# Patient Record
Sex: FEMALE | Race: WHITE | NOT HISPANIC OR LATINO | Employment: OTHER | URBAN - METROPOLITAN AREA
[De-identification: names, ages, dates, MRNs, and addresses within clinical notes are randomized per-mention and may not be internally consistent; named-entity substitution may affect disease eponyms.]

---

## 2017-08-04 ENCOUNTER — HOSPITAL ENCOUNTER (EMERGENCY)
Facility: MEDICAL CENTER | Age: 57
End: 2017-08-04
Attending: EMERGENCY MEDICINE
Payer: MEDICAID

## 2017-08-04 VITALS
OXYGEN SATURATION: 98 % | RESPIRATION RATE: 14 BRPM | BODY MASS INDEX: 34.23 KG/M2 | WEIGHT: 186 LBS | HEIGHT: 62 IN | SYSTOLIC BLOOD PRESSURE: 162 MMHG | TEMPERATURE: 97.9 F | HEART RATE: 93 BPM | DIASTOLIC BLOOD PRESSURE: 144 MMHG

## 2017-08-04 DIAGNOSIS — R60.0 EDEMA OF LEFT LOWER EXTREMITY: ICD-10-CM

## 2017-08-04 DIAGNOSIS — R73.9 HYPERGLYCEMIA: ICD-10-CM

## 2017-08-04 LAB
ALBUMIN SERPL BCP-MCNC: 3.4 G/DL (ref 3.2–4.9)
ALBUMIN/GLOB SERPL: 1.2 G/DL
ALP SERPL-CCNC: 117 U/L (ref 30–99)
ALT SERPL-CCNC: 16 U/L (ref 2–50)
ANION GAP SERPL CALC-SCNC: 9 MMOL/L (ref 0–11.9)
APTT PPP: 42 SEC (ref 24.7–36)
AST SERPL-CCNC: 14 U/L (ref 12–45)
BASOPHILS # BLD AUTO: 0.7 % (ref 0–1.8)
BASOPHILS # BLD: 0.07 K/UL (ref 0–0.12)
BILIRUB SERPL-MCNC: 0.6 MG/DL (ref 0.1–1.5)
BUN SERPL-MCNC: 16 MG/DL (ref 8–22)
CALCIUM SERPL-MCNC: 8.3 MG/DL (ref 8.5–10.5)
CHLORIDE SERPL-SCNC: 102 MMOL/L (ref 96–112)
CK SERPL-CCNC: 46 U/L (ref 0–154)
CO2 SERPL-SCNC: 22 MMOL/L (ref 20–33)
CREAT SERPL-MCNC: 1.19 MG/DL (ref 0.5–1.4)
EOSINOPHIL # BLD AUTO: 0.24 K/UL (ref 0–0.51)
EOSINOPHIL NFR BLD: 2.3 % (ref 0–6.9)
ERYTHROCYTE [DISTWIDTH] IN BLOOD BY AUTOMATED COUNT: 36.4 FL (ref 35.9–50)
GFR SERPL CREATININE-BSD FRML MDRD: 47 ML/MIN/1.73 M 2
GLOBULIN SER CALC-MCNC: 2.8 G/DL (ref 1.9–3.5)
GLUCOSE SERPL-MCNC: 351 MG/DL (ref 65–99)
HCT VFR BLD AUTO: 44.8 % (ref 37–47)
HGB BLD-MCNC: 14 G/DL (ref 12–16)
IMM GRANULOCYTES # BLD AUTO: 0.05 K/UL (ref 0–0.11)
IMM GRANULOCYTES NFR BLD AUTO: 0.5 % (ref 0–0.9)
INR PPP: 2.22 (ref 0.87–1.13)
LYMPHOCYTES # BLD AUTO: 3.5 K/UL (ref 1–4.8)
LYMPHOCYTES NFR BLD: 34 % (ref 22–41)
MCH RBC QN AUTO: 25.2 PG (ref 27–33)
MCHC RBC AUTO-ENTMCNC: 31.3 G/DL (ref 33.6–35)
MCV RBC AUTO: 80.7 FL (ref 81.4–97.8)
MONOCYTES # BLD AUTO: 0.7 K/UL (ref 0–0.85)
MONOCYTES NFR BLD AUTO: 6.8 % (ref 0–13.4)
NEUTROPHILS # BLD AUTO: 5.74 K/UL (ref 2–7.15)
NEUTROPHILS NFR BLD: 55.7 % (ref 44–72)
NRBC # BLD AUTO: 0 K/UL
NRBC BLD AUTO-RTO: 0 /100 WBC
PLATELET # BLD AUTO: 197 K/UL (ref 164–446)
PMV BLD AUTO: 10.4 FL (ref 9–12.9)
POTASSIUM SERPL-SCNC: 3.7 MMOL/L (ref 3.6–5.5)
PROT SERPL-MCNC: 6.2 G/DL (ref 6–8.2)
PROTHROMBIN TIME: 25.3 SEC (ref 12–14.6)
RBC # BLD AUTO: 5.55 M/UL (ref 4.2–5.4)
SODIUM SERPL-SCNC: 133 MMOL/L (ref 135–145)
WBC # BLD AUTO: 10.3 K/UL (ref 4.8–10.8)

## 2017-08-04 PROCEDURE — 93971 EXTREMITY STUDY: CPT

## 2017-08-04 PROCEDURE — 85610 PROTHROMBIN TIME: CPT

## 2017-08-04 PROCEDURE — 85730 THROMBOPLASTIN TIME PARTIAL: CPT

## 2017-08-04 PROCEDURE — 93971 EXTREMITY STUDY: CPT | Mod: 26 | Performed by: SURGERY

## 2017-08-04 PROCEDURE — 85025 COMPLETE CBC W/AUTO DIFF WBC: CPT

## 2017-08-04 PROCEDURE — 82550 ASSAY OF CK (CPK): CPT

## 2017-08-04 PROCEDURE — 80053 COMPREHEN METABOLIC PANEL: CPT

## 2017-08-04 PROCEDURE — 99284 EMERGENCY DEPT VISIT MOD MDM: CPT

## 2017-08-04 RX ORDER — TRAMADOL HYDROCHLORIDE 50 MG/1
50-100 TABLET ORAL EVERY 6 HOURS PRN
Qty: 20 TAB | Refills: 0 | Status: SHIPPED | OUTPATIENT
Start: 2017-08-04 | End: 2017-12-03

## 2017-08-04 NOTE — ED PROVIDER NOTES
ED Provider Note    CHIEF COMPLAINT  Chief Complaint   Patient presents with   • Leg Pain     Pt states about 1 month ago dx with DVT in her left leg. Pain in her left calf is worse and radiating up her thigh. + sensation in left foot. Foot is pink warm and dry.        HPI  Vin Maciel is a 57 y.o. female who presents for evaluation of some crampiness in the left medial as well as lateral thigh mild pain on the calf. The patient reports that she was diagnosed with a DVT at [facility] about 6 weeks ago. She was placed on Eloqius and has been compliant with the medication. She specifically denies pleuritic chest pain shortness of breath hemoptysis. No recent trauma. Her main complaint is pain on the lateral aspect of the thigh rather than the medial thigh she does have some mild symptoms. She is worried about extension of the DVT. She denies any other symptoms such as night sweats weight loss numbness tingling or weakness. She was also worried that her blood sugars running slightly high. She denies any other symptoms    REVIEW OF SYSTEMS  See HPI for further details. No numbness tingling weakness fevers or chills All other systems are negative.     PAST MEDICAL HISTORY  Past Medical History   Diagnosis Date   • DM (diabetes mellitus)    • HTN (hypertension)     DVT    FAMILY HISTORY  No history of bleeding disorder    SOCIAL HISTORY  Social History     Social History   • Marital Status:      Spouse Name: N/A   • Number of Children: N/A   • Years of Education: N/A     Social History Main Topics   • Smoking status: Current Every Day Smoker -- 1.00 packs/day   • Smokeless tobacco: Not on file   • Alcohol Use: No   • Drug Use: No   • Sexual Activity: Not on file     Other Topics Concern   • Not on file     Social History Narrative    denies IV drugs    SURGICAL HISTORY  Past Surgical History   Procedure Laterality Date   • Other       back, neck, shoulder surgeries   • Hysterectomy, total  "abdominal     • Cholecystectomy     • Appendectomy     • [facility]  delivery ser     • Other orthopedic surgery         CURRENT MEDICATIONS  No current facility-administered medications for this encounter.    Current outpatient prescriptions:   •  hydrocodone-acetaminophen (NORCO) 5-325 MG Tab per tablet, Take 1-2 Tabs by mouth every four hours as needed., Disp: 21 Tab, Rfl: 0  •  hydrocodone-acetaminophen (VICODIN ES) 7.5-750 MG per tablet, Take 1 Tab by mouth every four hours as needed (pain)., Disp: 15 Each, Rfl: 0  •  indomethacin (INDOCIN) 50 MG CAPS, Take 1 Cap by mouth 3 times a day, with meals., Disp: 30 Cap, Rfl: 0  •  omeprazole (PRILOSEC) 20 MG CPDR, Take 20 mg by mouth every day., Disp: , Rfl:   •  metoprolol (LOPRESSOR) 50 MG TABS, Take 50 mg by mouth 2 times a day., Disp: , Rfl:   •  cyclobenzaprine (FLEXERIL) 10 MG TABS, Take 10 mg by mouth 3 times a day as needed., Disp: , Rfl:   •  clonazepam (KLONOPIN) 1 MG TABS, Take 1 mg by mouth 3 times a day., Disp: , Rfl:   •  metformin SR (GLUCOPHAGE XR) 500 MG TB24, Take 2,000 mg by mouth every day., Disp: , Rfl:   •  hydrOXYzine (ATARAX) 50 MG TABS, Take 150 mg by mouth every bedtime., Disp: , Rfl:   •  spironolactone (ALDACTONE) 100 MG TABS, Take 50 mg by mouth every day., Disp: , Rfl:   •  glipiZIDE SR (GLUCOTROL) 2.5 MG TB24, Take 2.5 mg by mouth every day., Disp: , Rfl:   •  simvastatin (ZOCOR) 20 MG TABS, Take 20 mg by mouth every day., Disp: , Rfl:   Eloquis    ALLERGIES  Allergies   Allergen Reactions   • Sulfa Drugs Nausea     N/V nervousness       PHYSICAL EXAM  VITAL SIGNS: /144 mmHg  Pulse 103  Temp(Src) 36.6 °C (97.9 °F)  Resp 19  Ht 1.575 m (5' 2\")  Wt 84.369 kg (186 lb)  BMI 34.01 kg/m2  SpO2 97% Room air O2: 97    Constitutional: Well developed, Well nourished, No acute distress, Non-toxic appearance.   HENT: Normocephalic, Atraumatic, Bilateral external ears normal, Oropharynx moist, No oral exudates, Nose normal.   Eyes: " PERRLA, EOMI, Conjunctiva normal, No discharge.   Neck: Normal range of motion, No tenderness, Supple, No stridor.   Cardiovascular: Normal heart rate, Normal rhythm, No murmurs, No rubs, No gallops.   Thorax & Lungs: Normal breath sounds, No respiratory distress, No wheezing, No chest tenderness.   Abdomen: Bowel sounds normal, Soft, No tenderness, No masses, No pulsatile masses.   Skin: Warm, Dry, No erythema, No rash.   Back: No tenderness, No CVA tenderness.   Extremities: Mild tenderness in the left lateral thigh without ecchymosis no significant swelling of the left lower extremity neurovascular exam including compartments sensation and dorsalis pedal pulse in the left leg are normal   Musculoskeletal: Good range of motion in all major joints. No tenderness to palpation or major deformities noted.   Neurologic: Alert & oriented x 3, Normal motor function, Normal sensory function, No focal deficits noted.   Psychiatric: Anxious      RADIOLOGY/PROCEDURES  Results for orders placed or performed during the hospital encounter of 08/04/17   CREATINE KINASE   Result Value Ref Range    CPK Total 46 0 - 154 U/L   CBC WITH DIFFERENTIAL   Result Value Ref Range    WBC 10.3 4.8 - 10.8 K/uL    RBC 5.55 (H) 4.20 - 5.40 M/uL    Hemoglobin 14.0 12.0 - 16.0 g/dL    Hematocrit 44.8 37.0 - 47.0 %    MCV 80.7 (L) 81.4 - 97.8 fL    MCH 25.2 (L) 27.0 - 33.0 pg    MCHC 31.3 (L) 33.6 - 35.0 g/dL    RDW 36.4 35.9 - 50.0 fL    Platelet Count 197 164 - 446 K/uL    MPV 10.4 9.0 - 12.9 fL    Neutrophils-Polys 55.70 44.00 - 72.00 %    Lymphocytes 34.00 22.00 - 41.00 %    Monocytes 6.80 0.00 - 13.40 %    Eosinophils 2.30 0.00 - 6.90 %    Basophils 0.70 0.00 - 1.80 %    Immature Granulocytes 0.50 0.00 - 0.90 %    Nucleated RBC 0.00 /100 WBC    Neutrophils (Absolute) 5.74 2.00 - 7.15 K/uL    Lymphs (Absolute) 3.50 1.00 - 4.80 K/uL    Monos (Absolute) 0.70 0.00 - 0.85 K/uL    Eos (Absolute) 0.24 0.00 - 0.51 K/uL    Baso (Absolute) 0.07 0.00 -  0.12 K/uL    Immature Granulocytes (abs) 0.05 0.00 - 0.11 K/uL    NRBC (Absolute) 0.00 K/uL   COMP METABOLIC PANEL   Result Value Ref Range    Sodium 133 (L) 135 - 145 mmol/L    Potassium 3.7 3.6 - 5.5 mmol/L    Chloride 102 96 - 112 mmol/L    Co2 22 20 - 33 mmol/L    Anion Gap 9.0 0.0 - 11.9    Glucose 351 (H) 65 - 99 mg/dL    Bun 16 8 - 22 mg/dL    Creatinine 1.19 0.50 - 1.40 mg/dL    Calcium 8.3 (L) 8.5 - 10.5 mg/dL    AST(SGOT) 14 12 - 45 U/L    ALT(SGPT) 16 2 - 50 U/L    Alkaline Phosphatase 117 (H) 30 - 99 U/L    Total Bilirubin 0.6 0.1 - 1.5 mg/dL    Albumin 3.4 3.2 - 4.9 g/dL    Total Protein 6.2 6.0 - 8.2 g/dL    Globulin 2.8 1.9 - 3.5 g/dL    A-G Ratio 1.2 g/dL   PROTHROMBIN TIME   Result Value Ref Range    PT 25.3 (H) 12.0 - 14.6 sec    INR 2.22 (H) 0.87 - 1.13   APTT   Result Value Ref Range    APTT 42.0 (H) 24.7 - 36.0 sec   ESTIMATED GFR   Result Value Ref Range    GFR If  56 (A) >60 mL/min/1.73 m 2    GFR If Non  47 (A) >60 mL/min/1.73 m 2        Left lower extremity ultrasound is negative for DVT    COURSE & MEDICAL DECISION MAKING  Pertinent Labs & Imaging studies reviewed. (See chart for details)  Patient here did not have evidence of DVT. Her INR is elevated suggesting that she is indeed anticoagulated. She did not have any significant tachycardia or hypoxia or cardiopulmonary symptoms to suggest pulmonary embolism. Repeat ultrasound here does not demonstrate any worsening or additional DVT. I suspect she might have some chronic leg pain and swelling from post embolic syndrome which is quite common and lower extremity DVTs with that being said I did not feel that any change in her medication regimen is indicated other than considering increasing her glipizide to 5 mg due to hyperglycemia    FINAL IMPRESSION  1. Left lower extremity pain unclear etiology possible postphlebitic syndrome  2. Hyperglycemia without acidosis      Electronically signed by: Adrian Tavares,  8/4/2017 3:23 PM

## 2017-08-04 NOTE — ED NOTES
"Chief Complaint   Patient presents with   • Leg Pain     Pt states about 1 month ago dx with DVT in her left leg. Pain in her left calf is worse and radiating up her thigh. + sensation in left foot. Foot is pink warm and dry.      Pt denies any SOB of CP.   /144 mmHg  Pulse 103  Temp(Src) 36.6 °C (97.9 °F)  Resp 19  Ht 1.575 m (5' 2\")  Wt 84.369 kg (186 lb)  BMI 34.01 kg/m2  SpO2 97%  In gown, on monitor, chart up for ERP.   "

## 2017-08-04 NOTE — ED AVS SNAPSHOT
Home Care Instructions                                                                                                                Vin Maciel   MRN: [id number]    Department:  [facility], Emergency Dept   Date of Visit:  8/4/2017            [facility], Emergency Dept    [address]    [place] [address]    Phone:  [phone]      You were seen by     Adrian Tavares M.D.      Your Diagnosis Was     Edema of left lower extremity     R60.0       Follow-up Information     1. Follow up with Pcp Not In Computer.    Specialty:  Family Medicine    Why:  follow-up with her regular doctor on Monday or Tuesday. YOu can increase your Lantus to 90 units instead of 80 units      Medication Information     Review all of your home medications and newly ordered medications with your primary doctor and/or pharmacist as soon as possible. Follow medication instructions as directed by your doctor and/or pharmacist.     Please keep your complete medication list with you and share with your physician. Update the information when medications are discontinued, doses are changed, or new medications (including over-the-counter products) are added; and carry medication information at all times in the event of emergency situations.               Medication List      START taking these medications        Instructions    Morning Afternoon Evening Bedtime    tramadol 50 MG Tabs   Commonly known as:  ULTRAM        Take 1-2 Tabs by mouth every 6 hours as needed for Moderate Pain.   Dose:   mg                          ASK your doctor about these medications        Instructions    Morning Afternoon Evening Bedtime    cyclobenzaprine 10 MG Tabs   Commonly known as:  FLEXERIL        Take 10 mg by mouth 3 times a day as needed.   Dose:  10 mg                        glipiZIDE SR 2.5 MG Tb24   Commonly known as:  GLUCOTROL        Take 2.5 mg by mouth every day.   Dose:  2.5 mg                        * hydrocodone-acetaminophen 7.5-750 MG per tablet   Commonly known as:  VICODIN ES        Take 1 Tab by mouth every four hours as needed (pain).   Dose:  1 Tab                        * hydrocodone-acetaminophen 5-325 MG Tabs per tablet   Commonly known as:  NORCO        Take 1-2 Tabs by mouth every four hours as needed.   Dose:  1-2 Tab                        hydrOXYzine 50 MG Tabs   Commonly known as:  ATARAX        Take 150 mg by mouth every bedtime.   Dose:  150 mg                        indomethacin 50 MG Caps   Commonly known as:  INDOCIN        Take 1 Cap by mouth 3 times a day, with meals.   Dose:  50 mg                        KLONOPIN 1 MG Tabs   Generic drug:  clonazepam        Take 1 mg by mouth 3 times a day.   Dose:  1 mg                        metformin  MG Tb24   Commonly known as:  GLUCOPHAGE XR        Take 2,000 mg by mouth every day.   Dose:  2000 mg                        metoprolol 50 MG Tabs   Commonly known as:  LOPRESSOR        Take 50 mg by mouth 2 times a day.   Dose:  50 mg                        omeprazole 20 MG delayed-release capsule   Commonly known as:  PRILOSEC        Take 20 mg by mouth every day.   Dose:  20 mg                        simvastatin 20 MG Tabs   Commonly known as:  ZOCOR        Take 20 mg by mouth every day.   Dose:  20 mg                        spironolactone 100 MG Tabs   Commonly known as:  ALDACTONE        Take 50 mg by mouth every day.   Dose:  50 mg                        * Notice:  This list has 2 medication(s) that are the same as other medications prescribed for you. Read the directions carefully, and ask your doctor or other care provider to review them with you.         Where to Get Your Medications      You can get these medications from any pharmacy     Bring a paper prescription for each of these medications    - tramadol 50 MG Tabs            Procedures and tests performed during your visit     APTT    CBC WITH DIFFERENTIAL    COMP METABOLIC PANEL     CREATINE KINASE    ESTIMATED GFR    LE VENOUS DUPLEX (Specify in Comments Left, Right Or Bilateral)    PROTHROMBIN TIME        Discharge Instructions       Diabetes, Type 2, Am I At Risk?  Diabetes is a lasting (chronic) disease. In type 2 diabetes, the pancreas does not make enough insulin, and the body does not respond normally to the insulin that is made. This type of diabetes was also previously called adult onset diabetes. About 90% of all those who have diabetes have type 2. It usually occurs after the age of 40, but can occur at any age.   People develop type 2 diabetes because they do not use insulin properly. Eventually, the pancreas cannot make enough insulin for the body's needs. Over time, the amount of glucose (sugar) in the blood increases.  RISK FACTORS  · Overweight  the more weight you have, the more resistant your cells become to insulin.  · Family history  you are more likely to get diabetes if a parent or sibling has diabetes.  · Race certain races get diabetes more.  ·  Americans.  · American Indians.  ·  Americans.  · Hispanics.  · .  · Inactive exercise helps control weight and helps your cells be more sensitive to insulin.  · Gestational diabetes  some women develop diabetes while they are pregnant. This goes away when they deliver. However, they are 50-60% more likely to develop type 2 diabetes at a later time.  · Having a baby over 9 pounds  a sign that you may have had gestational diabetes.  · Age the risk of diabetes goes up as you get older, especially after age 45.  · High blood pressure (hypertension).  SYMPTOMS  Many people have no signs or symptoms. Symptoms can be so mild that you might not even notice them. Some of these signs are:  · Increased thirst.  · Increased hunger.  · Tiredness (fatigue).  · Increased urination, especially at night.  · Weight loss.  · Blurred vision.  · Sores that do not heal.  WHO SHOULD BE TESTED?  · Anyone 45 years or older,  especially if overweight, should consider getting tested.  · If you are younger than 45, overweight, and have one or more of the risk factors, you should consider getting tested.  DIAGNOSIS  · Fasting blood glucose (FBS). Usually, 2 are done.  · -125 mg/dl is considered pre-diabetes.  ·  mg/dl or greater is considered diabetes.  · 2 hour Oral Glucose Tolerance Test (OGTT). This test is preformed by first having you not eat or drink for several hours. You are then given something sweet to drink and your blood glucose is measured fasting, at one hour and 2 hours. This test tells how well you are able to handle sugars or carbohydrates.  · Fastin-100 mg/dl.  · 1 hour: less than 200 mg/dl.  · 2 hours: less than 140 mg/dl.  · A1c A1c is a blood glucose test that gives and average of your blood glucose over 3 months. It is the accepted method to use to diagnose diabetes.  · A1c 5.7-6.4% is considered pre-diabetes.  · A1c 6.5% or greater is considered diabetes.  WHAT DOES IT MEAN TO HAVE PRE-DIABETES?  Pre-diabetes means you are at risk for getting type 2 diabetes. Your blood glucose is higher than normal, but not yet high enough to diagnose diabetes. The good news is, if you have pre-diabetes you can reduce the risk of getting diabetes and even return to normal blood glucose levels. With modest weight loss and moderate physical activity, you can delay or prevent type 2 diabetes.   PREVENTION  You cannot do anything about race, age or family history, but you can lower your chances of getting diabetes. You can:   · Exercise regularly and be active.  · Reduce fat and calorie intake.  · Make wise food choices as much as you can.  · Reduce your intake of salt and alcohol.  · Maintain a reasonable weight.  · Keep blood pressure in an acceptable range. Take medication if needed.  · Not smoke.  · Maintain an acceptable cholesterol level (HDL, LDL, Triglycerides). Take medication if needed.  DOING MY PART:  GETTING STARTED  Making big changes in your life is hard, especially if you are faced with more than one change. You can make it easier by taking these steps:  · Make a plan to change behavior.  · Decide exactly what you will do and when you will do it.  · Plan what you need to get ready.  · Think about what might prevent you from reaching your goals.  · Find family and friends who will support and encourage you.  · Decide how you will reward yourself when you do what you have planned.  · Your doctor, dietitian, or counselor can help you make a plan.  HERE ARE SOME OF THE AREAS YOU MAY WISH TO CHANGE TO REDUCE YOUR RISK OF DIABETES.  If you are overweight or obese, choose sensible ways to get in shape. Even small amounts of weight loss, like 5-10 pounds, can help reduce the effects of insulin resistance and help blood glucose control.  Diet  · Avoid crash diets. Instead, eat less of the foods you usually have. Limit the amount of fat you eat.  · Increase your physical activity. Aim for at least 30 minutes of exercise most days of the week.  · Set a reasonable weight-loss goal, such as losing 1 pound a week. Aim for a long-term goal of losing 5-7% of your total body weight.  · Make wise food choices most of the time.  · What you eat has a big impact on your health. By making mathew food choices, you can help control your body weight, blood pressure, and cholesterol.  · Take a hard look at the serving sizes of the foods you eat. Reduce serving sizes of meat, desserts, and foods high in fat. Increase your intake of fruits and vegetables.  · Limit your fat intake to about 25% of your total calories. For example, if your food choices add up to about 2,000 calories a day, try to eat no more than 56 grams of fat. Your caregiver or a dietitian can help you figure out how much fat to have. You can check food labels for fat content too.  · You may also want to reduce the number of calories you have each day.  · Keep a food log.  Write down what you eat, how much you eat, and anything else that helps keep you on track.  · When you meet your goal, reward yourself with a nonfood item or activity.  Exercise  · Be physically active every day.  · Keep and exercise log. Write down what exercise you did, for how long, and anything else that keeps you on track.  · Regular exercise (like brisk walking) tackles several risk factors at once. It helps you lose weight, it keeps your cholesterol and blood pressure under control, and it helps your body use insulin. People who are physically active for 30 minutes a day, 5 days a week, reduced their risk of type 2 diabetes. If you are not very active, you should start slowly at first. Talk with your caregiver first about what kinds of exercise would be safe for you. Make a plan to increase your activity level with the goal of being active for at least 30 minutes a day, most days of the week.  · Choose activities you enjoy. Here are some ways to work extra activity into your daily routine:  · Take the stairs rather than an elevator or escalator.  · Park at the far end of the lot and walk.  · Get off the bus a few stops early and walk the rest of the way.  · Walk or bicycle instead of drive whenever you can.  Medications  Some people need medication to help control their blood pressure or cholesterol levels. If you do, take your medicines as directed. Ask your caregiver whether there are any medicines you can take to prevent type 2 diabetes.  Document Released: 12/20/2004 Document Revised: 03/11/2013 Document Reviewed: 09/15/2010  ExitCare® Patient Information ©2014 [employer].    Edema  Edema is an abnormal buildup of fluids in your body tissues. Edema is somewhat dependent on gravity to pull the fluid to the lowest place in your body. That makes the condition more common in the legs and thighs (lower extremities). Painless swelling of the feet and ankles is common and becomes more likely as you get older.  It is also common in looser tissues, like around your eyes.   When the affected area is squeezed, the fluid may move out of that spot and leave a dent for a few moments. This dent is called pitting.   CAUSES   There are many possible causes of edema. Eating too much salt and being on your feet or sitting for a long time can cause edema in your legs and ankles. Hot weather may make edema worse. Common medical causes of edema include:  · Heart failure.  · Liver disease.  · Kidney disease.  · Weak blood vessels in your legs.  · Cancer.  · An injury.  · Pregnancy.  · Some medications.  · Obesity.   SYMPTOMS   Edema is usually painless. Your skin may look swollen or shiny.   DIAGNOSIS   Your health care provider may be able to diagnose edema by asking about your medical history and doing a physical exam. You may need to have tests such as X-rays, an electrocardiogram, or blood tests to check for medical conditions that may cause edema.   TREATMENT   Edema treatment depends on the cause. If you have heart, liver, or kidney disease, you need the treatment appropriate for these conditions. General treatment may include:  · Elevation of the affected body part above the level of your heart.  · Compression of the affected body part. Pressure from elastic bandages or support stockings squeezes the tissues and forces fluid back into the blood vessels. This keeps fluid from entering the tissues.  · Restriction of fluid and salt intake.  · Use of a water pill (diuretic). These medications are appropriate only for some types of edema. They pull fluid out of your body and make you urinate more often. This gets rid of fluid and reduces swelling, but diuretics can have side effects. Only use diuretics as directed by your health care provider.  HOME CARE INSTRUCTIONS   · Keep the affected body part above the level of your heart when you are lying down.    · Do not sit still or stand for prolonged periods.    · Do not put anything  directly under your knees when lying down.  · Do not wear constricting clothing or garters on your upper legs.    · Exercise your legs to work the fluid back into your blood vessels. This may help the swelling go down.    · Wear elastic bandages or support stockings to reduce ankle swelling as directed by your health care provider.    · Eat a low-salt diet to reduce fluid if your health care provider recommends it.    · Only take medicines as directed by your health care provider.   SEEK MEDICAL CARE IF:   · Your edema is not responding to treatment.  · You have heart, liver, or kidney disease and notice symptoms of edema.  · You have edema in your legs that does not improve after elevating them.    · You have sudden and unexplained weight gain.  SEEK IMMEDIATE MEDICAL CARE IF:   · You develop shortness of breath or chest pain.    · You cannot breathe when you lie down.  · You develop pain, redness, or warmth in the swollen areas.    · You have heart, liver, or kidney disease and suddenly get edema.  · You have a fever and your symptoms suddenly get worse.  MAKE SURE YOU:   · Understand these instructions.  · Will watch your condition.  · Will get help right away if you are not doing well or get worse.     This information is not intended to replace advice given to you by your health care provider. Make sure you discuss any questions you have with your health care provider.     Document Released: 12/18/2006 Document Revised: 01/08/2016 Document Reviewed: 10/10/2014  Elsevier Interactive Patient Education ©2016 [employer]            Patient Information     Patient Information    Following emergency treatment: all patient requiring follow-up care must return either to a private physician or a clinic if your condition worsens before you are able to obtain further medical attention, please return to the emergency room.     Billing Information    At [facility], we work to make the billing process streamlined for  our patients.  Our Representatives are here to answer any questions you may have regarding your hospital bill.  If you have insurance coverage and have supplied your insurance information to us, we will submit a claim to your insurer on your behalf.  Should you have any questions regarding your bill, we can be reached online or by phone as follows:  Online: You are able pay your bills online or live chat with our representatives about any billing questions you may have. We are here to help Monday - Friday from 8:00am to 7:30pm and 9:00am - 12:00pm on Saturdays.  Please visit [email or web address]  for more information.   Phone:  [phone] or [phone]    Please note that your emergency physician, surgeon, pathologist, radiologist, anesthesiologist, and other specialists are not employed by [facility] and will therefore bill separately for their services.  Please contact them directly for any questions concerning their bills at the numbers below:     Emergency Physician Services:  [phone]  [facility]:  [phone]  [facility]:  [phone]  [facility]:  [phone]    1. Your final bill may vary from the amount quoted upon discharge if all procedures are not complete at that time, or if your doctor has additional procedures of which we are not aware. You will receive an additional bill if you return to the Emergency Department at [facility] for suture removal regardless of the facility of which the sutures were placed.     2. Please arrange for settlement of this account at the emergency registration.    3. All self-pay accounts are due in full at the time of treatment.  If you are unable to meet this obligation then payment is expected within 4-5 days.     4. If you have had radiology studies (CT, X-ray, Ultrasound, MRI), you have received a preliminary result during your emergency department visit. Please contact the  radiology department [phone] to receive a copy of your final result. Please discuss the Final result with your primary physician or with the follow up physician provided.     Crisis Hotline:  [place] Crisis Hotline:  2-752-OFIZDOC or 1-390.647.5829  [place] Crisis Hotline:    1-790.428.4771 or 358-852-9177         ED Discharge Follow Up Questions    1. In order to provide you with very good care, we would like to follow up with a phone call in the next few days.  May we have your permission to contact you?     YES /  NO    2. What is the best phone number to call you? (       )_____-__________    3. What is the best time to call you?      Morning  /  Afternoon  /  Evening                   Patient Signature:  ____________________________________________________________    Date:  ____________________________________________________________      Your appointments     Aug 10, 2017  2:45 PM   New Patient with Agustín Randolph M.D.   [facility] - Internal Medicine (--)    [address] [place] [id number]   [phone]           Please bring Photo ID, Insurance Cards, All Medication Bottles and copies of any legal documents (such as Living Will, Power of ) If speaking a language besides English please bring an adult . Please arrive 30 minutes prior for check in and registration. You will be receiving a confirmation call a few days before your appointment from our automated call confirmation system.

## 2017-08-04 NOTE — ED AVS SNAPSHOT
8/4/2017    Vin Maciel  [address] [facility] [address]    Dear Vin:    [facility] wants to ensure your discharge home is safe and you or your loved ones have had all of your questions answered regarding your care after you leave the hospital.    Below is a list of resources and contact information should you have any questions regarding your hospital stay, follow-up instructions, or active medical symptoms.    Questions or Concerns Regarding… Contact   Medical Questions Related to Your Discharge  (7 days a week, 8am-5pm) Contact a Nurse Care Coordinator   [phone]   Medical Questions Not Related to Your Discharge  (24 hours a day / 7 days a week)  Contact the Nurse Health Line   [phone]    Medications or Discharge Instructions Refer to your discharge packet   or contact your [facility] Primary Care Provider   [phone]   Follow-up Appointment(s) Schedule your appointment via [employer]   or contact Scheduling [phone]   Billing Review your statement via [employer]  or contact Billing [phone]   Medical Records Review your records via [employer]   or contact Medical Records [phone]     You may receive a telephone call within two days of discharge. This call is to make certain you understand your discharge instructions and have the opportunity to have any questions answered. You can also easily access your medical information, test results and upcoming appointments via the [employer] free online health management tool. You can learn more and sign up at [employer]. For assistance setting up your [employer] account, please call [phone].    Once again, we want to ensure your discharge home is safe and that you have a clear understanding of any next steps in your care. If you have any questions or concerns, please do not hesitate to contact us, we are here for you. Thank you for choosing [facility] for your healthcare needs.    Sincerely,    Your [facility] Healthcare Team

## 2017-08-04 NOTE — DISCHARGE INSTRUCTIONS
Diabetes, Type 2, Am I At Risk?  Diabetes is a lasting (chronic) disease. In type 2 diabetes, the pancreas does not make enough insulin, and the body does not respond normally to the insulin that is made. This type of diabetes was also previously called adult onset diabetes. About 90% of all those who have diabetes have type 2. It usually occurs after the age of 40, but can occur at any age.   People develop type 2 diabetes because they do not use insulin properly. Eventually, the pancreas cannot make enough insulin for the body's needs. Over time, the amount of glucose (sugar) in the blood increases.  RISK FACTORS  · Overweight  the more weight you have, the more resistant your cells become to insulin.  · Family history  you are more likely to get diabetes if a parent or sibling has diabetes.  · Race certain races get diabetes more.  ·  Americans.  · American Indians.  ·  Americans.  · Hispanics.  · .  · Inactive exercise helps control weight and helps your cells be more sensitive to insulin.  · Gestational diabetes  some women develop diabetes while they are pregnant. This goes away when they deliver. However, they are 50-60% more likely to develop type 2 diabetes at a later time.  · Having a baby over 9 pounds  a sign that you may have had gestational diabetes.  · Age the risk of diabetes goes up as you get older, especially after age 45.  · High blood pressure (hypertension).  SYMPTOMS  Many people have no signs or symptoms. Symptoms can be so mild that you might not even notice them. Some of these signs are:  · Increased thirst.  · Increased hunger.  · Tiredness (fatigue).  · Increased urination, especially at night.  · Weight loss.  · Blurred vision.  · Sores that do not heal.  WHO SHOULD BE TESTED?  · Anyone 45 years or older, especially if overweight, should consider getting tested.  · If you are younger than 45, overweight, and have one or more of the risk factors, you should  consider getting tested.  DIAGNOSIS  · Fasting blood glucose (FBS). Usually, 2 are done.  · -125 mg/dl is considered pre-diabetes.  ·  mg/dl or greater is considered diabetes.  · 2 hour Oral Glucose Tolerance Test (OGTT). This test is preformed by first having you not eat or drink for several hours. You are then given something sweet to drink and your blood glucose is measured fasting, at one hour and 2 hours. This test tells how well you are able to handle sugars or carbohydrates.  · Fastin-100 mg/dl.  · 1 hour: less than 200 mg/dl.  · 2 hours: less than 140 mg/dl.  · A1c A1c is a blood glucose test that gives and average of your blood glucose over 3 months. It is the accepted method to use to diagnose diabetes.  · A1c 5.7-6.4% is considered pre-diabetes.  · A1c 6.5% or greater is considered diabetes.  WHAT DOES IT MEAN TO HAVE PRE-DIABETES?  Pre-diabetes means you are at risk for getting type 2 diabetes. Your blood glucose is higher than normal, but not yet high enough to diagnose diabetes. The good news is, if you have pre-diabetes you can reduce the risk of getting diabetes and even return to normal blood glucose levels. With modest weight loss and moderate physical activity, you can delay or prevent type 2 diabetes.   PREVENTION  You cannot do anything about race, age or family history, but you can lower your chances of getting diabetes. You can:   · Exercise regularly and be active.  · Reduce fat and calorie intake.  · Make wise food choices as much as you can.  · Reduce your intake of salt and alcohol.  · Maintain a reasonable weight.  · Keep blood pressure in an acceptable range. Take medication if needed.  · Not smoke.  · Maintain an acceptable cholesterol level (HDL, LDL, Triglycerides). Take medication if needed.  DOING MY PART: GETTING STARTED  Making big changes in your life is hard, especially if you are faced with more than one change. You can make it easier by taking these  steps:  · Make a plan to change behavior.  · Decide exactly what you will do and when you will do it.  · Plan what you need to get ready.  · Think about what might prevent you from reaching your goals.  · Find family and friends who will support and encourage you.  · Decide how you will reward yourself when you do what you have planned.  · Your doctor, dietitian, or counselor can help you make a plan.  HERE ARE SOME OF THE AREAS YOU MAY WISH TO CHANGE TO REDUCE YOUR RISK OF DIABETES.  If you are overweight or obese, choose sensible ways to get in shape. Even small amounts of weight loss, like 5-10 pounds, can help reduce the effects of insulin resistance and help blood glucose control.  Diet  · Avoid crash diets. Instead, eat less of the foods you usually have. Limit the amount of fat you eat.  · Increase your physical activity. Aim for at least 30 minutes of exercise most days of the week.  · Set a reasonable weight-loss goal, such as losing 1 pound a week. Aim for a long-term goal of losing 5-7% of your total body weight.  · Make wise food choices most of the time.  · What you eat has a big impact on your health. By making mathew food choices, you can help control your body weight, blood pressure, and cholesterol.  · Take a hard look at the serving sizes of the foods you eat. Reduce serving sizes of meat, desserts, and foods high in fat. Increase your intake of fruits and vegetables.  · Limit your fat intake to about 25% of your total calories. For example, if your food choices add up to about 2,000 calories a day, try to eat no more than 56 grams of fat. Your caregiver or a dietitian can help you figure out how much fat to have. You can check food labels for fat content too.  · You may also want to reduce the number of calories you have each day.  · Keep a food log. Write down what you eat, how much you eat, and anything else that helps keep you on track.  · When you meet your goal, reward yourself with a nonfood  item or activity.  Exercise  · Be physically active every day.  · Keep and exercise log. Write down what exercise you did, for how long, and anything else that keeps you on track.  · Regular exercise (like brisk walking) tackles several risk factors at once. It helps you lose weight, it keeps your cholesterol and blood pressure under control, and it helps your body use insulin. People who are physically active for 30 minutes a day, 5 days a week, reduced their risk of type 2 diabetes. If you are not very active, you should start slowly at first. Talk with your caregiver first about what kinds of exercise would be safe for you. Make a plan to increase your activity level with the goal of being active for at least 30 minutes a day, most days of the week.  · Choose activities you enjoy. Here are some ways to work extra activity into your daily routine:  · Take the stairs rather than an elevator or escalator.  · Park at the far end of the lot and walk.  · Get off the bus a few stops early and walk the rest of the way.  · Walk or bicycle instead of drive whenever you can.  Medications  Some people need medication to help control their blood pressure or cholesterol levels. If you do, take your medicines as directed. Ask your caregiver whether there are any medicines you can take to prevent type 2 diabetes.  Document Released: 12/20/2004 Document Revised: 03/11/2013 Document Reviewed: 09/15/2010  ExitCare® Patient Information ©2014 [employer], [address].    Edema  Edema is an abnormal buildup of fluids in your body tissues. Edema is somewhat dependent on gravity to pull the fluid to the lowest place in your body. That makes the condition more common in the legs and thighs (lower extremities). Painless swelling of the feet and ankles is common and becomes more likely as you get older. It is also common in looser tissues, like around your eyes.   When the affected area is squeezed, the fluid may move out of that spot and leave a  dent for a few moments. This dent is called pitting.   CAUSES   There are many possible causes of edema. Eating too much salt and being on your feet or sitting for a long time can cause edema in your legs and ankles. Hot weather may make edema worse. Common medical causes of edema include:  · Heart failure.  · Liver disease.  · Kidney disease.  · Weak blood vessels in your legs.  · Cancer.  · An injury.  · Pregnancy.  · Some medications.  · Obesity.   SYMPTOMS   Edema is usually painless. Your skin may look swollen or shiny.   DIAGNOSIS   Your health care provider may be able to diagnose edema by asking about your medical history and doing a physical exam. You may need to have tests such as X-rays, an electrocardiogram, or blood tests to check for medical conditions that may cause edema.   TREATMENT   Edema treatment depends on the cause. If you have heart, liver, or kidney disease, you need the treatment appropriate for these conditions. General treatment may include:  · Elevation of the affected body part above the level of your heart.  · Compression of the affected body part. Pressure from elastic bandages or support stockings squeezes the tissues and forces fluid back into the blood vessels. This keeps fluid from entering the tissues.  · Restriction of fluid and salt intake.  · Use of a water pill (diuretic). These medications are appropriate only for some types of edema. They pull fluid out of your body and make you urinate more often. This gets rid of fluid and reduces swelling, but diuretics can have side effects. Only use diuretics as directed by your health care provider.  HOME CARE INSTRUCTIONS   · Keep the affected body part above the level of your heart when you are lying down.    · Do not sit still or stand for prolonged periods.    · Do not put anything directly under your knees when lying down.  · Do not wear constricting clothing or garters on your upper legs.    · Exercise your legs to work the  fluid back into your blood vessels. This may help the swelling go down.    · Wear elastic bandages or support stockings to reduce ankle swelling as directed by your health care provider.    · Eat a low-salt diet to reduce fluid if your health care provider recommends it.    · Only take medicines as directed by your health care provider.   SEEK MEDICAL CARE IF:   · Your edema is not responding to treatment.  · You have heart, liver, or kidney disease and notice symptoms of edema.  · You have edema in your legs that does not improve after elevating them.    · You have sudden and unexplained weight gain.  SEEK IMMEDIATE MEDICAL CARE IF:   · You develop shortness of breath or chest pain.    · You cannot breathe when you lie down.  · You develop pain, redness, or warmth in the swollen areas.    · You have heart, liver, or kidney disease and suddenly get edema.  · You have a fever and your symptoms suddenly get worse.  MAKE SURE YOU:   · Understand these instructions.  · Will watch your condition.  · Will get help right away if you are not doing well or get worse.     This information is not intended to replace advice given to you by your health care provider. Make sure you discuss any questions you have with your health care provider.     Document Released: 12/18/2006 Document Revised: 01/08/2016 Document Reviewed: 10/10/2014  [employer] Interactive Patient Education ©2016 [employer]

## 2017-08-04 NOTE — ED AVS SNAPSHOT
[employer] ([employer]) Access Code: [email or web address]  Expires: 8/30/2017  4:56 PM    Ext[employer] Reach (formerly [employer])  A secure, online tool to manage your health information     [employer]s Ext[employer] Reach (formerly Brand[employer])® is a secure, online tool that connects you to your personalized health information from the privacy of your home -- day or night - making it very easy for you to manage your healthcare. Once the activation process is completed, you can even access your medical information using the Extreme Reach (formerly [employer]) babita, which is available for free in the Apple Babita store or Google Play store.     Ext[employer]ach (formerly [employer]) provides the following levels of access (as shown below):   My Chart Features   [facility] Primary Care Doctor [facility]  Specialists [facility]  Urgent  Care Non-[facility]  Primary Care  Doctor   Email your healthcare team securely and privately 24/7 X X X X   Manage appointments: schedule your next appointment; view details of past/upcoming appointments X      Request prescription refills. X      View recent personal medical records, including lab and immunizations X X X X   View health record, including health history, allergies, medications X X X X   Read reports about your outpatient visits, procedures, consult and ER notes X X X X   See your discharge summary, which is a recap of your hospital and/or ER visit that includes your diagnosis, lab results, and care plan. X X       How to register for Ext[employer]ach (formerly [employer]):  1. Go to  [email or web address].  2. Click on the Sign Up Now box, which takes you to the New Member Sign Up page. You will need to provide the following information:  a. Enter your Ext[employer] Reach (formerly [employer]) Access Code exactly as it appears at the top of this page. (You will not need to use this code after you’ve completed the sign-up process. If you do not sign up before the expiration date, you must request a new code.)   b. Enter your date of birth.   c. Enter your home email address.   d. Click Submit, and follow the next screen’s instructions.  3. Create a Ext[employer] Reach (formerly [employer]) ID. This will be your Ext[employer]ach (formerly [employer])  login ID and cannot be changed, so think of one that is secure and easy to remember.  4. Create a [employer] password. You can change your password at any time.  5. Enter your Password Reset Question and Answer. This can be used at a later time if you forget your password.   6. Enter your e-mail address. This allows you to receive e-mail notifications when new information is available in [employer].  7. Click Sign Up. You can now view your health information.    For assistance activating your [employer] account, call [phone]

## 2017-08-05 NOTE — ED NOTES
Verbalizes understanding of discharge and followup instructions. PIV removed, VSS. Given Rx. Ambulates with steady gait to discharge where she plans to take taxi home.

## 2017-10-08 ENCOUNTER — APPOINTMENT (OUTPATIENT)
Dept: RADIOLOGY | Facility: MEDICAL CENTER | Age: 57
End: 2017-10-08
Attending: EMERGENCY MEDICINE
Payer: MEDICARE

## 2017-10-08 ENCOUNTER — HOSPITAL ENCOUNTER (EMERGENCY)
Facility: MEDICAL CENTER | Age: 57
End: 2017-10-08
Attending: EMERGENCY MEDICINE
Payer: MEDICARE

## 2017-10-08 VITALS
OXYGEN SATURATION: 98 % | HEIGHT: 62 IN | SYSTOLIC BLOOD PRESSURE: 117 MMHG | TEMPERATURE: 98 F | DIASTOLIC BLOOD PRESSURE: 80 MMHG | RESPIRATION RATE: 18 BRPM | BODY MASS INDEX: 33.02 KG/M2 | WEIGHT: 179.45 LBS | HEART RATE: 111 BPM

## 2017-10-08 DIAGNOSIS — L03.119 CELLULITIS OF FOOT: ICD-10-CM

## 2017-10-08 DIAGNOSIS — R73.9 HYPERGLYCEMIA: ICD-10-CM

## 2017-10-08 LAB — GLUCOSE BLD-MCNC: 351 MG/DL (ref 65–99)

## 2017-10-08 PROCEDURE — 73630 X-RAY EXAM OF FOOT: CPT | Mod: RT

## 2017-10-08 PROCEDURE — 700102 HCHG RX REV CODE 250 W/ 637 OVERRIDE(OP): Performed by: EMERGENCY MEDICINE

## 2017-10-08 PROCEDURE — 82962 GLUCOSE BLOOD TEST: CPT

## 2017-10-08 PROCEDURE — 99284 EMERGENCY DEPT VISIT MOD MDM: CPT

## 2017-10-08 PROCEDURE — A9270 NON-COVERED ITEM OR SERVICE: HCPCS | Performed by: EMERGENCY MEDICINE

## 2017-10-08 RX ORDER — CEPHALEXIN 500 MG/1
1000 CAPSULE ORAL 3 TIMES DAILY
Qty: 42 CAP | Refills: 0 | Status: SHIPPED | OUTPATIENT
Start: 2017-10-08 | End: 2017-10-11

## 2017-10-08 RX ORDER — ACETAMINOPHEN 325 MG/1
1000 TABLET ORAL ONCE
Status: COMPLETED | OUTPATIENT
Start: 2017-10-08 | End: 2017-10-08

## 2017-10-08 RX ADMIN — ACETAMINOPHEN 975 MG: 325 TABLET, FILM COATED ORAL at 20:06

## 2017-10-08 ASSESSMENT — LIFESTYLE VARIABLES: DO YOU DRINK ALCOHOL: NO

## 2017-10-08 ASSESSMENT — PAIN SCALES - GENERAL: PAINLEVEL_OUTOF10: 8

## 2017-10-08 NOTE — ED NOTES
Pt to triage c/o left heel wound x 4 days. Area red and tender. Pt advised to return to triage nurse for any changes or concerns.

## 2017-10-09 NOTE — ED PROVIDER NOTES
ED Provider Note    Scribed for Tory Gibson M.D. by Nasrin Edmondson. 10/8/2017  7:49 PM    Means of arrival: Walk-in  History obtained from: Patient  History limited by: None    CHIEF COMPLAINT  Heel pain    HPI  Vin Maciel is a 57 y.o. female with a history of diabetes, hypertension and DVT who presents to the Emergency Department for right heel pain.  States that is warm to touch onset five days that she reports comes on every five minutes. She states it feels like a knife is being shoved into her heel, and she is unable to sleep at night due to the pain. She denies any recent trauma or falls prior to the pain onset. She has been taking Ibuprofen with no relief. This is accompanied with fatigue and malaise which has been worsening since her heel pain started. She also states she has fever and chills but did not take her temperature because she never runs fevers. She has been nauseated today but denies vomiting or diarrhea. She is able to ambulate but with a limp. She is an insulin-dependent diabetic, and she has normally dry feet and dry hands and gets cracks in her skin. She states her sugars have been running high recently. The patient is currently on Eliquis due to history of DVT.     REVIEW OF SYSTEMS  Pertinent positive include heel pain, fatigue, malaise, subjective fever, chills, nausea. Pertinent negative include vomiting, diarrhea. All other systems reviewed and are negative.    PAST MEDICAL HISTORY   has a past medical history of DM (diabetes mellitus) and HTN (hypertension).    SOCIAL HISTORY  Social History     Social History Main Topics   • Smoking status: Current Every Day Smoker     Packs/day: 1.00   • Alcohol use No   • Drug use: No     SURGICAL HISTORY   has a past surgical history that includes other; hysterectomy, total abdominal; cholecystectomy; appendectomy;  DELIVERY SER; and other orthopedic surgery.    CURRENT MEDICATIONS  Current Outpatient Prescriptions on File Prior  "to Encounter   Medication Sig Dispense Refill   • tramadol (ULTRAM) 50 MG Tab Take 1-2 Tabs by mouth every 6 hours as needed for Moderate Pain. 20 Tab 0   • hydrocodone-acetaminophen (NORCO) 5-325 MG Tab per tablet Take 1-2 Tabs by mouth every four hours as needed. 21 Tab 0   • hydrocodone-acetaminophen (VICODIN ES) 7.5-750 MG per tablet Take 1 Tab by mouth every four hours as needed (pain). 15 Each 0   • indomethacin (INDOCIN) 50 MG CAPS Take 1 Cap by mouth 3 times a day, with meals. 30 Cap 0   • omeprazole (PRILOSEC) 20 MG CPDR Take 20 mg by mouth every day.     • metoprolol (LOPRESSOR) 50 MG TABS Take 50 mg by mouth 2 times a day.     • cyclobenzaprine (FLEXERIL) 10 MG TABS Take 10 mg by mouth 3 times a day as needed.     • clonazepam (KLONOPIN) 1 MG TABS Take 1 mg by mouth 3 times a day.     • metformin SR (GLUCOPHAGE XR) 500 MG TB24 Take 2,000 mg by mouth every day.     • hydrOXYzine (ATARAX) 50 MG TABS Take 150 mg by mouth every bedtime.     • spironolactone (ALDACTONE) 100 MG TABS Take 50 mg by mouth every day.     • glipiZIDE SR (GLUCOTROL) 2.5 MG TB24 Take 2.5 mg by mouth every day.     • simvastatin (ZOCOR) 20 MG TABS Take 20 mg by mouth every day.       ALLERGIES  Allergies   Allergen Reactions   • Sulfa Drugs Nausea     N/V nervousness     PHYSICAL EXAM   VITAL SIGNS: /72   Pulse (!) 114   Temp 36.7 °C (98 °F) (Temporal)   Resp 18   Ht 1.575 m (5' 2\")   Wt 81.4 kg (179 lb 7.3 oz)   SpO2 94%   BMI 32.82 kg/m²    Constitutional:  female sitting on the edge of bed, Alert in no apparent distress.  HENT: Normocephalic, Atraumatic. Bilateral external ears normal. Nose normal.  Moist mucous membranes.  Oropharynx clear.  Eyes: Pupils are equal and reactive. Conjunctiva normal.   Neck: Supple, full range of motion  Heart: Regular rate and rhythm.  No murmurs.    Lungs: No respiratory distress, normal work of breathing. Lungs clear to auscultation bilaterally.  Abdomen Soft, no distention.  " "No tenderness to palpation.  Musculoskeletal: Right heel is mildly erythematous with a few dry skin cracks, diffusely tender to palpation. Mildly warm to touch. 2+ DP/PT pulses. No lower extremity edema.  Skin: Warm, Dry.  No erythema, No rash.   Neurologic: Alert and oriented x3. Moving all extremities spontaneously without focal deficits.  Psychiatric: Odd affect, Mood normal, Appears appropriate and not intoxicated.    ED COURSE & MEDICAL DECISION MAKING  Patient Vitals for the past 24 hrs:   BP Temp Temp src Pulse Resp SpO2 Height Weight   10/08/17 1643 - - - - - - - 81.4 kg (179 lb 7.3 oz)   10/08/17 1637 110/72 36.7 °C (98 °F) Temporal (!) 114 18 94 % 1.575 m (5' 2\") -     Medications administered:  Medications   acetaminophen (TYLENOL) tablet 975 mg (975 mg Oral Given 10/8/17 2006)     Labs and imaging personally reviewed:    LABS  Labs Reviewed   ACCU-CHEK GLUCOSE - Abnormal; Notable for the following:        Result Value    Glucose - Accu-Ck 351 (*)     All other components within normal limits     RADIOLOGY  DX-FOOT-COMPLETE 3+ RIGHT   Final Result      No fracture and no radiographic evidence for osteomyelitis        MDM:    7:49 PM Patient seen and examined at bedside. The patient is a diabetic that presents with atraumatic right heel pain overlying dry skin heel cracks. Ordered for x-ray of right foot and blood glucose lab to evaluate. Patient will be treated with Tylenol  mg for her symptoms.     Patient with history of insulin-dependent diabetes and DVT on Elaquis who presents with 5 day history of atraumatic right heel pain.  Afebrile on arrival with mild tachycardia, otherwise normal vitals. Patient is well appearing and not systemically ill. Exam with tenderness overlying skin cracks in her heel with mild warmth. No evidence of open wounds or fluctuance concerning for abscess.  Doubt necrotizing fasciitis. X-ray negative for fracture or concern for osteomyelitis.  Patient hyperglycemic, " however doubt DKA or electrolyte abnormalities based on presentation.  Will plan to treat for possible developing cellulitis, however I suspect that the pain is more associated with her cracking dry skin.    9:52 PM - Upon reassessment, patient is resting comfortably with normal vital signs.  No new complaints at this time.  Discussed results with patient and/or family as well as importance of primary care follow up. She will be discharged home with a prescription for Keflex. She is instructed to use Tylenol and Ibuprofen every six hours as needed for pain. Additionally she is instructed to monitor her blood sugars and drink plenty of fluids. If she has any fever, vomiting, worsening pain or swelling, drainage from wound or other concerns, she is instructed to return to [facility] ED. Patient understands plan of care and strict return precautions for new or changing symptoms.     The patient will return for new or worsening symptoms and is stable at the time of discharge.    The patient is referred to a primary physician for blood pressure management, diabetic screening, and for all other preventative health concerns.    DISPOSITION:  Patient will be discharged home in stable condition.    FOLLOW UP:  [facility]  [address] [place] [id number] [phone]  Schedule an appointment as soon as possible for a visit      [facility], Emergency Dept  [address] [facility] [address] [phone]    If symptoms worsen    IMPRESSION  (L03.119) Cellulitis of foot  (R73.9) Hyperglycemia    Results, diagnoses, and treatment options were discussed with the patient and/or family. Patient verbalized understanding of plan of care.    Discharge Medication List as of 10/8/2017  9:47 PM      START taking these medications    Details   cephALEXin (KEFLEX) 500 MG Cap Take 2 Caps by mouth 3 times a day for 7 days., Disp-42 Cap, R-0, Print Rx Paper            INasrin (Scribe), am  scribing for, and in the presence of, Tory Gibson M.D..    Electronically signed by: Nasrin Edmondson (Scribe), 10/8/2017    ITory M.D. personally performed the services described in this documentation, as scribed by Nasrin Edmondson in my presence, and it is both accurate and complete.    The note accurately reflects work and decisions made by me.  Tory Gibson  10/9/2017  3:00 AM

## 2017-10-09 NOTE — DISCHARGE INSTRUCTIONS
You were seen in the Emergency Department for foot pain.    Xrays were completed without significant acute abnormalities.  Blood sugar is high.    Please use 1,000mg of tylenol or 600mg of ibuprofen every 6 hours as needed for pain.  Take antibiotics as directed.  Watch your blood sugars and drink plenty of fluids.    Please follow up with your primary care physician as soon as possible.    Return to the Emergency Department with fevers, vomiting, worsening pain or swelling, drainage from wound, or other concerns.      Cellulitis  Cellulitis is an infection of the skin and the tissue under the skin. The infected area is usually red and tender. This happens most often in the arms and lower legs.  HOME CARE   · Take your antibiotic medicine as told. Finish the medicine even if you start to feel better.  · Keep the infected arm or leg raised (elevated).  · Put a warm cloth on the area up to 4 times per day.  · Only take medicines as told by your doctor.  · Keep all doctor visits as told.  GET HELP IF:  · You see red streaks on the skin coming from the infected area.  · Your red area gets bigger or turns a dark color.  · Your bone or joint under the infected area is painful after the skin heals.  · Your infection comes back in the same area or different area.  · You have a puffy (swollen) bump in the infected area.  · You have new symptoms.  · You have a fever.  GET HELP RIGHT AWAY IF:   · You feel very sleepy.  · You throw up (vomit) or have watery poop (diarrhea).  · You feel sick and have muscle aches and pains.  MAKE SURE YOU:   · Understand these instructions.  · Will watch your condition.  · Will get help right away if you are not doing well or get worse.     This information is not intended to replace advice given to you by your health care provider. Make sure you discuss any questions you have with your health care provider.     Document Released: 06/05/2009 Document Revised: 01/08/2016 Document Reviewed:  "03/04/2013  [employer] Interactive Patient Education ©2016 [employer]    Hyperglycemia  Hyperglycemia occurs when the glucose (sugar) in your blood is too high. Hyperglycemia can happen for many reasons, but it most often happens to people who do not know they have diabetes or are not managing their diabetes properly.   CAUSES   Whether you have diabetes or not, there are other causes of hyperglycemia. Hyperglycemia can occur when you have diabetes, but it can also occur in other situations that you might not be as aware of, such as:  Diabetes  · If you have diabetes and are having problems controlling your blood glucose, hyperglycemia could occur because of some of the following reasons:  ¨ Not following your meal plan.  ¨ Not taking your diabetes medications or not taking it properly.  ¨ Exercising less or doing less activity than you normally do.  ¨ Being sick.  Pre-diabetes  · This cannot be ignored. Before people develop Type 2 diabetes, they almost always have \"pre-diabetes.\" This is when your blood glucose levels are higher than normal, but not yet high enough to be diagnosed as diabetes. Research has shown that some long-term damage to the body, especially the heart and circulatory system, may already be occurring during pre-diabetes. If you take action to manage your blood glucose when you have pre-diabetes, you may delay or prevent Type 2 diabetes from developing.  Stress  · If you have diabetes, you may be \"diet\" controlled or on oral medications or insulin to control your diabetes. However, you may find that your blood glucose is higher than usual in the hospital whether you have diabetes or not. This is often referred to as \"stress hyperglycemia.\" Stress can elevate your blood glucose. This happens because of hormones put out by the body during times of stress. If stress has been the cause of your high blood glucose, it can be followed regularly by your caregiver. That way he/she can make sure your " "hyperglycemia does not continue to get worse or progress to diabetes.  Steroids  · Steroids are medications that act on the infection fighting system (immune system) to block inflammation or infection. One side effect can be a rise in blood glucose. Most people can produce enough extra insulin to allow for this rise, but for those who cannot, steroids make blood glucose levels go even higher. It is not unusual for steroid treatments to \"uncover\" diabetes that is developing. It is not always possible to determine if the hyperglycemia will go away after the steroids are stopped. A special blood test called an A1c is sometimes done to determine if your blood glucose was elevated before the steroids were started.  SYMPTOMS  · Thirsty.  · Frequent urination.  · Dry mouth.  · Blurred vision.  · Tired or fatigue.  · Weakness.  · Sleepy.  · Tingling in feet or leg.  DIAGNOSIS   Diagnosis is made by monitoring blood glucose in one or all of the following ways:  · A1c test. This is a chemical found in your blood.  · Fingerstick blood glucose monitoring.  · Laboratory results.  TREATMENT   First, knowing the cause of the hyperglycemia is important before the hyperglycemia can be treated. Treatment may include, but is not be limited to:  · Education.  · Change or adjustment in medications.  · Change or adjustment in meal plan.  · Treatment for an illness, infection, etc.  · More frequent blood glucose monitoring.  · Change in exercise plan.  · Decreasing or stopping steroids.  · Lifestyle changes.  HOME CARE INSTRUCTIONS   · Test your blood glucose as directed.  · Exercise regularly. Your caregiver will give you instructions about exercise. Pre-diabetes or diabetes which comes on with stress is helped by exercising.  · Eat wholesome, balanced meals. Eat often and at regular, fixed times. Your caregiver or nutritionist will give you a meal plan to guide your sugar intake.  · Being at an ideal weight is important. If needed, " losing as little as 10 to 15 pounds may help improve blood glucose levels.  SEEK MEDICAL CARE IF:   · You have questions about medicine, activity, or diet.  · You continue to have symptoms (problems such as increased thirst, urination, or weight gain).  SEEK IMMEDIATE MEDICAL CARE IF:   · You are vomiting or have diarrhea.  · Your breath smells fruity.  · You are breathing faster or slower.  · You are very sleepy or incoherent.  · You have numbness, tingling, or pain in your feet or hands.  · You have chest pain.  · Your symptoms get worse even though you have been following your caregiver's orders.  · If you have any other questions or concerns.     This information is not intended to replace advice given to you by your health care provider. Make sure you discuss any questions you have with your health care provider.     Document Released: 06/13/2002 Document Revised: 03/11/2013 Document Reviewed: 04/15/2013  [employer] Interactive Patient Education ©2016 [employer]    Blood Glucose Monitoring, Adult  Monitoring your blood glucose (also know as blood sugar) helps you to manage your diabetes. It also helps you and your health care provider monitor your diabetes and determine how well your treatment plan is working.  WHY SHOULD YOU MONITOR YOUR BLOOD GLUCOSE?  · It can help you understand how food, exercise, and medicine affect your blood glucose.  · It allows you to know what your blood glucose is at any given moment. You can quickly tell if you are having low blood glucose (hypoglycemia) or high blood glucose (hyperglycemia).  · It can help you and your health care provider know how to adjust your medicines.  · It can help you understand how to manage an illness or adjust medicine for exercise.  WHEN SHOULD YOU TEST?  Your health care provider will help you decide how often you should check your blood glucose. This may depend on the type of diabetes you have, your diabetes control, or the types of medicines you  are taking. Be sure to write down all of your blood glucose readings so that this information can be reviewed with your health care provider. See below for examples of testing times that your health care provider may suggest.  Type 1 Diabetes  · Test at least 2 times per day if your diabetes is well controlled, if you are using an insulin pump, or if you perform multiple daily injections.  · If your diabetes is not well controlled or if you are sick, you may need to test more often.  · It is a good idea to also test:  ¨ Before every insulin injection.  ¨ Before and after exercise.  ¨ Between meals and 2 hours after a meal.  ¨ Occasionally between 2:00 a.m. and 3:00 a.m.  Type 2 Diabetes  · If you are taking insulin, test at least 2 times per day. However, it is best to test before every insulin injection.  · If you take medicines by mouth (orally), test 2 times a day.  · If you are on a controlled diet, test once a day.  · If your diabetes is not well controlled or if you are sick, you may need to monitor more often.  HOW TO MONITOR YOUR BLOOD GLUCOSE  Supplies Needed  · Blood glucose meter.  · Test strips for your meter. Each meter has its own strips. You must use the strips that go with your own meter.  · A pricking needle (lancet).  · A device that holds the lancet (lancing device).  · A journal or log book to write down your results.  Procedure  · Wash your hands with soap and water. Alcohol is not preferred.  · Prick the side of your finger (not the tip) with the lancet.  · Gently milk the finger until a small drop of blood appears.  · Follow the instructions that come with your meter for inserting the test strip, applying blood to the strip, and using your blood glucose meter.  Other Areas to Get Blood for Testing  Some meters allow you to use other areas of your body (other than your finger) to test your blood. These areas are called alternative sites. The most common alternative sites are:  · The  "forearm.  · The thigh.  · The back area of the lower leg.  · The palm of the hand.  The blood flow in these areas is slower. Therefore, the blood glucose values you get may be delayed, and the numbers are different from what you would get from your fingers. Do not use alternative sites if you think you are having hypoglycemia. Your reading will not be accurate. Always use a finger if you are having hypoglycemia. Also, if you cannot feel your lows (hypoglycemia unawareness), always use your fingers for your blood glucose checks.  ADDITIONAL TIPS FOR GLUCOSE MONITORING  · Do not reuse lancets.  · Always carry your supplies with you.  · All blood glucose meters have a 24-hour \"hotline\" number to call if you have questions or need help.  · Adjust (calibrate) your blood glucose meter with a control solution after finishing a few boxes of strips.  BLOOD GLUCOSE RECORD KEEPING  It is a good idea to keep a daily record or log of your blood glucose readings. Most glucose meters, if not all, keep your glucose records stored in the meter. Some meters come with the ability to download your records to your home computer. Keeping a record of your blood glucose readings is especially helpful if you are wanting to look for patterns. Make notes to go along with the blood glucose readings because you might forget what happened at that exact time. Keeping good records helps you and your health care provider to work together to achieve good diabetes management.      This information is not intended to replace advice given to you by your health care provider. Make sure you discuss any questions you have with your health care provider.     Document Released: 12/20/2004 Document Revised: 01/08/2016 Document Reviewed: 05/12/2014  [employer] Interactive Patient Education ©2016 [employer]    "

## 2017-10-09 NOTE — ED NOTES
Pt c/o of wound to right posterior foot, pt states she first noticed the wound 4-5 days ago, pt's foot now warm to touch and erythmic

## 2017-10-09 NOTE — ED NOTES
Discharge instructions given and understood by pt, prescriptions given to pt, all questions answered, all belongings sent with pt, pt ambulates out of dept in stable condition

## 2017-10-11 ENCOUNTER — OFFICE VISIT (OUTPATIENT)
Dept: INTERNAL MEDICINE | Facility: MEDICAL CENTER | Age: 57
End: 2017-10-11
Payer: MEDICARE

## 2017-10-11 VITALS
HEART RATE: 82 BPM | HEIGHT: 63 IN | SYSTOLIC BLOOD PRESSURE: 118 MMHG | TEMPERATURE: 98.3 F | OXYGEN SATURATION: 97 % | WEIGHT: 180.4 LBS | BODY MASS INDEX: 31.96 KG/M2 | DIASTOLIC BLOOD PRESSURE: 66 MMHG

## 2017-10-11 DIAGNOSIS — Z00.00 HEALTHCARE MAINTENANCE: ICD-10-CM

## 2017-10-11 DIAGNOSIS — F17.200 SMOKING ADDICTION: ICD-10-CM

## 2017-10-11 DIAGNOSIS — E11.8 TYPE 2 DIABETES MELLITUS WITH COMPLICATION, WITH LONG-TERM CURRENT USE OF INSULIN (HCC): ICD-10-CM

## 2017-10-11 DIAGNOSIS — Z79.4 TYPE 2 DIABETES MELLITUS WITH COMPLICATION, WITH LONG-TERM CURRENT USE OF INSULIN (HCC): ICD-10-CM

## 2017-10-11 DIAGNOSIS — S90.819A ABRASION OF FOOT, UNSPECIFIED LATERALITY, INITIAL ENCOUNTER: ICD-10-CM

## 2017-10-11 PROCEDURE — 99203 OFFICE O/P NEW LOW 30 MIN: CPT | Mod: GE | Performed by: INTERNAL MEDICINE

## 2017-10-11 RX ORDER — RIVAROXABAN 20 MG/1
TABLET, FILM COATED ORAL
COMMUNITY
Start: 2017-08-06 | End: 2017-12-03

## 2017-10-11 RX ORDER — LISINOPRIL 10 MG/1
10 TABLET ORAL DAILY
Status: ON HOLD | COMMUNITY
End: 2018-02-03

## 2017-10-11 RX ORDER — CEPHALEXIN 500 MG/1
500 CAPSULE ORAL 4 TIMES DAILY
Qty: 20 CAP | Refills: 0 | Status: SHIPPED | OUTPATIENT
Start: 2017-10-11 | End: 2017-10-16

## 2017-10-11 RX ORDER — INSULIN GLARGINE 100 [IU]/ML
80 INJECTION, SOLUTION SUBCUTANEOUS EVERY EVENING
COMMUNITY
Start: 2017-10-05 | End: 2017-12-03

## 2017-10-11 RX ORDER — NICOTINE 21 MG/24HR
1 PATCH, TRANSDERMAL 24 HOURS TRANSDERMAL EVERY 24 HOURS
Qty: 30 PATCH | Refills: 0 | Status: SHIPPED | OUTPATIENT
Start: 2017-10-11 | End: 2017-12-03

## 2017-10-11 RX ORDER — DULOXETIN HYDROCHLORIDE 30 MG/1
CAPSULE, DELAYED RELEASE ORAL
COMMUNITY
Start: 2017-08-08 | End: 2017-12-03

## 2017-10-11 ASSESSMENT — PATIENT HEALTH QUESTIONNAIRE - PHQ9
CLINICAL INTERPRETATION OF PHQ2 SCORE: 1
SUM OF ALL RESPONSES TO PHQ QUESTIONS 1-9: 8
5. POOR APPETITE OR OVEREATING: 3 - NEARLY EVERY DAY

## 2017-10-11 NOTE — PROGRESS NOTES
New Patient to Establish    Reason to establish: Acute Illness    CC: heel pain    HPI: 57 year old female history of T2DM, HTN, DVT on Xarelto presents with 1 week of unresolved heel skin cracking and pain. Denies trauma, states feet are dry and cracking causing sharp pain periodically throughout the day. Went to ED 3 days ago, XR right foot negative for osteomyelitis, given 7 day course of Keflex for suspicion of developing cellulitis without active visible infection. Patient states she took Keflex for 3 days and finished course, didn't realize was 7 day course, states may have lost capsules. Uses Neosporin and moisturizing lotion once daily. Denies fever, chills, nausea, vomiting, diarrhea, foot anesthesia. States her diet is poor and home BG in 300s usually but taking metformin and basal insulin daily as prescribed. States 1.5 PPD tobacco since 14 years old, is interested in quitting. Denies EtOH or illicit drugs. Denies dyspnea or calf pain, endorses taking Xarelto every day as prescribed. States she has a fast heart rhythm that she takes a beta blocker for but doesn't know the name but is not a. fib., denies palpitations or chest pain.    There are no active problems to display for this patient.      Past Medical History:   Diagnosis Date   • DM (diabetes mellitus) (CMS-HCC)    • HTN (hypertension)        Current Outpatient Prescriptions   Medication Sig Dispense Refill   • duloxetine (CYMBALTA) 30 MG Cap DR Particles      • XARELTO 20 MG Tab tablet      • BASAGLAR KWIKPEN 100 UNIT/ML Solution Pen-injector injection Inject 80 Units as instructed every evening.     • lisinopril (PRINIVIL) 10 MG Tab Take 10 mg by mouth every day.     • cephALEXin (KEFLEX) 500 MG Cap Take 1 Cap by mouth 4 times a day for 5 days. 20 Cap 0   • nicotine (NICODERM) 21 MG/24HR PATCH 24 HR Apply 1 Patch to skin as directed every 24 hours. 30 Patch 0   • insulin lispro (HUMALOG) 100 UNIT/ML Solution Measure blood glucose fasting,  administer insulin 10 minutes after starting meal    0 Units, 151-200 + 2 Units, 201-250 + 3 Units, 251-300 + 5 Units, 301-350 + 6 Units, 351-400 + 8 Units, Greater than 400 + 9 Units 5 mL 0   • tramadol (ULTRAM) 50 MG Tab Take 1-2 Tabs by mouth every 6 hours as needed for Moderate Pain. 20 Tab 0   • omeprazole (PRILOSEC) 20 MG CPDR Take 20 mg by mouth every day.     • metoprolol (LOPRESSOR) 50 MG TABS Take 50 mg by mouth 2 times a day.     • cyclobenzaprine (FLEXERIL) 10 MG TABS Take 10 mg by mouth 3 times a day as needed.     • clonazepam (KLONOPIN) 1 MG TABS Take 1 mg by mouth 3 times a day.     • metformin SR (GLUCOPHAGE XR) 500 MG TB24 Take 500 mg by mouth 2 times a day.     • hydrOXYzine (ATARAX) 50 MG TABS Take 150 mg by mouth every bedtime.     • spironolactone (ALDACTONE) 100 MG TABS Take 100 mg by mouth every day.     • simvastatin (ZOCOR) 20 MG TABS Take 20 mg by mouth every day.     • glipiZIDE SR (GLUCOTROL) 2.5 MG TB24 Take 2.5 mg by mouth every day.       No current facility-administered medications for this visit.        Allergies as of 10/11/2017 - Reviewed 10/11/2017   Allergen Reaction Noted   • Sulfa drugs Nausea 2017       Social History     Social History   • Marital status:      Spouse name: N/A   • Number of children: N/A   • Years of education: N/A     Occupational History   • Not on file.     Social History Main Topics   • Smoking status: Current Every Day Smoker     Packs/day: 1.00   • Smokeless tobacco: Never Used   • Alcohol use No   • Drug use: No   • Sexual activity: Not on file     Other Topics Concern   • Not on file     Social History Narrative   • No narrative on file       History reviewed. No pertinent family history.    Past Surgical History:   Procedure Laterality Date   • APPENDECTOMY     • CHOLECYSTECTOMY     • HCHG  DELIVERY SER     • HYSTERECTOMY, TOTAL ABDOMINAL     • LAMINOTOMY     • OTHER      back, neck, shoulder surgeries   • OTHER  "ORTHOPEDIC SURGERY         ROS: As per HPI. Additional pertinent symptoms as noted below.      /66   Pulse 82   Temp 36.8 °C (98.3 °F)   Ht 1.6 m (5' 3\")   Wt 81.8 kg (180 lb 6.4 oz)   SpO2 97%   BMI 31.96 kg/m²     Physical Exam  General:  Alert and oriented, No apparent distress.    Eyes: Pupils equal and reactive. No scleral icterus.    Throat: Clear no erythema or exudates noted.    Neck: Supple. No lymphadenopathy noted. Thyroid not enlarged.    Lungs: Clear to auscultation and percussion bilaterally.    Cardiovascular: Regular rate and rhythm. No murmurs, rubs or gallops.    Abdomen:  Benign. No rebound or guarding noted.    Extremities: No clubbing, cyanosis, edema. Three linear 2 cm skin breaks/cracks on each heel without active bleeding, small 3 cm x 0.75 cm patch of erythema on medial aspect of right heel without fluctuance or purulence but TTP, very dry ashy skin of feet and hands    Skin: See Extremities section    Note: I have reviewed all pertinent labs and diagnostic tests associated with this visit with specific comments listed under the assessment and plan below    Assessment and Plan    1. Abrasion of foot, unspecified laterality, initial encounter  Multiple cracks in each heel from dry skin, poor healing due to location. Concern for very small patch of cellulitis on right heel.  - Keflex 500 mg q6h 5 day course (already completed ~2-3 day course from ED Rx but ?lost remaining)  - Petroleum jelly on feet twice daily  - Podiatry referral for foot care, Dr. Mahmood [phone] as he takes patient's insurance     2. Type 2 diabetes mellitus with complication, with long-term current use of insulin (CMS-[facility])  Poorly controlled IDDM due to poor diet per patient. Was on correctional insulin the past but BG improved so stopped, very familiar with how to administer. Last A1c 11.7 four years ago. Last few BG on chemistries in 300s-400s. Home BG 300s last 7 days per patient. Declines " nutrition/dietician consult, does not want to change diet. Counseled on importance of diet and controlling of BG.  - Lispro correctional insulin since patient not willing to change diet and diet may fluctuate in the future:  0 Units, 151-200 + 2 Units, 201-250 + 3 Units, 251-300 + 5 Units, 301-350 + 6 Units, 351-400 + 8 Units, Greater than 400 + 9 Units  - Educated patient correctional insulin scale, knows how to administer as has done so in past and does basal currently  - HgA1c, albumin:Cr   - Had normal eye exam 1 month ago, repeat in 11 months    3. Healthcare maintenance  Acute issues focused on this visit. Address HCM in 5 weeks.  - Lipids, CBC, CMP    4. Smoking addiction  Previously tried quitting, wants to try again. Says patch worked well before.  - Nicotine patch      Followup: Return in about 5 weeks (around 11/15/2017).    Risk Assessment (discuss potential complications a function of chronic problems): potential for sequelae of uncontrolled diabetes with patient unwilling to change diet, attempting short term improvement in BG using correctional insulin    Complexity (discuss number of co-morbidities): 5    Signed by: Tiago Higgins M.D.

## 2017-12-01 ENCOUNTER — HOSPITAL ENCOUNTER (EMERGENCY)
Facility: MEDICAL CENTER | Age: 57
End: 2017-12-01
Attending: EMERGENCY MEDICINE
Payer: MEDICARE

## 2017-12-01 VITALS
HEART RATE: 109 BPM | WEIGHT: 174.38 LBS | OXYGEN SATURATION: 94 % | HEIGHT: 62 IN | DIASTOLIC BLOOD PRESSURE: 64 MMHG | BODY MASS INDEX: 32.09 KG/M2 | RESPIRATION RATE: 17 BRPM | TEMPERATURE: 97.5 F | SYSTOLIC BLOOD PRESSURE: 96 MMHG

## 2017-12-01 DIAGNOSIS — H92.09 OTALGIA, UNSPECIFIED LATERALITY: ICD-10-CM

## 2017-12-01 DIAGNOSIS — J01.10 ACUTE NON-RECURRENT FRONTAL SINUSITIS: ICD-10-CM

## 2017-12-01 PROCEDURE — 99283 EMERGENCY DEPT VISIT LOW MDM: CPT

## 2017-12-01 RX ORDER — AMOXICILLIN AND CLAVULANATE POTASSIUM 562.5; 437.5; 62.5 MG/1; MG/1; MG/1
1 TABLET, MULTILAYER, EXTENDED RELEASE ORAL 2 TIMES DAILY
Qty: 20 TAB | Refills: 0 | Status: SHIPPED | OUTPATIENT
Start: 2017-12-01 | End: 2017-12-01

## 2017-12-01 RX ORDER — AMOXICILLIN AND CLAVULANATE POTASSIUM 875; 125 MG/1; MG/1
1 TABLET, FILM COATED ORAL 2 TIMES DAILY
Qty: 14 TAB | Refills: 0 | Status: SHIPPED | OUTPATIENT
Start: 2017-12-01 | End: 2017-12-08

## 2017-12-01 ASSESSMENT — ENCOUNTER SYMPTOMS
FEVER: 0
SINUS PAIN: 1
COUGH: 0
ABDOMINAL PAIN: 0
SINUS PRESSURE: 1

## 2017-12-01 ASSESSMENT — PAIN SCALES - GENERAL: PAINLEVEL_OUTOF10: 4

## 2017-12-01 NOTE — DISCHARGE INSTRUCTIONS
Earache  An earache, also called otalgia, can be caused by many things. Pain from an earache can be sharp, dull, or burning. The pain may be temporary or constant.  Earaches can be caused by problems with the ear, such as infection in either the middle ear or the ear canal, injury, impacted ear wax, middle ear pressure, or a foreign body in the ear. Ear pain can also result from problems in other areas. This is called referred pain. For example, pain can come from a sore throat, a tooth infection, or problems with the jaw or the joint between the jaw and the skull (temporomandibular joint, or TMJ).  The cause of an earache is not always easy to identify. Watchful waiting may be appropriate for some earaches until a clear cause of the pain can be found.  HOME CARE INSTRUCTIONS  Watch your condition for any changes. The following actions may help to lessen any discomfort that you are feeling:  · Take medicines only as directed by your health care provider. This includes ear drops.  · Apply ice to your outer ear to help reduce pain.  ¨ Put ice in a plastic bag.  ¨ Place a towel between your skin and the bag.  ¨ Leave the ice on for 20 minutes, 2-3 times per day.  · Do not put anything in your ear other than medicine that is prescribed by your health care provider.  · Try resting in an upright position instead of lying down. This may help to reduce pressure in the middle ear and relieve pain.  · Chew gum if it helps to relieve your ear pain.  · Control any allergies that you have.  · Keep all follow-up visits as directed by your health care provider. This is important.  SEEK MEDICAL CARE IF:  · Your pain does not improve within 2 days.  · You have a fever.  · You have new or worsening symptoms.  SEEK IMMEDIATE MEDICAL CARE IF:  · You have a severe headache.  · You have a stiff neck.  · You have difficulty swallowing.  · You have redness or swelling behind your ear.  · You have drainage from your ear.  · You have hearing  loss.  · You feel dizzy.     This information is not intended to replace advice given to you by your health care provider. Make sure you discuss any questions you have with your health care provider.     Document Released: 08/04/2005 Document Revised: 01/08/2016 Document Reviewed: 07/19/2015  [employer] Interactive Patient Education ©2016 [employer]      Sinusitis, Adult  Sinusitis is redness, soreness, and puffiness (inflammation) of the air pockets in the bones of your face (sinuses). The redness, soreness, and puffiness can cause air and mucus to get trapped in your sinuses. This can allow germs to grow and cause an infection.   HOME CARE   · Drink enough fluids to keep your pee (urine) clear or pale yellow.  · Use a humidifier in your home.  · Run a hot shower to create steam in the bathroom. Sit in the bathroom with the door closed. Breathe in the steam 3-4 times a day.  · Put a warm, moist washcloth on your face 3-4 times a day, or as told by your doctor.  · Use salt water sprays (saline sprays) to wet the thick fluid in your nose. This can help the sinuses drain.  · Only take medicine as told by your doctor.  GET HELP RIGHT AWAY IF:   · Your pain gets worse.  · You have very bad headaches.  · You are sick to your stomach (nauseous).  · You throw up (vomit).  · You are very sleepy (drowsy) all the time.  · Your face is puffy (swollen).  · Your vision changes.  · You have a stiff neck.  · You have trouble breathing.  MAKE SURE YOU:   · Understand these instructions.  · Will watch your condition.  · Will get help right away if you are not doing well or get worse.     This information is not intended to replace advice given to you by your health care provider. Make sure you discuss any questions you have with your health care provider.     Document Released: 06/05/2009 Document Revised: 01/08/2016 Document Reviewed: 07/23/2013  [employer] Interactive Patient Education ©2016 [employer]

## 2017-12-01 NOTE — ED PROVIDER NOTES
"ED Provider Note    ED Provider Note          CHIEF COMPLAINT  Chief Complaint   Patient presents with   • Ear Pain     \"For the last couple days my ears feel clogged, and my equilabrium feels off. I fell twice today.\" Pt denies any pain r/t a fall.    • Nausea       HPI  Vin Maciel is a 57 y.o. female who presents to the Emergency DepartmentWho comes in for 3 days of sinus pain and pressure now with some worsening right ear pain. She says that her ears feel clogged like she cannot pop them. She denies any fevers or chills. She denies any cough. Just some chronic sinus pain as well as now the pain in her right ear.  REVIEW OF SYSTEMS  Review of Systems   Constitutional: Negative for fever.   HENT: Positive for congestion, ear pain, sinus pain and sinus pressure.    Respiratory: Negative for cough.    Cardiovascular: Negative for chest pain.   Gastrointestinal: Negative for abdominal pain.   Skin: Negative for rash.   Hematological:        History of DVT       PAST MEDICAL HISTORY   has a past medical history of DM (diabetes mellitus) (CMS-HCC) and HTN (hypertension).    SURGICAL HISTORY   has a past surgical history that includes other; hysterectomy, total abdominal; cholecystectomy; appendectomy;  DELIVERY SER; other orthopedic surgery; and laminotomy.    SOCIAL HISTORY  Social History   Substance Use Topics   • Smoking status: Current Every Day Smoker     Packs/day: 1.00   • Smokeless tobacco: Never Used   • Alcohol use No      History   Drug Use No       FAMILY HISTORY  History reviewed. No pertinent family history.    CURRENT MEDICATIONS  Reviewed.  See Encounter Summary.     ALLERGIES  Allergies   Allergen Reactions   • Sulfa Drugs Nausea     N/V nervousness       PHYSICAL EXAM  VITAL SIGNS: BP (!) 96/64   Pulse (!) 109   Temp 36.4 °C (97.5 °F)   Resp 17   Ht 1.575 m (5' 2\")   Wt 79.1 kg (174 lb 6.1 oz)   SpO2 94%   BMI 31.90 kg/m²   Physical Exam   Constitutional: She is oriented to person, " "place, and time. No distress.   HENT:   Head: Normocephalic and atraumatic.   Right Ear: Tympanic membrane and external ear normal.   Left Ear: Tympanic membrane and external ear normal.   Nose: Right sinus exhibits frontal sinus tenderness. Left sinus exhibits frontal sinus tenderness.   Mouth/Throat: Oropharynx is clear and moist.   Eyes: Conjunctivae are normal. Pupils are equal, round, and reactive to light.   Neck: Normal range of motion.   Cardiovascular: Normal rate.    Pulmonary/Chest: Effort normal.   Abdominal: Soft.   Musculoskeletal: Normal range of motion.   Neurological: She is alert and oriented to person, place, and time.   Skin: Skin is warm. She is not diaphoretic.   Psychiatric: She has a normal mood and affect.           COURSE & MEDICAL DECISION MAKING  Pertinent Labs & Imaging studies reviewed. (See chart for details)    5:19 AM - Patient seen and examined at bedside.     Differential Diagnosis: Viral syndrome, sinusitis, otitis media, meningitis    Decision Making:  This is a 57 y.o. year old female who presents with concern of 3 days of sinus pain and ear pressure. She presented here initially afebrile without any tachycardia. Her blood pressure was slightly low but patient said that she always \"runs low\" and had no dizziness or lightheadedness. When I saw her she became little anxious and she did get some tachycardia however she was nontoxic appearing and otherwise felt well. She little bit of sinus congestion as well as some sinus pressure but her TMs appeared clear without any signs of otitis media or otitis externa. She had no neck stiffness or pain or headache. She most likely has sinusitis however she has no headache and does not appear sick. She had some intermittent tachycardia in the ER but it did resolve and no fevers. Therefore She will discharged home with amitotics     DISPOSITION:  Patient will be discharged home in stable condition.    FOLLOW UP:  Tiago Higgins M.D.  [address]  [facility] [address]  [place] [address]  [phone]      If symptoms worsen      OUTPATIENT MEDICATIONS:  Discharge Medication List as of 12/1/2017  5:46 AM      START taking these medications    Details   amoxicillin-clavulanate (AUGMENTIN) 875-125 MG Tab Take 1 Tab by mouth 2 times a day for 7 days., Disp-14 Tab, R-0, Print Rx Paper                 FINAL IMPRESSION  1. Acute non-recurrent frontal sinusitis    2. Otalgia, unspecified laterality

## 2017-12-01 NOTE — ED NOTES
"Chief Complaint   Patient presents with   • Ear Pain     \"For the last couple days my ears feel clogged, and my equilabrium feels off. I fell twice today.\" Pt denies any pain r/t a fall.    • Nausea     Pt ambulatory to triage with steady gait and above cc. Pt axo x4. VSS with noted tachycardia.    BP (!) 96/64   Pulse (!) 120   Temp 37.1 °C (98.7 °F)   Resp 16   Ht 1.575 m (5' 2\")   Wt 79.1 kg (174 lb 6.1 oz)   SpO2 93%   BMI 31.90 kg/m²     Pt back to lobby, informed to notify staff of any changes.     "

## 2017-12-03 ENCOUNTER — HOSPITAL ENCOUNTER (INPATIENT)
Facility: MEDICAL CENTER | Age: 57
LOS: 1 days | DRG: 637 | End: 2017-12-04
Attending: EMERGENCY MEDICINE | Admitting: INTERNAL MEDICINE
Payer: MEDICARE

## 2017-12-03 ENCOUNTER — RESOLUTE PROFESSIONAL BILLING HOSPITAL PROF FEE (OUTPATIENT)
Dept: MEDSURG UNIT | Facility: MEDICAL CENTER | Age: 57
End: 2017-12-03
Payer: MEDICARE

## 2017-12-03 DIAGNOSIS — R53.1 WEAKNESS GENERALIZED: ICD-10-CM

## 2017-12-03 DIAGNOSIS — R79.89 PRERENAL AZOTEMIA: ICD-10-CM

## 2017-12-03 DIAGNOSIS — R73.9 HYPERGLYCEMIA: ICD-10-CM

## 2017-12-03 LAB
ALBUMIN SERPL BCP-MCNC: 4 G/DL (ref 3.2–4.9)
ALBUMIN/GLOB SERPL: 1.2 G/DL
ALP SERPL-CCNC: 140 U/L (ref 30–99)
ALT SERPL-CCNC: 16 U/L (ref 2–50)
ANION GAP SERPL CALC-SCNC: 14 MMOL/L (ref 0–11.9)
AST SERPL-CCNC: 21 U/L (ref 12–45)
BASOPHILS # BLD AUTO: 0.7 % (ref 0–1.8)
BASOPHILS # BLD: 0.06 K/UL (ref 0–0.12)
BILIRUB SERPL-MCNC: 2 MG/DL (ref 0.1–1.5)
BUN SERPL-MCNC: 36 MG/DL (ref 8–22)
CALCIUM SERPL-MCNC: 9.3 MG/DL (ref 8.5–10.5)
CHLORIDE SERPL-SCNC: 79 MMOL/L (ref 96–112)
CO2 SERPL-SCNC: 23 MMOL/L (ref 20–33)
CREAT SERPL-MCNC: 2.03 MG/DL (ref 0.5–1.4)
EOSINOPHIL # BLD AUTO: 0.05 K/UL (ref 0–0.51)
EOSINOPHIL NFR BLD: 0.6 % (ref 0–6.9)
ERYTHROCYTE [DISTWIDTH] IN BLOOD BY AUTOMATED COUNT: 39.8 FL (ref 35.9–50)
GFR SERPL CREATININE-BSD FRML MDRD: 25 ML/MIN/1.73 M 2
GLOBULIN SER CALC-MCNC: 3.3 G/DL (ref 1.9–3.5)
GLUCOSE BLD-MCNC: >600 MG/DL (ref 65–99)
GLUCOSE BLD-MCNC: >600 MG/DL (ref 65–99)
GLUCOSE SERPL-MCNC: 1085 MG/DL (ref 65–99)
HCT VFR BLD AUTO: 45.9 % (ref 37–47)
HGB BLD-MCNC: 14.7 G/DL (ref 12–16)
IMM GRANULOCYTES # BLD AUTO: 0.01 K/UL (ref 0–0.11)
IMM GRANULOCYTES NFR BLD AUTO: 0.1 % (ref 0–0.9)
LYMPHOCYTES # BLD AUTO: 2.53 K/UL (ref 1–4.8)
LYMPHOCYTES NFR BLD: 31.5 % (ref 22–41)
MCH RBC QN AUTO: 24.7 PG (ref 27–33)
MCHC RBC AUTO-ENTMCNC: 32 G/DL (ref 33.6–35)
MCV RBC AUTO: 77.3 FL (ref 81.4–97.8)
MONOCYTES # BLD AUTO: 0.75 K/UL (ref 0–0.85)
MONOCYTES NFR BLD AUTO: 9.3 % (ref 0–13.4)
NEUTROPHILS # BLD AUTO: 4.63 K/UL (ref 2–7.15)
NEUTROPHILS NFR BLD: 57.8 % (ref 44–72)
NRBC # BLD AUTO: 0 K/UL
NRBC BLD AUTO-RTO: 0 /100 WBC
OSMOLALITY SERPL: 324 MOSM/KG H2O (ref 278–298)
PLATELET # BLD AUTO: 198 K/UL (ref 164–446)
PMV BLD AUTO: 11.2 FL (ref 9–12.9)
POTASSIUM SERPL-SCNC: 5.6 MMOL/L (ref 3.6–5.5)
PROT SERPL-MCNC: 7.3 G/DL (ref 6–8.2)
RBC # BLD AUTO: 5.94 M/UL (ref 4.2–5.4)
SODIUM SERPL-SCNC: 116 MMOL/L (ref 135–145)
TROPONIN I SERPL-MCNC: <0.01 NG/ML (ref 0–0.04)
WBC # BLD AUTO: 8 K/UL (ref 4.8–10.8)

## 2017-12-03 PROCEDURE — 700102 HCHG RX REV CODE 250 W/ 637 OVERRIDE(OP): Performed by: STUDENT IN AN ORGANIZED HEALTH CARE EDUCATION/TRAINING PROGRAM

## 2017-12-03 PROCEDURE — 84484 ASSAY OF TROPONIN QUANT: CPT

## 2017-12-03 PROCEDURE — 96361 HYDRATE IV INFUSION ADD-ON: CPT

## 2017-12-03 PROCEDURE — 96360 HYDRATION IV INFUSION INIT: CPT

## 2017-12-03 PROCEDURE — 82962 GLUCOSE BLOOD TEST: CPT

## 2017-12-03 PROCEDURE — 770006 HCHG ROOM/CARE - MED/SURG/GYN SEMI*

## 2017-12-03 PROCEDURE — A9270 NON-COVERED ITEM OR SERVICE: HCPCS | Performed by: STUDENT IN AN ORGANIZED HEALTH CARE EDUCATION/TRAINING PROGRAM

## 2017-12-03 PROCEDURE — 99285 EMERGENCY DEPT VISIT HI MDM: CPT

## 2017-12-03 PROCEDURE — 85025 COMPLETE CBC W/AUTO DIFF WBC: CPT

## 2017-12-03 PROCEDURE — 96372 THER/PROPH/DIAG INJ SC/IM: CPT

## 2017-12-03 PROCEDURE — 700105 HCHG RX REV CODE 258: Performed by: STUDENT IN AN ORGANIZED HEALTH CARE EDUCATION/TRAINING PROGRAM

## 2017-12-03 PROCEDURE — 80053 COMPREHEN METABOLIC PANEL: CPT

## 2017-12-03 PROCEDURE — 83930 ASSAY OF BLOOD OSMOLALITY: CPT

## 2017-12-03 PROCEDURE — 700105 HCHG RX REV CODE 258: Performed by: EMERGENCY MEDICINE

## 2017-12-03 RX ORDER — DEXTROSE MONOHYDRATE 25 G/50ML
25 INJECTION, SOLUTION INTRAVENOUS
Status: DISCONTINUED | OUTPATIENT
Start: 2017-12-03 | End: 2017-12-05 | Stop reason: HOSPADM

## 2017-12-03 RX ORDER — DULOXETIN HYDROCHLORIDE 30 MG/1
30 CAPSULE, DELAYED RELEASE ORAL 2 TIMES DAILY
COMMUNITY
End: 2018-01-01 | Stop reason: SDUPTHER

## 2017-12-03 RX ORDER — BISACODYL 10 MG
10 SUPPOSITORY, RECTAL RECTAL
Status: DISCONTINUED | OUTPATIENT
Start: 2017-12-03 | End: 2017-12-05 | Stop reason: HOSPADM

## 2017-12-03 RX ORDER — ACETAMINOPHEN 325 MG/1
650 TABLET ORAL EVERY 6 HOURS PRN
Status: DISCONTINUED | OUTPATIENT
Start: 2017-12-03 | End: 2017-12-05 | Stop reason: HOSPADM

## 2017-12-03 RX ORDER — SODIUM CHLORIDE 9 MG/ML
1000 INJECTION, SOLUTION INTRAVENOUS ONCE
Status: COMPLETED | OUTPATIENT
Start: 2017-12-03 | End: 2017-12-03

## 2017-12-03 RX ORDER — LISINOPRIL 10 MG/1
10 TABLET ORAL DAILY
Status: DISCONTINUED | OUTPATIENT
Start: 2017-12-03 | End: 2017-12-03

## 2017-12-03 RX ORDER — CYCLOBENZAPRINE HCL 10 MG
10 TABLET ORAL 3 TIMES DAILY PRN
Status: DISCONTINUED | OUTPATIENT
Start: 2017-12-03 | End: 2017-12-03

## 2017-12-03 RX ORDER — POLYETHYLENE GLYCOL 3350 17 G/17G
1 POWDER, FOR SOLUTION ORAL
Status: DISCONTINUED | OUTPATIENT
Start: 2017-12-03 | End: 2017-12-05 | Stop reason: HOSPADM

## 2017-12-03 RX ORDER — OMEPRAZOLE 20 MG/1
20 CAPSULE, DELAYED RELEASE ORAL DAILY
Status: DISCONTINUED | OUTPATIENT
Start: 2017-12-03 | End: 2017-12-03

## 2017-12-03 RX ORDER — INSULIN GLARGINE 100 [IU]/ML
80 INJECTION, SOLUTION SUBCUTANEOUS EVERY EVENING
Status: DISCONTINUED | OUTPATIENT
Start: 2017-12-03 | End: 2017-12-05 | Stop reason: HOSPADM

## 2017-12-03 RX ORDER — SODIUM CHLORIDE 9 MG/ML
1000 INJECTION, SOLUTION INTRAVENOUS ONCE
Status: DISPENSED | OUTPATIENT
Start: 2017-12-03 | End: 2017-12-04

## 2017-12-03 RX ORDER — TRAMADOL HYDROCHLORIDE 50 MG/1
50-100 TABLET ORAL EVERY 6 HOURS PRN
Status: DISCONTINUED | OUTPATIENT
Start: 2017-12-03 | End: 2017-12-03

## 2017-12-03 RX ORDER — LABETALOL HYDROCHLORIDE 5 MG/ML
10 INJECTION, SOLUTION INTRAVENOUS EVERY 4 HOURS PRN
Status: DISCONTINUED | OUTPATIENT
Start: 2017-12-03 | End: 2017-12-05 | Stop reason: HOSPADM

## 2017-12-03 RX ORDER — AMOXICILLIN 250 MG
2 CAPSULE ORAL 2 TIMES DAILY
Status: DISCONTINUED | OUTPATIENT
Start: 2017-12-03 | End: 2017-12-05 | Stop reason: HOSPADM

## 2017-12-03 RX ORDER — SPIRONOLACTONE 25 MG/1
100 TABLET ORAL DAILY
Status: DISCONTINUED | OUTPATIENT
Start: 2017-12-03 | End: 2017-12-03

## 2017-12-03 RX ORDER — METOPROLOL TARTRATE 50 MG/1
50 TABLET, FILM COATED ORAL 2 TIMES DAILY
Status: DISCONTINUED | OUTPATIENT
Start: 2017-12-03 | End: 2017-12-05 | Stop reason: HOSPADM

## 2017-12-03 RX ORDER — SODIUM CHLORIDE 9 MG/ML
INJECTION, SOLUTION INTRAVENOUS CONTINUOUS
Status: DISCONTINUED | OUTPATIENT
Start: 2017-12-03 | End: 2017-12-05 | Stop reason: HOSPADM

## 2017-12-03 RX ORDER — NICOTINE 21 MG/24HR
14 PATCH, TRANSDERMAL 24 HOURS TRANSDERMAL
Status: DISCONTINUED | OUTPATIENT
Start: 2017-12-04 | End: 2017-12-05 | Stop reason: HOSPADM

## 2017-12-03 RX ORDER — SIMVASTATIN 40 MG
20 TABLET ORAL DAILY
Status: DISCONTINUED | OUTPATIENT
Start: 2017-12-03 | End: 2017-12-05 | Stop reason: HOSPADM

## 2017-12-03 RX ORDER — DULOXETIN HYDROCHLORIDE 30 MG/1
30 CAPSULE, DELAYED RELEASE ORAL DAILY
Status: DISCONTINUED | OUTPATIENT
Start: 2017-12-03 | End: 2017-12-05 | Stop reason: HOSPADM

## 2017-12-03 RX ADMIN — INSULIN LISPRO 10 UNITS: 100 INJECTION, SOLUTION INTRAVENOUS; SUBCUTANEOUS at 19:53

## 2017-12-03 RX ADMIN — SODIUM CHLORIDE 1000 ML: 9 INJECTION, SOLUTION INTRAVENOUS at 17:45

## 2017-12-03 RX ADMIN — METOPROLOL TARTRATE 50 MG: 50 TABLET, FILM COATED ORAL at 22:04

## 2017-12-03 RX ADMIN — SODIUM CHLORIDE 1000 ML: 9 INJECTION, SOLUTION INTRAVENOUS at 19:30

## 2017-12-03 RX ADMIN — INSULIN GLARGINE 80 UNITS: 100 INJECTION, SOLUTION SUBCUTANEOUS at 22:09

## 2017-12-03 RX ADMIN — RIVAROXABAN 20 MG: 20 TABLET, FILM COATED ORAL at 22:04

## 2017-12-03 RX ADMIN — SODIUM CHLORIDE: 9 INJECTION, SOLUTION INTRAVENOUS at 21:58

## 2017-12-03 RX ADMIN — DULOXETINE HYDROCHLORIDE 30 MG: 30 CAPSULE, DELAYED RELEASE ORAL at 22:04

## 2017-12-03 RX ADMIN — SIMVASTATIN 20 MG: 40 TABLET, FILM COATED ORAL at 22:04

## 2017-12-03 ASSESSMENT — PATIENT HEALTH QUESTIONNAIRE - PHQ9
6. FEELING BAD ABOUT YOURSELF - OR THAT YOU ARE A FAILURE OR HAVE LET YOURSELF OR YOUR FAMILY DOWN: MORE THAN HALF THE DAYS
9. THOUGHTS THAT YOU WOULD BE BETTER OFF DEAD, OR OF HURTING YOURSELF: NOT AT ALL
7. TROUBLE CONCENTRATING ON THINGS, SUCH AS READING THE NEWSPAPER OR WATCHING TELEVISION: SEVERAL DAYS
1. LITTLE INTEREST OR PLEASURE IN DOING THINGS: SEVERAL DAYS
SUM OF ALL RESPONSES TO PHQ QUESTIONS 1-9: 12
4. FEELING TIRED OR HAVING LITTLE ENERGY: MORE THAN HALF THE DAYS
3. TROUBLE FALLING OR STAYING ASLEEP OR SLEEPING TOO MUCH: NEARLY EVERY DAY
2. FEELING DOWN, DEPRESSED, IRRITABLE, OR HOPELESS: SEVERAL DAYS
8. MOVING OR SPEAKING SO SLOWLY THAT OTHER PEOPLE COULD HAVE NOTICED. OR THE OPPOSITE, BEING SO FIGETY OR RESTLESS THAT YOU HAVE BEEN MOVING AROUND A LOT MORE THAN USUAL: NOT AT ALL
SUM OF ALL RESPONSES TO PHQ9 QUESTIONS 1 AND 2: 2
5. POOR APPETITE OR OVEREATING: MORE THAN HALF THE DAYS

## 2017-12-03 ASSESSMENT — LIFESTYLE VARIABLES
TOTAL SCORE: 0
HAVE PEOPLE ANNOYED YOU BY CRITICIZING YOUR DRINKING: NO
EVER FELT BAD OR GUILTY ABOUT YOUR DRINKING: NO
EVER HAD A DRINK FIRST THING IN THE MORNING TO STEADY YOUR NERVES TO GET RID OF A HANGOVER: NO
DO YOU DRINK ALCOHOL: NO
AVERAGE NUMBER OF DAYS PER WEEK YOU HAVE A DRINK CONTAINING ALCOHOL: 0
TOTAL SCORE: 0
ON A TYPICAL DAY WHEN YOU DRINK ALCOHOL HOW MANY DRINKS DO YOU HAVE: 0
ALCOHOL_USE: NO
EVER_SMOKED: YES
CONSUMPTION TOTAL: INCOMPLETE
TOTAL SCORE: 0
HAVE YOU EVER FELT YOU SHOULD CUT DOWN ON YOUR DRINKING: NO

## 2017-12-03 ASSESSMENT — ENCOUNTER SYMPTOMS
FEVER: 0
WEAKNESS: 1
SENSORY CHANGE: 0
VOMITING: 0
ABDOMINAL PAIN: 0
POLYDIPSIA: 1
NAUSEA: 0
CONSTIPATION: 0
MYALGIAS: 0
CHILLS: 0
COUGH: 0
SORE THROAT: 0
BLURRED VISION: 0
FOCAL WEAKNESS: 0
DEPRESSION: 1
DIARRHEA: 0
SPEECH CHANGE: 0
SHORTNESS OF BREATH: 0

## 2017-12-03 ASSESSMENT — PAIN SCALES - GENERAL
PAINLEVEL_OUTOF10: 0
PAINLEVEL_OUTOF10: 0

## 2017-12-04 VITALS
OXYGEN SATURATION: 94 % | HEIGHT: 62 IN | TEMPERATURE: 98.2 F | BODY MASS INDEX: 30.73 KG/M2 | HEART RATE: 85 BPM | RESPIRATION RATE: 16 BRPM | DIASTOLIC BLOOD PRESSURE: 83 MMHG | WEIGHT: 167 LBS | SYSTOLIC BLOOD PRESSURE: 118 MMHG

## 2017-12-04 PROBLEM — N18.30 CKD (CHRONIC KIDNEY DISEASE) STAGE 3, GFR 30-59 ML/MIN: Status: ACTIVE | Noted: 2017-12-04

## 2017-12-04 PROBLEM — E78.5 HYPERLIPIDEMIA: Status: ACTIVE | Noted: 2017-12-04

## 2017-12-04 PROBLEM — N17.9 ACUTE KIDNEY INJURY SUPERIMPOSED ON CHRONIC KIDNEY DISEASE (HCC): Status: ACTIVE | Noted: 2017-12-04

## 2017-12-04 PROBLEM — N18.9 ACUTE KIDNEY INJURY SUPERIMPOSED ON CHRONIC KIDNEY DISEASE (HCC): Status: ACTIVE | Noted: 2017-12-04

## 2017-12-04 PROBLEM — E78.1 HYPERTRIGLYCERIDEMIA: Status: ACTIVE | Noted: 2017-12-04

## 2017-12-04 PROBLEM — F32.A DEPRESSION: Status: ACTIVE | Noted: 2017-12-04

## 2017-12-04 PROBLEM — E13.00 HYPEROSMOLARITY DUE TO SECONDARY DIABETES MELLITUS (HCC): Status: ACTIVE | Noted: 2017-12-04

## 2017-12-04 PROBLEM — Z86.718 HISTORY OF DVT (DEEP VEIN THROMBOSIS): Status: ACTIVE | Noted: 2017-12-04

## 2017-12-04 PROBLEM — I10 HYPERTENSION: Status: ACTIVE | Noted: 2017-12-04

## 2017-12-04 PROBLEM — E11.9 DIABETES (HCC): Status: ACTIVE | Noted: 2017-12-04

## 2017-12-04 LAB
ALBUMIN SERPL BCP-MCNC: 3.4 G/DL (ref 3.2–4.9)
ALBUMIN/GLOB SERPL: 1.2 G/DL
ALP SERPL-CCNC: 115 U/L (ref 30–99)
ALT SERPL-CCNC: 16 U/L (ref 2–50)
AMPHET UR QL SCN: NEGATIVE
ANION GAP SERPL CALC-SCNC: 10 MMOL/L (ref 0–11.9)
ANION GAP SERPL CALC-SCNC: 6 MMOL/L (ref 0–11.9)
ANION GAP SERPL CALC-SCNC: 8 MMOL/L (ref 0–11.9)
APPEARANCE UR: CLEAR
AST SERPL-CCNC: 16 U/L (ref 12–45)
BARBITURATES UR QL SCN: NEGATIVE
BASOPHILS # BLD AUTO: 0.7 % (ref 0–1.8)
BASOPHILS # BLD: 0.07 K/UL (ref 0–0.12)
BENZODIAZ UR QL SCN: NEGATIVE
BILIRUB SERPL-MCNC: 1.5 MG/DL (ref 0.1–1.5)
BILIRUB UR QL STRIP.AUTO: NEGATIVE
BUN SERPL-MCNC: 26 MG/DL (ref 8–22)
BUN SERPL-MCNC: 29 MG/DL (ref 8–22)
BUN SERPL-MCNC: 30 MG/DL (ref 8–22)
BZE UR QL SCN: NEGATIVE
CALCIUM SERPL-MCNC: 8.8 MG/DL (ref 8.5–10.5)
CALCIUM SERPL-MCNC: 8.8 MG/DL (ref 8.5–10.5)
CALCIUM SERPL-MCNC: 8.9 MG/DL (ref 8.5–10.5)
CANNABINOIDS UR QL SCN: NEGATIVE
CHLORIDE SERPL-SCNC: 100 MMOL/L (ref 96–112)
CHLORIDE SERPL-SCNC: 97 MMOL/L (ref 96–112)
CHLORIDE SERPL-SCNC: 98 MMOL/L (ref 96–112)
CO2 SERPL-SCNC: 26 MMOL/L (ref 20–33)
CO2 SERPL-SCNC: 29 MMOL/L (ref 20–33)
CO2 SERPL-SCNC: 31 MMOL/L (ref 20–33)
COLOR UR: YELLOW
CREAT SERPL-MCNC: 1.04 MG/DL (ref 0.5–1.4)
CREAT SERPL-MCNC: 1.24 MG/DL (ref 0.5–1.4)
CREAT SERPL-MCNC: 1.43 MG/DL (ref 0.5–1.4)
CULTURE IF INDICATED INDCX: NO UA CULTURE
EOSINOPHIL # BLD AUTO: 0.22 K/UL (ref 0–0.51)
EOSINOPHIL NFR BLD: 2.3 % (ref 0–6.9)
ERYTHROCYTE [DISTWIDTH] IN BLOOD BY AUTOMATED COUNT: 36.7 FL (ref 35.9–50)
EST. AVERAGE GLUCOSE BLD GHB EST-MCNC: 401 MG/DL
GFR SERPL CREATININE-BSD FRML MDRD: 38 ML/MIN/1.73 M 2
GFR SERPL CREATININE-BSD FRML MDRD: 44 ML/MIN/1.73 M 2
GFR SERPL CREATININE-BSD FRML MDRD: 54 ML/MIN/1.73 M 2
GLOBULIN SER CALC-MCNC: 2.8 G/DL (ref 1.9–3.5)
GLUCOSE BLD-MCNC: 176 MG/DL (ref 65–99)
GLUCOSE BLD-MCNC: 214 MG/DL (ref 65–99)
GLUCOSE BLD-MCNC: 244 MG/DL (ref 65–99)
GLUCOSE BLD-MCNC: 418 MG/DL (ref 65–99)
GLUCOSE SERPL-MCNC: 165 MG/DL (ref 65–99)
GLUCOSE SERPL-MCNC: 251 MG/DL (ref 65–99)
GLUCOSE SERPL-MCNC: 286 MG/DL (ref 65–99)
GLUCOSE UR STRIP.AUTO-MCNC: >=1000 MG/DL
HBA1C MFR BLD: 15.6 % (ref 0–5.6)
HCT VFR BLD AUTO: 43.7 % (ref 37–47)
HGB BLD-MCNC: 14.6 G/DL (ref 12–16)
IMM GRANULOCYTES # BLD AUTO: 0.02 K/UL (ref 0–0.11)
IMM GRANULOCYTES NFR BLD AUTO: 0.2 % (ref 0–0.9)
KETONES UR STRIP.AUTO-MCNC: ABNORMAL MG/DL
LEUKOCYTE ESTERASE UR QL STRIP.AUTO: NEGATIVE
LYMPHOCYTES # BLD AUTO: 4.5 K/UL (ref 1–4.8)
LYMPHOCYTES NFR BLD: 47.5 % (ref 22–41)
MAGNESIUM SERPL-MCNC: 2.2 MG/DL (ref 1.5–2.5)
MCH RBC QN AUTO: 24.7 PG (ref 27–33)
MCHC RBC AUTO-ENTMCNC: 33.4 G/DL (ref 33.6–35)
MCV RBC AUTO: 73.9 FL (ref 81.4–97.8)
METHADONE UR QL SCN: NEGATIVE
MICRO URNS: ABNORMAL
MONOCYTES # BLD AUTO: 0.89 K/UL (ref 0–0.85)
MONOCYTES NFR BLD AUTO: 9.4 % (ref 0–13.4)
NEUTROPHILS # BLD AUTO: 3.78 K/UL (ref 2–7.15)
NEUTROPHILS NFR BLD: 39.9 % (ref 44–72)
NITRITE UR QL STRIP.AUTO: NEGATIVE
NRBC # BLD AUTO: 0 K/UL
NRBC BLD AUTO-RTO: 0 /100 WBC
OPIATES UR QL SCN: NEGATIVE
OXYCODONE UR QL SCN: NEGATIVE
PCP UR QL SCN: NEGATIVE
PH UR STRIP.AUTO: 6 [PH]
PLATELET # BLD AUTO: 209 K/UL (ref 164–446)
PMV BLD AUTO: 10.9 FL (ref 9–12.9)
POTASSIUM SERPL-SCNC: 3.3 MMOL/L (ref 3.6–5.5)
POTASSIUM SERPL-SCNC: 4.1 MMOL/L (ref 3.6–5.5)
POTASSIUM SERPL-SCNC: 4.4 MMOL/L (ref 3.6–5.5)
PROPOXYPH UR QL SCN: NEGATIVE
PROT SERPL-MCNC: 6.2 G/DL (ref 6–8.2)
PROT UR QL STRIP: NEGATIVE MG/DL
RBC # BLD AUTO: 5.91 M/UL (ref 4.2–5.4)
RBC UR QL AUTO: NEGATIVE
SODIUM SERPL-SCNC: 134 MMOL/L (ref 135–145)
SODIUM SERPL-SCNC: 134 MMOL/L (ref 135–145)
SODIUM SERPL-SCNC: 137 MMOL/L (ref 135–145)
SP GR UR STRIP.AUTO: 1.03
UROBILINOGEN UR STRIP.AUTO-MCNC: 2 MG/DL
WBC # BLD AUTO: 9.5 K/UL (ref 4.8–10.8)

## 2017-12-04 PROCEDURE — 700105 HCHG RX REV CODE 258: Performed by: STUDENT IN AN ORGANIZED HEALTH CARE EDUCATION/TRAINING PROGRAM

## 2017-12-04 PROCEDURE — 80048 BASIC METABOLIC PNL TOTAL CA: CPT | Mod: 91

## 2017-12-04 PROCEDURE — 700102 HCHG RX REV CODE 250 W/ 637 OVERRIDE(OP): Performed by: STUDENT IN AN ORGANIZED HEALTH CARE EDUCATION/TRAINING PROGRAM

## 2017-12-04 PROCEDURE — 51798 US URINE CAPACITY MEASURE: CPT

## 2017-12-04 PROCEDURE — 80307 DRUG TEST PRSMV CHEM ANLYZR: CPT

## 2017-12-04 PROCEDURE — 85025 COMPLETE CBC W/AUTO DIFF WBC: CPT

## 2017-12-04 PROCEDURE — 83735 ASSAY OF MAGNESIUM: CPT

## 2017-12-04 PROCEDURE — 81003 URINALYSIS AUTO W/O SCOPE: CPT

## 2017-12-04 PROCEDURE — 82962 GLUCOSE BLOOD TEST: CPT | Mod: 91

## 2017-12-04 PROCEDURE — A9270 NON-COVERED ITEM OR SERVICE: HCPCS | Performed by: STUDENT IN AN ORGANIZED HEALTH CARE EDUCATION/TRAINING PROGRAM

## 2017-12-04 PROCEDURE — 83036 HEMOGLOBIN GLYCOSYLATED A1C: CPT

## 2017-12-04 PROCEDURE — 99232 SBSQ HOSP IP/OBS MODERATE 35: CPT | Mod: GC | Performed by: INTERNAL MEDICINE

## 2017-12-04 PROCEDURE — 80053 COMPREHEN METABOLIC PANEL: CPT

## 2017-12-04 PROCEDURE — 36415 COLL VENOUS BLD VENIPUNCTURE: CPT

## 2017-12-04 RX ORDER — POTASSIUM CHLORIDE 20 MEQ/1
20 TABLET, EXTENDED RELEASE ORAL ONCE
Status: COMPLETED | OUTPATIENT
Start: 2017-12-04 | End: 2017-12-04

## 2017-12-04 RX ORDER — POTASSIUM CHLORIDE 20 MEQ/1
40 TABLET, EXTENDED RELEASE ORAL ONCE
Status: COMPLETED | OUTPATIENT
Start: 2017-12-04 | End: 2017-12-04

## 2017-12-04 RX ADMIN — RIVAROXABAN 20 MG: 20 TABLET, FILM COATED ORAL at 17:52

## 2017-12-04 RX ADMIN — SODIUM CHLORIDE: 9 INJECTION, SOLUTION INTRAVENOUS at 08:25

## 2017-12-04 RX ADMIN — METOPROLOL TARTRATE 50 MG: 50 TABLET, FILM COATED ORAL at 08:25

## 2017-12-04 RX ADMIN — POTASSIUM CHLORIDE 20 MEQ: 1500 TABLET, EXTENDED RELEASE ORAL at 11:18

## 2017-12-04 RX ADMIN — NICOTINE 14 MG: 14 PATCH, EXTENDED RELEASE TRANSDERMAL at 06:06

## 2017-12-04 RX ADMIN — ACETAMINOPHEN 650 MG: 325 TABLET, FILM COATED ORAL at 00:05

## 2017-12-04 RX ADMIN — POTASSIUM CHLORIDE 40 MEQ: 1500 TABLET, EXTENDED RELEASE ORAL at 07:28

## 2017-12-04 ASSESSMENT — ENCOUNTER SYMPTOMS
DIARRHEA: 0
HEMOPTYSIS: 0
WHEEZING: 0
SHORTNESS OF BREATH: 0
NAUSEA: 0
BACK PAIN: 0
FALLS: 0
EYE PAIN: 0
DOUBLE VISION: 0
CLAUDICATION: 0
ABDOMINAL PAIN: 0
CHILLS: 0
PND: 0
SENSORY CHANGE: 0
FOCAL WEAKNESS: 0
COUGH: 0
SPEECH CHANGE: 0
CONSTIPATION: 0
SPUTUM PRODUCTION: 0
BLURRED VISION: 0
PALPITATIONS: 0
STRIDOR: 0
DIZZINESS: 0
WEAKNESS: 0
NECK PAIN: 0
VOMITING: 0
LOSS OF CONSCIOUSNESS: 0
SORE THROAT: 0
DEPRESSION: 1
FEVER: 0
HEADACHES: 0
MYALGIAS: 0
DIAPHORESIS: 0

## 2017-12-04 ASSESSMENT — PAIN SCALES - GENERAL
PAINLEVEL_OUTOF10: 3
PAINLEVEL_OUTOF10: 0

## 2017-12-04 ASSESSMENT — LIFESTYLE VARIABLES: SUBSTANCE_ABUSE: 0

## 2017-12-04 NOTE — PROGRESS NOTES
FSBS at 1115 was 214, insulin regimen instructs no insulin required unless blood sugar is greater than 250, clarified with Dr. Dejesus, received no new orders.

## 2017-12-04 NOTE — H&P
DATE OF SERVICE:  2017.    CO-SIGNER:  Martin Ellison MD    CHIEF COMPLAINT:  Hyperglycemia; generalized weakness.    HISTORY OF PRESENT ILLNESS:  In brief, this is a very pleasant 57-year-old   female with past medical history significant for diabetes mellitus type 2,   hypertension, DVT, unknown heart rhythm presents to the ER with complaints of   generalized weakness and not feeling well for the past few days.  Patient   reports that her sister  recently and since then has not been feeling well   and she describes the symptoms as that of generalized fatigue.  She denies   dizziness, blurriness of vision, change in urination, dysuria, shortness of   breath, cough, or recent infections, fevers, or chills.  Reports to be   compliant with the medication and insulin, but reports that her diet has not   been good and mostly consists of cocoa.  During the interview, she did remark   that she needs a refill and glucometer prescription.  Most recent PCP note   advised the patient to be on sliding scale insulin, but the patient reports   that she is not on sliding scale insulin because she did not get a   prescription from the physician and she was advised against using it by her   doctor.    In the ER, patient received a liter bolus of normal saline.    PAST MEDICAL HISTORY:  1.  Hypertension.  2.  Diabetes mellitus type 2.  3.  Deep vein thrombosis.    PAST SURGICAL HISTORY:  1.  Appendectomy.  2.  Cholecystectomy.  3.  .  4.  Hysterectomy.  5.  Laminotomy.    PHYSICAL EXAMINATION:  CONSTITUTIONAL:  Appears comfortable, not in distress.  HEENT:  Normocephalic, atraumatic.  Pupils are equal and reactive.  No scleral   icterus.  LUNGS:  Clear to auscultation bilaterally.  CARDIOVASCULAR:  Regular rate and rhythm.  No murmurs, rubs, or gallops.  ABDOMEN:  Soft, nontender.  Normal bowel sounds.  EXTREMITIES:  No clubbing, cyanosis, or edema.  NEUROLOGIC:  Alert and oriented x3.    LABORATORY DATA:   Notable for sodium of 116, potassium of 5.6, chloride of 79,   bicarb of 23, glucose of 1085, creatinine of 2.03 and alkaline phosphatase of   140.    IMPRESSION:  This is a 57-year-old female with a history of diabetes mellitus   type 2 here for hyperglycemia.  Her bicarbonate is normal and we do not   suspect diabetic ketoacidosis at this point.  She does appear to be   noncompliant with medication given questionable history and is certainly not   compliant with the diet, which could have contributed to the hyperglycemia.    This patient will need a diabetic education prior to discharge once blood   sugars are under control.    ASSESSMENT:  1.  Hyperglycemia secondary to diabetes mellitus type 2.  2.  Pseudohyponatremia, corrected sodium of 132.  3.  Acute on chronic kidney failure  4.  Hypertension.  5.  Deep vein thrombosis.  6.  Tobacco use.  7.  Hypertriglyceridemia    PLAN:  1.  We will continue home dose of insulin Glargine at 80 units once daily.  2.  We will place patient on sliding scale insulin.  3.  A liter bolus was given in the ER, another bolus will be given and we will   place patient on maintenance IV fluids at 150 mL per hour.  4.  Continue home medications for hypertension and DVT except for lisinopril,   which we will hold due to the presence of acute kidney injury.  5.  Counseling for tobacco use has been given to the patient.  6.  Based on fasting lipid, fenofibrate may need to be started.      Core measures have been addressed as appropriate, please refer to entrance H   and P for more details.       ____________________________________     MD LAURA Kohli / MILLIE    DD:  12/03/2017 19:25:37  DT:  12/03/2017 22:32:34    D#:  [id number]  Job#:  [id number]

## 2017-12-04 NOTE — ED PROVIDER NOTES
ED Provider Note    CHIEF COMPLAINT  Chief Complaint   Patient presents with   • Hypotension     86/55 on arrival   • High Blood Sugar     reads HI on glucometer. discharged yesterday - increased urone/ dry mucus membranes.    • Weakness     pt lethargic a&ox4       HPI  Vin Maciel is a 57 y.o. female who presents For evaluation of generalized weakness and suspicion of hyperglycemia, she is a diabetic who reports that she is not very compliant with her medications and she never checks her sugar. She also reports poor dietary choices. She has nausea but has not been vomiting, she denies any fever, she's had minimal generalized abdominal pain. She offers no other specific complaints at this time and is a poor historian which limits the accuracy of the history.    REVIEW OF SYSTEMS  Negative for fever, rash, chest pain, dyspnea, headache, focal weakness, focal numbness, focal tingling, back pain. All other systems are negative.     PAST MEDICAL HISTORY  Past Medical History:   Diagnosis Date   • DM (diabetes mellitus) (CMS-HCC)    • HTN (hypertension)        FAMILY HISTORY  No family history on file.    SOCIAL HISTORY  Social History   Substance Use Topics   • Smoking status: Current Every Day Smoker     Packs/day: 1.00   • Smokeless tobacco: Never Used   • Alcohol use No       SURGICAL HISTORY  Past Surgical History:   Procedure Laterality Date   • APPENDECTOMY     • CHOLECYSTECTOMY     • HCHG  DELIVERY SER     • HYSTERECTOMY, TOTAL ABDOMINAL     • LAMINOTOMY     • OTHER      back, neck, shoulder surgeries   • OTHER ORTHOPEDIC SURGERY         CURRENT MEDICATIONS  I personally reviewed the medication list in the charting documentation.     ALLERGIES  Allergies   Allergen Reactions   • Sulfa Drugs Nausea     N/V nervousness       MEDICAL RECORD  I have reviewed patient's medical record and pertinent results are listed above.      PHYSICAL EXAM  VITAL SIGNS: BP (!) 86/55   Pulse 86   Temp 36.6 °C (97.8 °F)   " Resp (!) 10   Ht 1.702 m (5' 7\")   Wt 80 kg (176 lb 5.9 oz)   SpO2 99%   BMI 27.62 kg/m²    Constitutional: Well appearing patient in no acute distress.  Not toxic, nor ill in appearance.  HENT: Mucus membranes moist.    Eyes: No scleral icterus. Normal conjunctiva   Neck: Supple, comfortable, nonpainful range of motion.   Cardiovascular: Regular heart rate and rhythm.   Thorax & Lungs: Chest is nontender.  Lungs are clear to auscultation with good air movement bilaterally.  No wheeze, rhonchi, nor rales.   Abdomen: Soft, minimal generalized tenderness but no rebound, no guarding  Skin: Warm, dry. No rash appreciated  Extremities/Musculoskeletal: No sign of trauma. No asymmetric calf tenderness, erythema or edema. Normal range of motion   Neurologic: Alert & oriented. No focal deficits observed.   Psychiatric: Normal affect appropriate for the clinical situation.    DIAGNOSTIC STUDIES / PROCEDURES    LABS  Results for orders placed or performed during the hospital encounter of 12/03/17   CBC WITH DIFFERENTIAL   Result Value Ref Range    WBC 8.0 4.8 - 10.8 K/uL    RBC 5.94 (H) 4.20 - 5.40 M/uL    Hemoglobin 14.7 12.0 - 16.0 g/dL    Hematocrit 45.9 37.0 - 47.0 %    MCV 77.3 (L) 81.4 - 97.8 fL    MCH 24.7 (L) 27.0 - 33.0 pg    MCHC 32.0 (L) 33.6 - 35.0 g/dL    RDW 39.8 35.9 - 50.0 fL    Platelet Count 198 164 - 446 K/uL    MPV 11.2 9.0 - 12.9 fL    Neutrophils-Polys 57.80 44.00 - 72.00 %    Lymphocytes 31.50 22.00 - 41.00 %    Monocytes 9.30 0.00 - 13.40 %    Eosinophils 0.60 0.00 - 6.90 %    Basophils 0.70 0.00 - 1.80 %    Immature Granulocytes 0.10 0.00 - 0.90 %    Nucleated RBC 0.00 /100 WBC    Neutrophils (Absolute) 4.63 2.00 - 7.15 K/uL    Lymphs (Absolute) 2.53 1.00 - 4.80 K/uL    Monos (Absolute) 0.75 0.00 - 0.85 K/uL    Eos (Absolute) 0.05 0.00 - 0.51 K/uL    Baso (Absolute) 0.06 0.00 - 0.12 K/uL    Immature Granulocytes (abs) 0.01 0.00 - 0.11 K/uL    NRBC (Absolute) 0.00 K/uL   COMP METABOLIC PANEL "   Result Value Ref Range    Sodium 116 (LL) 135 - 145 mmol/L    Potassium 5.6 (H) 3.6 - 5.5 mmol/L    Chloride 79 (L) 96 - 112 mmol/L    Co2 23 20 - 33 mmol/L    Anion Gap 14.0 (H) 0.0 - 11.9    Glucose 1085 (HH) 65 - 99 mg/dL    Bun 36 (H) 8 - 22 mg/dL    Creatinine 2.03 (H) 0.50 - 1.40 mg/dL    Calcium 9.3 8.5 - 10.5 mg/dL    AST(SGOT) 21 12 - 45 U/L    ALT(SGPT) 16 2 - 50 U/L    Alkaline Phosphatase 140 (H) 30 - 99 U/L    Total Bilirubin 2.0 (H) 0.1 - 1.5 mg/dL    Albumin 4.0 3.2 - 4.9 g/dL    Total Protein 7.3 6.0 - 8.2 g/dL    Globulin 3.3 1.9 - 3.5 g/dL    A-G Ratio 1.2 g/dL   Troponin   Result Value Ref Range    Troponin I <0.01 0.00 - 0.04 ng/mL   ESTIMATED GFR   Result Value Ref Range    GFR If African American 30 (A) >60 mL/min/1.73 m 2    GFR If Non African American 25 (A) >60 mL/min/1.73 m 2     COURSE & MEDICAL DECISION MAKING  I have reviewed any medical record information, laboratory studies and radiographic results as noted above.  Differential diagnoses includes: Hyperglycemia, DKA, dehydration, electrolyte abnormalities, anemia    Encounter Summary: This is a 57 y.o. female with generalized weakness and concerns over possible high glucose, she is a poorly controlled diabetic who reports being noncompliant with her medication as well as dietary recommendations. The patient arrives somewhat hypotensive but generally not acutely ill-appearing, initial bedside glucometer reading was too high for the glucometer to result. After a liter normal saline patient's blood pressure has normalized. Her glucose on blood work is greater than 1000 but no evidence of significant metabolic derangement to suggest diabetic ketoacidosis. She has some renal insufficiency with I suspect is likely secondary to dehydration. She's been treated with IV fluids here in the emergency department or be admitted to the hospital for further evaluation      DISPOSITION: Admitted in guarded condition      FINAL IMPRESSION  1.  Hyperglycemia    2. Weakness generalized           This dictation was created using voice recognition software. The accuracy of the dictation is limited to the abilities of the software. I expect there may be some errors of grammar and possibly content. The nursing notes were reviewed and certain aspects of this information were incorporated into this note.    Electronically signed by: Cedrick Araujo, 12/3/2017 6:35 PM

## 2017-12-04 NOTE — NON-PROVIDER
"       Internal Medicine Medical Student Note  Note Author: Leeroy Omer, Student    Name Vin Maciel       1960   Age/Sex 57 y.o. female   MRN [id number]   Code Status Full code     After 5PM or if no immediate response to page, please call for cross-coverage  Attending/Team: Dr. Ellison/ Kayden See Patient List for primary contact information  Call [phone] to page after hours   1st Call - Day Intern (R1):   Dr. Dejesus 2nd Call - Day Sr. Resident (R2/R3):   Dr. Marcus     Reason for interval visit  (Principal Problem)   <principal problem not specified>    Interval Problem Daily Status Update  (24 hours)   Patient reports no acute overnight events. She states that she if feeling better today. Her blood glucose has dropped from 1085 in the ER to 165 today. She also reports that she is unable to urinate this morning.  Patient reports no chest pain, palpitations, or shortness of breath.     Review of Systems   Constitutional: Negative for chills, diaphoresis and fever.   HENT: Negative for congestion and sore throat.    Respiratory: Negative for cough, hemoptysis, sputum production, shortness of breath and wheezing.    Cardiovascular: Negative for chest pain, palpitations, claudication, leg swelling and PND.   Gastrointestinal: Negative for abdominal pain, constipation, diarrhea, nausea and vomiting.   Musculoskeletal: Negative for back pain, joint pain, myalgias and neck pain.   Neurological: Negative for dizziness, sensory change, speech change, focal weakness, weakness and headaches.   Psychiatric/Behavioral: Positive for depression.     Physical Exam       Vitals:    17 2300 17 0400 17 0800 17 1135   BP:  (!) 93/63 115/57 118/76   Pulse:  74 79 84   Resp:  16 17 18   Temp:  36.4 °C (97.6 °F) 36.7 °C (98 °F) 36.6 °C (97.8 °F)   SpO2:  94% 90% 98%   Weight: 75.8 kg (167 lb)      Height: 1.575 m (5' 2\")        Body mass index is 30.54 kg/m². Weight: 75.8 kg (167 lb)  Oxygen " Therapy:  Pulse Oximetry: 98 %, O2 (LPM): 0, O2 Delivery: None (Room Air)    Physical Exam   Constitutional: She is oriented to person, place, and time and well-developed, well-nourished, and in no distress.   HENT:   Head: Normocephalic and atraumatic.   Eyes: Conjunctivae and EOM are normal.   Neck: Normal range of motion. Neck supple.   Cardiovascular: Normal rate, regular rhythm and normal heart sounds.  Exam reveals no gallop and no friction rub.    No murmur heard.  Pulmonary/Chest: Effort normal and breath sounds normal. No respiratory distress. She has no wheezes. She has no rales. She exhibits no tenderness.   Abdominal: Soft. Bowel sounds are normal. She exhibits no distension. There is no tenderness. There is no rebound and no guarding.   Musculoskeletal: She exhibits no edema.   Neurological: She is alert and oriented to person, place, and time. No cranial nerve deficit. Coordination normal.   Skin: Skin is warm and dry.       Most Recent Labs:    Lab Results   Component Value Date/Time    WBC 9.5 12/04/2017 02:20 AM    RBC 5.91 (H) 12/04/2017 02:20 AM    HEMOGLOBIN 14.6 12/04/2017 02:20 AM    HEMATOCRIT 43.7 12/04/2017 02:20 AM    MCV 73.9 (L) 12/04/2017 02:20 AM    MCH 24.7 (L) 12/04/2017 02:20 AM    MCHC 33.4 (L) 12/04/2017 02:20 AM    MPV 10.9 12/04/2017 02:20 AM    NEUTSPOLYS 39.90 (L) 12/04/2017 02:20 AM    LYMPHOCYTES 47.50 (H) 12/04/2017 02:20 AM    MONOCYTES 9.40 12/04/2017 02:20 AM    EOSINOPHILS 2.30 12/04/2017 02:20 AM    BASOPHILS 0.70 12/04/2017 02:20 AM    HYPOCHROMIA 1+ 09/27/2013 04:45 AM      Lab Results   Component Value Date/Time    SODIUM 134 (L) 12/04/2017 02:20 AM    POTASSIUM 3.3 (L) 12/04/2017 02:20 AM    CHLORIDE 97 12/04/2017 02:20 AM    CO2 29 12/04/2017 02:20 AM    GLUCOSE 165 (H) 12/04/2017 02:20 AM    BUN 29 (H) 12/04/2017 02:20 AM    CREATININE 1.43 (H) 12/04/2017 02:20 AM      Lab Results   Component Value Date/Time    ALTSGPT 16 12/04/2017 02:20 AM    ASTSGOT 16  12/04/2017 02:20 AM    ALKPHOSPHAT 115 (H) 12/04/2017 02:20 AM    TBILIRUBIN 1.5 12/04/2017 02:20 AM    LIPASE 56 04/26/2013 10:05 PM    ALBUMIN 3.4 12/04/2017 02:20 AM    GLOBULIN 2.8 12/04/2017 02:20 AM    INR 2.22 (H) 08/04/2017 03:14 PM     Lab Results   Component Value Date/Time    PROTHROMBTM 25.3 (H) 08/04/2017 03:14 PM    INR 2.22 (H) 08/04/2017 03:14 PM      Assessment/Plan     #Hyperglycemia  -History of DM2  -Blood glusose 1085 in ER; now 165  -Serum osmolality - 324; calculated osmolality - 305  -HA1c - 15.6  -Continue maintenance NS at 100  -Continue home Lantus 80 units plus lispro SSI  -BMP q6 hours    #Acute on chronic kidney injury  -Baseline Cr appears to be ~1.1, was 2 at presentation  -Cr currently 1.43, improving  -Likely secondary to dehydration from hyperosmolar syndrome    #HTN  -Continue home metoprolol   -Currently holding home lisinopril and spironolactone due to BETTY    #Hyperlipidemia  -Continue home simvastatin    #History of DVT  -Occurred in 8/2017  -Unsure if DVT was provoked or unprovoked   -Continue home Xarelto    #Major depressive disorder  -Continue home duloxeine

## 2017-12-04 NOTE — PROGRESS NOTES
2 RN skin check done with second RN Mindi. Pt skin intact with the exception of redness to bilateral feet heels and sacrum but blanching. Picture of sacrum uploaded to EPIC.

## 2017-12-04 NOTE — PROGRESS NOTES
Unable to obtain urine sample, patient had flushed urine despite clear instructions to leave hat in toilet to collect urine sample, education reinforced.

## 2017-12-04 NOTE — ED NOTES
Chief Complaint   Patient presents with   • Hypotension     86/55 on arrival   • High Blood Sugar     reads HI on glucometer. discharged yesterday

## 2017-12-04 NOTE — ED NOTES
Chief Complaint   Patient presents with   • Hypotension     86/55 on arrival   • High Blood Sugar     reads HI on glucometer. discharged yesterday - increased urone/ dry mucus membranes.    • Weakness     pt lethargic a&ox4

## 2017-12-04 NOTE — PROGRESS NOTES
"Patient found walking in xie to back nursing station, patient stated \"I don't know where I am\", safely walked patient back to room, re-oriented patient, placed bed alarm, educated need for bed alarm due to safety risk, patient verbalized understanding. Patient had removed IV, patient explained \"It just fell out\", educated patient on indication, patient declining IV at this time. Escalated to MD, Dr. Dejesus came to bedside, patient's concerns expressed, patient agreeable to IV placement after dinner, education reinforced.  "

## 2017-12-04 NOTE — ED NOTES
Med rec updated and complete.  Allergies reviewed.  Pt is currently taking an antibiotic.  Pt is unaware at this time as to why she is taking   It.  Pt stated that she took all her morning medications.

## 2017-12-04 NOTE — ED NOTES
Patient resting comfortably; VSS at this time; denies needs at this time; updated on plan of care.

## 2017-12-04 NOTE — H&P
Internal Medicine Admitting History and Physical    Note Author: Bishop Kang M.D.       Name Vin Maciel       1960   Age/Sex 57 y.o. female   MRN [id number]   Code Status FULL     After 5PM or if no immediate response to page, please call for cross-coverage  Attending/Team: Terra  See Patient List for primary contact information  Call [phone] to page    1st Call - Day Intern (R1):   Anjelica 2nd Call - Day Sr. Resident (R2/R3):   Amrit       Chief Complaint:  Chief Complaint   Patient presents with   • Hypotension     86/55 on arrival   • High Blood Sugar     reads HI on glucometer. discharged yesterday - increased urone/ dry mucus membranes.    • Weakness     pt lethargic a&ox4       HPI:   Pt is a 56 yo F presenting with feelings of depression. Pt has a PMH of DM2 (poor control due to noncompliance, last HA1c was 11.7 but this was in ), DJD, MDD, PMH of DVT (unclear etiology, occurred ~/July in , on Xarelto), HTN/HLD, and CKD stage III (baseline Cr ~1.1). Pt is a poor historian so ROS and Hx may be limited as a result. Pt denies any concerns or complaints aside from depression. Her sister just passed away and was apparently her only remaining relative. When prompted she states she has felt somewhat weak recently and has been having polydipsia which resulted in her drinking lots of juice. She admits to poor dietary compliance for her DM2 especially after her sisters death but states she is compliant with her insulin regimen and other medications. She denies active SI/HI at this time. Pt states it was her depression that prompted her to come to the ED however not any other symptoms.    In the ED CBC/CMP was performed which showed the pt to have markedly elevated sugars at over 1000. Pt was also hyponatremic, hypochloremic, and hyperkalemic however when adjusted for her BG these were found to be wnl or near so. BETTY on CKD was also noted. Pt was subsequently admitted for  treatment of her hyperglycemia and BETTY.     Review of Systems   Constitutional: Negative for chills, fever and malaise/fatigue.   HENT: Negative for congestion and sore throat.    Eyes: Negative for blurred vision.   Respiratory: Negative for cough and shortness of breath.    Cardiovascular: Negative for chest pain and leg swelling.   Gastrointestinal: Negative for abdominal pain, constipation, diarrhea, nausea and vomiting.   Genitourinary: Negative for dysuria and frequency.   Musculoskeletal: Negative for myalgias.   Skin: Negative for rash.   Neurological: Positive for weakness. Negative for sensory change, speech change and focal weakness.   Endo/Heme/Allergies: Positive for polydipsia.   Psychiatric/Behavioral: Positive for depression. Negative for suicidal ideas.             Past Medical History:   Past Medical History:   Diagnosis Date   • DM (diabetes mellitus) (CMS-HCC)    • HTN (hypertension)        Past Surgical History:  Past Surgical History:   Procedure Laterality Date   • APPENDECTOMY     • CHOLECYSTECTOMY     • HCHG  DELIVERY SER     • HYSTERECTOMY, TOTAL ABDOMINAL     • LAMINOTOMY     • OTHER      back, neck, shoulder surgeries   • OTHER ORTHOPEDIC SURGERY         Current Outpatient Medications:  Home Medications    **Home medications have not yet been reviewed for this encounter**         Medication Allergy/Sensitivities:  Allergies   Allergen Reactions   • Sulfa Drugs Nausea     N/V nervousness         Family History:  Family History   Problem Relation Age of Onset   • Diabetes Mother    • Heart Disease Father    • Cancer Father    • Diabetes Sister        Social History:  Social History     Social History   • Marital status:      Spouse name: N/A   • Number of children: N/A   • Years of education: N/A     Occupational History   • Not on file.     Social History Main Topics   • Smoking status: Current Every Day Smoker     Packs/day: 1.00   • Smokeless tobacco: Never Used   •  "Alcohol use No   • Drug use: No   • Sexual activity: Not on file     Other Topics Concern   • Not on file     Social History Narrative   • No narrative on file     PCP : Tiago Higgins M.D.      Physical Exam     Vitals:    12/03/17 1730 12/03/17 1745 12/03/17 1815 12/03/17 1830   BP:       Pulse: 86 84 85 64   Resp: (!) 10 (!) 9 (!) 11 (!) 11   Temp:       SpO2: 99% 98% 99% 97%   Weight:       Height:         Body mass index is 27.62 kg/m².  BP (!) 86/55   Pulse 64   Temp 36.6 °C (97.8 °F)   Resp (!) 11   Ht 1.702 m (5' 7\")   Wt 80 kg (176 lb 5.9 oz)   SpO2 97%   BMI 27.62 kg/m²   O2 therapy: Pulse Oximetry: 97 %, O2 (LPM): 2, O2 Delivery: None (Room Air)    Physical Exam   Constitutional: She appears dehydrated. She appears unhealthy.   HENT:   Head: Normocephalic and atraumatic.   Eyes: Pupils are equal, round, and reactive to light.   Neck: No JVD present.   Cardiovascular: Normal rate and regular rhythm.    No murmur heard.  Pulmonary/Chest: Breath sounds normal.   Abdominal: Soft. She exhibits no distension. There is no tenderness.   Musculoskeletal: She exhibits no edema or deformity.   Neurological: She is alert. GCS score is 15.   Skin: Skin is warm and dry.             Data Review       Old Records Request:   Completed  Current Records review and summary: Completed    Lab Data Review:  Recent Results (from the past 24 hour(s))   COMP METABOLIC PANEL    Collection Time: 12/03/17  4:30 PM   Result Value Ref Range    Sodium 116 (LL) 135 - 145 mmol/L    Potassium 5.6 (H) 3.6 - 5.5 mmol/L    Chloride 79 (L) 96 - 112 mmol/L    Co2 23 20 - 33 mmol/L    Anion Gap 14.0 (H) 0.0 - 11.9    Glucose 1085 (HH) 65 - 99 mg/dL    Bun 36 (H) 8 - 22 mg/dL    Creatinine 2.03 (H) 0.50 - 1.40 mg/dL    Calcium 9.3 8.5 - 10.5 mg/dL    AST(SGOT) 21 12 - 45 U/L    ALT(SGPT) 16 2 - 50 U/L    Alkaline Phosphatase 140 (H) 30 - 99 U/L    Total Bilirubin 2.0 (H) 0.1 - 1.5 mg/dL    Albumin 4.0 3.2 - 4.9 g/dL    Total Protein 7.3 " 6.0 - 8.2 g/dL    Globulin 3.3 1.9 - 3.5 g/dL    A-G Ratio 1.2 g/dL   Troponin    Collection Time: 12/03/17  4:30 PM   Result Value Ref Range    Troponin I <0.01 0.00 - 0.04 ng/mL   ESTIMATED GFR    Collection Time: 12/03/17  4:30 PM   Result Value Ref Range    GFR If African American 30 (A) >60 mL/min/1.73 m 2    GFR If Non African American 25 (A) >60 mL/min/1.73 m 2   CBC WITH DIFFERENTIAL    Collection Time: 12/03/17  5:30 PM   Result Value Ref Range    WBC 8.0 4.8 - 10.8 K/uL    RBC 5.94 (H) 4.20 - 5.40 M/uL    Hemoglobin 14.7 12.0 - 16.0 g/dL    Hematocrit 45.9 37.0 - 47.0 %    MCV 77.3 (L) 81.4 - 97.8 fL    MCH 24.7 (L) 27.0 - 33.0 pg    MCHC 32.0 (L) 33.6 - 35.0 g/dL    RDW 39.8 35.9 - 50.0 fL    Platelet Count 198 164 - 446 K/uL    MPV 11.2 9.0 - 12.9 fL    Neutrophils-Polys 57.80 44.00 - 72.00 %    Lymphocytes 31.50 22.00 - 41.00 %    Monocytes 9.30 0.00 - 13.40 %    Eosinophils 0.60 0.00 - 6.90 %    Basophils 0.70 0.00 - 1.80 %    Immature Granulocytes 0.10 0.00 - 0.90 %    Nucleated RBC 0.00 /100 WBC    Neutrophils (Absolute) 4.63 2.00 - 7.15 K/uL    Lymphs (Absolute) 2.53 1.00 - 4.80 K/uL    Monos (Absolute) 0.75 0.00 - 0.85 K/uL    Eos (Absolute) 0.05 0.00 - 0.51 K/uL    Baso (Absolute) 0.06 0.00 - 0.12 K/uL    Immature Granulocytes (abs) 0.01 0.00 - 0.11 K/uL    NRBC (Absolute) 0.00 K/uL       Imaging/Procedures Review:    ndependant Imaging Review: Completed  No orders to display            EKG:   No EKG       Assessment/Plan     #Hyperglycemia/DM2  -Presented with BG of 1085, various other lyte abnormalities that when adjusted for BG are wnl or nearly so  -Bicarb wnl, slight gap at 14, do not suspect DKA at this time  -Pt denies any symptoms of infxn, essentially entirely asymptomatic and presented due to depression rather than hyperglycemia, 0/4 SIRS and 0/3 qSOFA   -Appears to be hyperosmolar syndrome 2/2 dietary noncompliance, pt states she has been drinking lots of juice and not taking care of  herself since her sister , and likely medical noncompliance although pt denies   -Most recent HA1c was elevated at 11.7 however that was 4 years ago  -Repeat HA1c pending  -Given 10 units lispro on arrival to floor  -Received 2L NS boluses and started on maintenance NS at 150    -Restart home Lantus 80 units plus lispro SSI  -Hypoglycemia protocol and POC glucose checks  -Repeat BMP at midnight    #BETTY on CKD stage III  -Baseline Cr appears to be ~1.1, currently 2  -UA pending  -Likely prerenal 2/2 dehydration resulting from hyperosmolar syndrome  -Received 2L NS boluses and started on maintenance NS at 150    -Repeat BMP at midnight    #HTN/HLD  -Holding home lisinopril and spironolactone due to BETTY  -Continue metoprolol and Zocor     #DJD  -Continue home flexeril   -Tylenol PRN for pain    #DVT  -Previous ED notes per pt that this occurred ~2017 at [facility]   -Appeared in ED on 2017 with LE swelling and LE duplex was negative at that time  -Unclear etiology of DVT, provoked/unprovoked, or plan for duration of therapy as was at OSH  -Continue home Xarelto for now    #MDD  -Started on home Cymbalta    #PPX  -Xarelto for DVT ppx  -Bowel regimen      Anticipated Hospital stay:  >2 midnights        Quality Measures    Reviewed items::  Labs reviewed and Medications reviewed  Walls catheter::  No Walls  DVT: Xarelto.

## 2017-12-04 NOTE — ED NOTES
Admitting team at bedside for admit    Pt resting comfortably.   Call light within reach  Personal belongings in reach  Bathroom and comfort needs met  VSS

## 2017-12-05 NOTE — PROGRESS NOTES
"Received call at 2030 that pt had left AMA. Pt was not willing to wait for doctor to be contacted and left without signing form. Contacted pt using home phone number, asked why she left and she stated that it was due to her room \"being covered in hair\". Asked her to further explain and she stated that she felt the room was unclean due to large amounts of hair clippings on the floor. Pt left without meds, asked if she would be willing to  prescriptions, she was skeptical stating she does not believe she is missing any meds although she stated she couldn't currently find them. I reminded pt that she left without receiving any of her new hospital meds given that she left AMA. Pt asked if she would have to pay for new prescriptions, I stated that she would likely have to pay as much as she would for any previous prescription. She remained skeptical and asked if she could call me tomorrow, 12/5/17, to discuss further. I stated I would not be available until the night shift and that it would be more appropriate for her to call her PCP if she was planning on calling during work hours. She agreed and stated she would likely call her PCP tomorrow if she believes she is missing any of her meds.   "

## 2017-12-05 NOTE — DISCHARGE SUMMARY
Internal Medicine Discharge Summary  Note Author: Jagdeep Marcus M.D.       Admit Date:  12/3/2017       Discharge Date:   12/4/2017    Service:   [facility] Internal Medicine [facility]  Attending Physician(s):   Dr. Terra SCHMITT     Senior Resident(s):   Dr. Amrit MD  Jya Resident(s):   Dr. Rob SCHMITT      Primary Diagnosis:     Acute hyperglycemia due to poorly controlled diabetes mellitus    Secondary Diagnoses:                Principal Problem:    Hyperglycemia POA: Unknown  Active Problems:    Hyperosmolarity due to secondary diabetes mellitus (CMS-HCC) POA: Unknown    Acute kidney injury superimposed on chronic kidney disease (CMS-HCC) POA: Unknown    Hypertriglyceridemia POA: Unknown  Resolved Problems:    * No resolved hospital problems. *      Hospital Summary (Brief Narrative):           57 year old lady with type II diabetes mellitus on insulin and oral medications, hypertension, DVT on anticoagulation, CKD stage III , admitted here due to hyperglycemia. The patient was having fatigue, tiredness and generalized weakness for the last few days before admission. She has no fever no nausea or vomiting, no other symptoms. For her diabetes, she is on metformin 500 mg twice a day, glipizide 2.5 mg once daily and insulin glargine 80 units at evening and she is supposed to be on insulin sliding scale per PCP note but the patient did not start this regimen. She seems not compliant with her medications and diet. Also She said that she is being depressed recently due to death of her sister, but denies any suicidal or homicidal ideation and does not seem to have depression features on assessment.    She was hemodynamically stable on admission, vitals are within normal limits and she was not in acute distress on admission. Her blood sugar was 1085 mg/dl, bicarb 23, anion gap 14, sodium 116 and when corrected with hyperglycemia was in 132, potassium 5.6 hyperkalemia, and creatinine 2.03, and baseline  creatinine is about 1.2-1.4, BUN on admission was 36 , GFR 25%, chloride 79, bilirubin 2, Phosphatase 140, serum osmolarity 324, hemoglobin A1c 15.6. Urine Drug screen was negative and troponin was negative.     We managed her hyperglycemia with restarting her insulin glargine 80 units at evening, insulin sliding scale and frequent blood sugar check. For her acute kidney injury, we thought this was prerenal cause and we managed her with IV fluid. Her blood sugar decreased to 165-250, creatinine returned to back to baseline, 1.04, potassium decreased from 5.6 to normal limits, around 4.      Unfortunately the patient left AMA on 12/4, at evening for non clear reason without getting prescription for outpatient medications. Please see Dr. Bishop Kang note at that day.        Patient /Hospital Summary (Details -- Problem Oriented) :          # Hyperglycemia due to diabetes mellitus  See  brief Hospital summary above     # Acute kidney injury on CKD  Likely prerenal due to dehydration from her hyperosmolar status. Managed with IV fluid. see Hospital summary above    # Hypertension   Blood pressure was within normal limits. We stopped her lisinopril and spironolactone due to acute kidney injury concern.    # depression   Continued home Cymbalta. Needs follow-up as outpatient    # DVT  we continued her Xarelto while inpatient    constultant       None    Procedures:        None    Imaging/ Testing:  None      Discharge Medications:         patient left AMA       Disposition:   Left AMA      Primary Care Provider:    Discharge summary faxed to primary care provider:  Deferred  Copy of discharge summary given to the patient: Deferred      Follow up appointment details :      Left AMA but instructed to follow her PCP    Pending Studies:        None    Time spent on discharge day patient visit, preparing discharge paperwork and arranging for patient follow up.    Summary of follow up issues:   Left AMA but instructed to  follow her PCP    Discharge Time (Minutes) : 40 minutes  Hospital Course Type:  Inpatient Stay >2 midnights        Vitals:    12/04/17 0800 12/04/17 1135 12/04/17 1600 12/04/17 1850   BP: 115/57 118/76 106/62 118/83   Pulse: 79 84 77 85   Resp: 17 18 16 16   Temp: 36.7 °C (98 °F) 36.6 °C (97.8 °F) 36.9 °C (98.5 °F) 36.8 °C (98.2 °F)   SpO2: 90% 98% 90% 94%   Weight:       Height:         Weight/BMI: Body mass index is 30.54 kg/m².  Pulse Oximetry: 94 %, O2 (LPM): 0, O2 Delivery: None (Room Air)        Most Recent Labs:    Lab Results   Component Value Date/Time    WBC 9.5 12/04/2017 02:20 AM    RBC 5.91 (H) 12/04/2017 02:20 AM    HEMOGLOBIN 14.6 12/04/2017 02:20 AM    HEMATOCRIT 43.7 12/04/2017 02:20 AM    MCV 73.9 (L) 12/04/2017 02:20 AM    MCH 24.7 (L) 12/04/2017 02:20 AM    MCHC 33.4 (L) 12/04/2017 02:20 AM    MPV 10.9 12/04/2017 02:20 AM    NEUTSPOLYS 39.90 (L) 12/04/2017 02:20 AM    LYMPHOCYTES 47.50 (H) 12/04/2017 02:20 AM    MONOCYTES 9.40 12/04/2017 02:20 AM    EOSINOPHILS 2.30 12/04/2017 02:20 AM    BASOPHILS 0.70 12/04/2017 02:20 AM    HYPOCHROMIA 1+ 09/27/2013 04:45 AM      Lab Results   Component Value Date/Time    SODIUM 134 (L) 12/04/2017 05:57 PM    POTASSIUM 4.1 12/04/2017 05:57 PM    CHLORIDE 98 12/04/2017 05:57 PM    CO2 26 12/04/2017 05:57 PM    GLUCOSE 286 (H) 12/04/2017 05:57 PM    BUN 26 (H) 12/04/2017 05:57 PM    CREATININE 1.04 12/04/2017 05:57 PM      Lab Results   Component Value Date/Time    ALTSGPT 16 12/04/2017 02:20 AM    ASTSGOT 16 12/04/2017 02:20 AM    ALKPHOSPHAT 115 (H) 12/04/2017 02:20 AM    TBILIRUBIN 1.5 12/04/2017 02:20 AM    LIPASE 56 04/26/2013 10:05 PM    ALBUMIN 3.4 12/04/2017 02:20 AM    GLOBULIN 2.8 12/04/2017 02:20 AM    INR 2.22 (H) 08/04/2017 03:14 PM     Lab Results   Component Value Date/Time    PROTHROMBTM 25.3 (H) 08/04/2017 03:14 PM    INR 2.22 (H) 08/04/2017 03:14 PM

## 2017-12-05 NOTE — CARE PLAN
"Problem: Discharge Barriers/Planning  Goal: Patient's continuum of care needs will be met    Intervention: Assess potential discharge barriers on admission and throughout hospital stay  Patient asking when she wants to go home, explained to patient, her current acute kidney injury and monitoring blood sugars, patient verbalized understanding. Attempted IV access, unsuccessful, patient declined to re-attempt, patient request to re-attempt in one hour, will communicate night shift RN. Patient understands the importance of IV fluids however would like to re-attempt at IV in one hour.      Problem: Psychosocial Needs:  Goal: Level of anxiety will decrease    Intervention: Identify and develop with patient strategies to cope with anxiety triggers  Patient stated \"I've been feeling depressed because my brother had passed away a month ago\", identified support team, patient states she has a friend who she calls her sister to help her through it. Assisted patient in identifying coping mechanisms, patient states she is [employer] and praying helps her. Discussed spiritual requests are available for her, patient eager to speak with a  tomorrow, will set up tomorrow.         "

## 2017-12-05 NOTE — PROGRESS NOTES
Internal Medicine Interval Note    Name Vin Maciel       1960   Age/Sex 57 y.o. female   MRN [id number]   Code Status full     After 5PM or if no immediate response to page, please call for cross-coverage  Attending/Team: Dr. Ellison / [facility] team See Patient List for primary contact information  Call [phone] to page    1st Call - Day Intern (R1):   Dr. Dejesus 2nd Call - Day Sr. Resident (R2/R3):   Dr. Marcus         Reason for interval visit  (Principal Problem)   Hyperglycemia    Interval Problem Daily Status Update  (24 hours)   Pt often appears confused, but likely at her baseline, as it appears to be more of a personality trait, rather than an acute disorder.   Likely unrelated to her HOHG.    Patient required frequent reorientation to location in hospital.  ...  Nursing concerned by pt stating she did not know where she was..... When later talking with pt, she appeared to be more concerned about her location within the hospital (i.e., which building, and near which street)....  Again, this appears more of an odd-behavior that may be reflective of her personality (or possibly an underlying psychiatric issue) rather than an acute change or due to memory.    States she is depressed (multiple past deaths of family members), but no SI.   Denies alcohol or drug use.       Review of Systems   Constitutional: Negative for chills and fever.   HENT: Negative for sore throat.    Eyes: Negative for blurred vision, double vision and pain.   Respiratory: Negative for cough, shortness of breath and stridor.    Cardiovascular: Negative for chest pain, palpitations and leg swelling.   Gastrointestinal: Negative for abdominal pain, constipation, diarrhea, nausea and vomiting.   Genitourinary: Negative for dysuria, frequency and urgency.   Musculoskeletal: Negative for falls and myalgias.   Skin: Negative for rash.   Neurological: Negative for dizziness, sensory change, speech change, focal weakness,  loss of consciousness and headaches.   Psychiatric/Behavioral: Positive for depression. Negative for substance abuse and suicidal ideas.       Consultants/Specialty  None    Disposition  In-patient    Quality Measures    Reviewed items::  Labs reviewed and Medications reviewed  Walls catheter::  No Walls  DVT: on Xeralto, for prior hx of DVT.      Physical Exam     Vitals:    12/04/17 0400 12/04/17 0800 12/04/17 1135 12/04/17 1600   BP: (!) 93/63 115/57 118/76 106/62   Pulse: 74 79 84 77   Resp: 16 17 18 16   Temp: 36.4 °C (97.6 °F) 36.7 °C (98 °F) 36.6 °C (97.8 °F) 36.9 °C (98.5 °F)   SpO2: 94% 90% 98% 90%   Weight:       Height:         Body mass index is 30.54 kg/m². Weight: 75.8 kg (167 lb)  Oxygen Therapy:  Pulse Oximetry: 90 %, O2 (LPM): 0, O2 Delivery: None (Room Air)    Physical Exam   Constitutional: She is oriented to person, place, and time and well-developed, well-nourished, and in no distress.   HENT:   Head: Atraumatic.   Mouth/Throat: No oropharyngeal exudate.   Dry mucosa.   Eyes: EOM are normal. Pupils are equal, round, and reactive to light. Right eye exhibits no discharge. Left eye exhibits no discharge.   Neck: No tracheal deviation present.   Cardiovascular: Normal rate, regular rhythm and normal heart sounds.    Pulmonary/Chest: Effort normal and breath sounds normal. No stridor. No respiratory distress. She has no wheezes.   Abdominal: Soft. Bowel sounds are normal. She exhibits no distension. There is no tenderness. There is no rebound and no guarding.   Musculoskeletal: She exhibits no edema or tenderness.   Able to get up and walk down hallway without obvious balance or gait problems.    Neurological: She is alert and oriented to person, place, and time.   Skin: Skin is warm and dry.   Mild swelling on back of left hand, from prior IV site.    Psychiatric:   Odd affect.  Appears to have a baseline of uncertainty.  Admits to being depressed.  No SI.          Lab Data Review:     Recent Labs       12/03/17   1630  12/03/17   1730  12/04/17   0220   WBC   --   8.0  9.5   NEUTSPOLYS   --   57.80  39.90*   LYMPHOCYTES   --   31.50  47.50*   MONOCYTES   --   9.30  9.40   EOSINOPHILS   --   0.60  2.30   BASOPHILS   --   0.70  0.70   ASTSGOT  21   --   16   ALTSGPT  16   --   16   ALKPHOSPHAT  140*   --   115*   TBILIRUBIN  2.0*   --   1.5     Recent Labs      12/03/17   1730  12/04/17   0220   RBC  5.94*  5.91*   HEMOGLOBIN  14.7  14.6   HEMATOCRIT  45.9  43.7   PLATELETCT  198  209       Recent Labs      12/03/17   1630  12/04/17   0220  12/04/17   1240   SODIUM  116*  134*  137   POTASSIUM  5.6*  3.3*  4.4   CHLORIDE  79*  97  100   CO2  23  29  31   BUN  36*  29*  30*   CREATININE  2.03*  1.43*  1.24   MAGNESIUM   --   2.2   --    CALCIUM  9.3  8.9  8.8       Recent Labs      12/03/17   1630  12/04/17   0220  12/04/17   1240   ALTSGPT  16  16   --    ASTSGOT  21  16   --    ALKPHOSPHAT  140*  115*   --    TBILIRUBIN  2.0*  1.5   --    GLUCOSE  1085*  165*  251*         Assessment & Plan     HOHG.  - Hyperglycemia / DM2  -Presented with BG of 1085, various other lyte abnormalities that         when adjusted for glucose were about normal range.   -Bicarb wnl, slight gap at 14, do not suspect DKA          Improved after fluids and insulin (decreased to 8)  -Pt denies any symptoms of infxn, essentially entirely asymptomatic         and presented due to depression rather than hyperglycemia,         0/4 SIRS and 0/3 qSOFA   -Received 10+8 U lispro, then restarted her Lantus, 80 units, QHS.          Also received 2L NS bolus, then 150/hr... Now down to 100/hr.    -Appears to be hyperosmolar syndrome 2/2 dietary noncompliance,         She denies noncompliance, but differing hx of med use when asked.         states she has been drinking lots of juice   -A1C = 15.6.     -Hypoglycemia protocol and POC glucose checks       SSI only for > 250, given hx of high glucose in past (A1C).        Will lower, tomorrow.    -Repeat BMP - Q6H, for potassium and other lytes.          BETTY on CKD stage III  -Baseline Cr appears to be ~1.19, ... at admission, it was 2.03.  -UA pending (difficulty obtaining)   -Likely prerenal 2/2 dehydration from hyperosmolar syndrome       Or leading up to it.   -Received 2L NS boluses and started on maintenance        NS decreased from 150 to 100        HTN/HLD  -Holding home lisinopril and spironolactone due to BETTY  -Continue Lopressor and Zocor      DJD  -stopped flexeril, due to confusion, and lack of pain.    -Tylenol PRN for pain     DVT  -Previous ED notes per pt that this occurred ~7/2017 at [facility]   -Appeared in ED on 8/2017 with LE swelling         LE duplex was negative at that time  -Unclear etiology of DVT, provoked/unprovoked,         or plan for duration of therapy as was at OSH  -Continue home Xarelto for now     MDD  -Started on home Cymbalta (pt denies it was for depression)  -came to ER to find access for counseling.       Multiple deaths in the family.   -will need outpatient therapy       PPX  -Xarelto for DVT ppx  -Bowel regimen

## 2017-12-28 ENCOUNTER — OFFICE VISIT (OUTPATIENT)
Dept: URGENT CARE | Facility: PHYSICIAN GROUP | Age: 57
End: 2017-12-28
Payer: MEDICARE

## 2017-12-28 VITALS
SYSTOLIC BLOOD PRESSURE: 126 MMHG | WEIGHT: 173 LBS | DIASTOLIC BLOOD PRESSURE: 70 MMHG | RESPIRATION RATE: 16 BRPM | HEIGHT: 63 IN | TEMPERATURE: 98 F | OXYGEN SATURATION: 98 % | BODY MASS INDEX: 30.65 KG/M2 | HEART RATE: 116 BPM

## 2017-12-28 DIAGNOSIS — L89.312 DECUBITUS ULCER OF RIGHT BUTTOCK, STAGE 2 (HCC): ICD-10-CM

## 2017-12-28 PROCEDURE — 99203 OFFICE O/P NEW LOW 30 MIN: CPT | Performed by: PHYSICIAN ASSISTANT

## 2017-12-28 RX ORDER — TRAMADOL HYDROCHLORIDE 50 MG/1
TABLET ORAL
Qty: 30 TAB | Refills: 0 | Status: SHIPPED | OUTPATIENT
Start: 2017-12-28 | End: 2018-01-08

## 2017-12-28 RX ORDER — GABAPENTIN 300 MG/1
CAPSULE ORAL
Refills: 8 | Status: ON HOLD | COMMUNITY
Start: 2017-11-10 | End: 2018-01-22

## 2017-12-28 RX ORDER — CEPHALEXIN 500 MG/1
500 CAPSULE ORAL 3 TIMES DAILY
Qty: 30 CAP | Refills: 0 | Status: SHIPPED | OUTPATIENT
Start: 2017-12-28 | End: 2018-01-07

## 2017-12-29 ENCOUNTER — TELEPHONE (OUTPATIENT)
Dept: INTERNAL MEDICINE | Facility: MEDICAL CENTER | Age: 57
End: 2017-12-29

## 2017-12-29 NOTE — TELEPHONE ENCOUNTER
1. Caller Name: Pt                      Call Back Number: [phone] (home)     2. Message: Patient called on 12/28 and left a message asking if she still had a doctor.    3. Patient approves office to leave a detailed voicemail/MyChart message: N\A    I called patient back and let her know that she still has a doctor and if she needed help with anything. Patient states that she was taking Xarelto but her insurance no longer is approving her Xarelto and is wondering if she could get something that is equivalent to it that her insurance does cover.

## 2017-12-30 NOTE — TELEPHONE ENCOUNTER
Patient called to inform that her insurance wont cover Xeralto. patient needs to be seen as soon as possible to discuss DVT management. On chart review patient stated that a DVT was found ~07/17 at an outside facility. DVT study in 08/17 was negative while the patient was on blood thinner. Chances are that her insurance will not cover more than 3 months of oral anticoagulant therapy.   She does however need to be re-evaluated in the clinic to obtain further Hx of thrombotic events to assess further need for NOAC therapy.

## 2017-12-30 NOTE — TELEPHONE ENCOUNTER
Attempted to call patient to get patient scheduled. Patient has not v/m set up will try to reach her once again on Tuesday.

## 2017-12-31 ASSESSMENT — ENCOUNTER SYMPTOMS
CARDIOVASCULAR NEGATIVE: 1
NAUSEA: 0
ROS SKIN COMMENTS: SEE HPI
MYALGIAS: 0
ABDOMINAL PAIN: 0
NEUROLOGICAL NEGATIVE: 1
CONSTITUTIONAL NEGATIVE: 1
VOMITING: 0
RESPIRATORY NEGATIVE: 1

## 2017-12-31 NOTE — PROGRESS NOTES
Subjective:      Vin Maciel is a 57 y.o. female who presents with Other (Sore on buttocks X 3 weeks, slight drainage)        Other   Pertinent negatives include no abdominal pain, myalgias, nausea or vomiting.   Patient presents today for about 3 weeks of sore area on her buttocks.  She states it seems to wax and wane in severity, she is not able to see it but her  has been watching it for her.  He states it seems to open up and sometimes will be red and sometimes white. She has not really noticed any drainage from it however.  She notes it can be very painful at times, stinging.  No bleeding.  No changes in bowels.  Recent hx significant for inpatient for 3 days, leaving AMA.  She was seen for hyperglycemia.      Review of Systems   Constitutional: Negative.    HENT: Negative.    Respiratory: Negative.    Cardiovascular: Negative.    Gastrointestinal: Negative for abdominal pain, nausea and vomiting.   Musculoskeletal: Negative for myalgias.        SEE HPI   Skin:        SEE HPI   Neurological: Negative.    Endo/Heme/Allergies: Negative.        PMH:  has a past medical history of DM (diabetes mellitus) (CMS-HCC) and HTN (hypertension).  MEDS:   Current Outpatient Prescriptions:   •  cephALEXin (KEFLEX) 500 MG Cap, Take 1 Cap by mouth 3 times a day for 10 days., Disp: 30 Cap, Rfl: 0  •  tramadol (ULTRAM) 50 MG Tab, 1-2 tablets at bedtime prn severe pain., Disp: 30 Tab, Rfl: 0  •  metformin (GLUCOPHAGE) 500 MG Tab, Take 500 mg by mouth 2 times a day, with meals., Disp: , Rfl:   •  duloxetine (CYMBALTA) 30 MG Cap DR Particles, Take 30 mg by mouth every bedtime., Disp: , Rfl:   •  lisinopril (PRINIVIL) 10 MG Tab, Take 10 mg by mouth every day., Disp: , Rfl:   •  omeprazole (PRILOSEC) 20 MG CPDR, Take 20 mg by mouth every day., Disp: , Rfl:   •  metoprolol (LOPRESSOR) 50 MG TABS, Take 50 mg by mouth 2 times a day., Disp: , Rfl:   •  clonazepam (KLONOPIN) 1 MG TABS, Take 1 mg by mouth 3 times a day., Disp: ,  "Rfl:   •  hydrOXYzine (ATARAX) 50 MG TABS, Take 150 mg by mouth every bedtime., Disp: , Rfl:   •  spironolactone (ALDACTONE) 100 MG TABS, Take 100 mg by mouth every day., Disp: , Rfl:   •  glipiZIDE SR (GLUCOTROL) 2.5 MG TB24, Take 2.5 mg by mouth every day., Disp: , Rfl:   •  simvastatin (ZOCOR) 20 MG TABS, Take 20 mg by mouth every evening., Disp: , Rfl:   •  gabapentin (NEURONTIN) 300 MG Cap, TK 1 C TID, Disp: , Rfl: 8  ALLERGIES:   Allergies   Allergen Reactions   • Sulfa Drugs Nausea     N/V nervousness     SURGHX:   Past Surgical History:   Procedure Laterality Date   • APPENDECTOMY     • CHOLECYSTECTOMY     • HCHG  DELIVERY SER     • HYSTERECTOMY, TOTAL ABDOMINAL     • LAMINOTOMY     • OTHER      back, neck, shoulder surgeries   • OTHER ORTHOPEDIC SURGERY       SOCHX:  reports that she has been smoking.  She has been smoking about 1.00 pack per day. She has never used smokeless tobacco. She reports that she does not drink alcohol or use drugs.  FH: Family history was reviewed, no pertinent findings to report     Objective:     /70   Pulse (!) 116   Temp 36.7 °C (98 °F)   Resp 16   Ht 1.6 m (5' 3\")   Wt 78.5 kg (173 lb)   SpO2 98%   BMI 30.65 kg/m²      Physical Exam   Constitutional: She is oriented to person, place, and time. She appears well-developed and well-nourished. No distress.   Cardiovascular: Normal rate and regular rhythm.    Pulmonary/Chest: Effort normal and breath sounds normal.   Musculoskeletal:        Back:    Neurological: She is alert and oriented to person, place, and time.   Psychiatric: She has a normal mood and affect. Her behavior is normal.   Vitals reviewed.         Assessment/Plan:     1. Decubitus ulcer of right buttock, stage 2  REFERRAL TO WOUND CLINIC    cephALEXin (KEFLEX) 500 MG Cap    tramadol (ULTRAM) 50 MG Tab       -consistent with stage 2 decubitus ulceration of superior right buttock region.   -patient placed with a wet to dry dressing in clinic " however will need follow up and wound care for this.   -mild erythema will place on some abx coverage at this time, hx of DM.   -patient does admit she is fairly sedentary at home however recent hospital stay likely exacerbating cause.    -renewed Tramadol for patient.  Last fill  verified.   -RTC precautions discussed in detail as well as ER precautions.       Supportive care, differential diagnoses, and indications for immediate follow-up discussed with patient.   Pathogenesis of diagnosis discussed including typical length and natural progression.   Instructed to return to clinic or nearest emergency department for any change in condition, further concerns, or worsening of symptoms.  Patient states understanding of the plan of care and discharge instructions.  Instructed to make an appointment, for follow up, with their primary care provider.        Telma Montoya P.A.-C.

## 2018-01-01 ENCOUNTER — APPOINTMENT (OUTPATIENT)
Dept: WOUND CARE | Facility: MEDICAL CENTER | Age: 58
End: 2018-01-01
Attending: FAMILY MEDICINE
Payer: MEDICARE

## 2018-01-01 ENCOUNTER — OFFICE VISIT (OUTPATIENT)
Dept: URGENT CARE | Facility: PHYSICIAN GROUP | Age: 58
End: 2018-01-01
Payer: MEDICARE

## 2018-01-01 ENCOUNTER — TELEPHONE (OUTPATIENT)
Dept: INTERNAL MEDICINE | Facility: MEDICAL CENTER | Age: 58
End: 2018-01-01

## 2018-01-01 ENCOUNTER — HOSPITAL ENCOUNTER (OUTPATIENT)
Facility: MEDICAL CENTER | Age: 58
End: 2018-02-19
Attending: FAMILY MEDICINE
Payer: MEDICARE

## 2018-01-01 ENCOUNTER — OFFICE VISIT (OUTPATIENT)
Dept: INTERNAL MEDICINE | Facility: MEDICAL CENTER | Age: 58
End: 2018-01-01
Payer: MEDICARE

## 2018-01-01 ENCOUNTER — TELEPHONE (OUTPATIENT)
Dept: BEHAVIORAL HEALTH | Facility: MEDICAL CENTER | Age: 58
End: 2018-01-01

## 2018-01-01 ENCOUNTER — APPOINTMENT (OUTPATIENT)
Dept: WOUND CARE | Facility: MEDICAL CENTER | Age: 58
End: 2018-01-01
Payer: MEDICARE

## 2018-01-01 ENCOUNTER — PATIENT OUTREACH (OUTPATIENT)
Dept: HEALTH INFORMATION MANAGEMENT | Facility: OTHER | Age: 58
End: 2018-01-01

## 2018-01-01 ENCOUNTER — TELEPHONE (OUTPATIENT)
Dept: URGENT CARE | Facility: CLINIC | Age: 58
End: 2018-01-01

## 2018-01-01 VITALS
RESPIRATION RATE: 15 BRPM | HEART RATE: 105 BPM | DIASTOLIC BLOOD PRESSURE: 80 MMHG | BODY MASS INDEX: 29.23 KG/M2 | SYSTOLIC BLOOD PRESSURE: 126 MMHG | OXYGEN SATURATION: 96 % | TEMPERATURE: 98.8 F | WEIGHT: 165 LBS | HEIGHT: 63 IN

## 2018-01-01 VITALS
BODY MASS INDEX: 30.18 KG/M2 | SYSTOLIC BLOOD PRESSURE: 102 MMHG | WEIGHT: 165 LBS | HEART RATE: 100 BPM | OXYGEN SATURATION: 98 % | TEMPERATURE: 98.2 F | DIASTOLIC BLOOD PRESSURE: 58 MMHG

## 2018-01-01 VITALS
DIASTOLIC BLOOD PRESSURE: 76 MMHG | WEIGHT: 180 LBS | HEART RATE: 116 BPM | HEIGHT: 63 IN | TEMPERATURE: 97.6 F | OXYGEN SATURATION: 99 % | BODY MASS INDEX: 31.89 KG/M2 | SYSTOLIC BLOOD PRESSURE: 110 MMHG

## 2018-01-01 DIAGNOSIS — M51.36 DEGENERATIVE DISC DISEASE, LUMBAR: ICD-10-CM

## 2018-01-01 DIAGNOSIS — Z79.4 TYPE 2 DIABETES MELLITUS WITH DIABETIC POLYNEUROPATHY, WITH LONG-TERM CURRENT USE OF INSULIN (HCC): ICD-10-CM

## 2018-01-01 DIAGNOSIS — Z79.4 TYPE 2 DIABETES MELLITUS WITH COMPLICATION, WITH LONG-TERM CURRENT USE OF INSULIN (HCC): ICD-10-CM

## 2018-01-01 DIAGNOSIS — E11.42 TYPE 2 DIABETES MELLITUS WITH DIABETIC POLYNEUROPATHY, WITH LONG-TERM CURRENT USE OF INSULIN (HCC): ICD-10-CM

## 2018-01-01 DIAGNOSIS — E11.8 TYPE 2 DIABETES MELLITUS WITH COMPLICATION, WITH LONG-TERM CURRENT USE OF INSULIN (HCC): ICD-10-CM

## 2018-01-01 DIAGNOSIS — L08.9 INFECTED BLISTER: ICD-10-CM

## 2018-01-01 DIAGNOSIS — J45.909 ASTHMA DUE TO SEASONAL ALLERGIES: ICD-10-CM

## 2018-01-01 DIAGNOSIS — T14.8XXA INFECTED BLISTER: ICD-10-CM

## 2018-01-01 DIAGNOSIS — L89.301 DECUBITUS ULCER OF BUTTOCK, STAGE 1, UNSPECIFIED LATERALITY: ICD-10-CM

## 2018-01-01 DIAGNOSIS — I10 HYPERTENSION, UNSPECIFIED TYPE: ICD-10-CM

## 2018-01-01 DIAGNOSIS — F32.A DEPRESSION, UNSPECIFIED DEPRESSION TYPE: ICD-10-CM

## 2018-01-01 DIAGNOSIS — K21.9 GASTROESOPHAGEAL REFLUX DISEASE, ESOPHAGITIS PRESENCE NOT SPECIFIED: ICD-10-CM

## 2018-01-01 DIAGNOSIS — E78.5 HYPERLIPIDEMIA, UNSPECIFIED HYPERLIPIDEMIA TYPE: ICD-10-CM

## 2018-01-01 DIAGNOSIS — F17.200 SMOKING: ICD-10-CM

## 2018-01-01 DIAGNOSIS — F41.9 ANXIETY: ICD-10-CM

## 2018-01-01 DIAGNOSIS — L89.152 DECUBITUS ULCER OF SACRAL REGION, STAGE 2 (HCC): ICD-10-CM

## 2018-01-01 LAB
BACTERIA SPEC ANAEROBE CULT: NORMAL
BACTERIA WND AEROBE CULT: NORMAL
GRAM STN SPEC: NORMAL
GRAM STN SPEC: NORMAL
SIGNIFICANT IND 70042: NORMAL
SITE SITE: NORMAL
SOURCE SOURCE: NORMAL

## 2018-01-01 PROCEDURE — 99214 OFFICE O/P EST MOD 30 MIN: CPT | Performed by: FAMILY MEDICINE

## 2018-01-01 PROCEDURE — 87070 CULTURE OTHR SPECIMN AEROBIC: CPT

## 2018-01-01 PROCEDURE — 87075 CULTR BACTERIA EXCEPT BLOOD: CPT

## 2018-01-01 PROCEDURE — 87205 SMEAR GRAM STAIN: CPT

## 2018-01-01 PROCEDURE — 99214 OFFICE O/P EST MOD 30 MIN: CPT | Performed by: PHYSICIAN ASSISTANT

## 2018-01-01 PROCEDURE — 99214 OFFICE O/P EST MOD 30 MIN: CPT | Mod: GC | Performed by: INTERNAL MEDICINE

## 2018-01-01 RX ORDER — CYCLOBENZAPRINE HCL 10 MG
10 TABLET ORAL 3 TIMES DAILY PRN
COMMUNITY
End: 2018-01-01 | Stop reason: SDUPTHER

## 2018-01-01 RX ORDER — CYCLOBENZAPRINE HCL 10 MG
10 TABLET ORAL 3 TIMES DAILY PRN
Qty: 30 TAB | Refills: 0 | Status: SHIPPED | OUTPATIENT
Start: 2018-01-01 | End: 2018-01-01 | Stop reason: SDUPTHER

## 2018-01-01 RX ORDER — ATORVASTATIN CALCIUM 40 MG/1
40 TABLET, FILM COATED ORAL DAILY
Qty: 30 TAB | Refills: 3 | Status: SHIPPED | OUTPATIENT
Start: 2018-01-01 | End: 2018-01-01

## 2018-01-01 RX ORDER — SPIRONOLACTONE 50 MG/1
50 TABLET, FILM COATED ORAL DAILY
Qty: 30 TAB | Refills: 3 | Status: SHIPPED | OUTPATIENT
Start: 2018-01-01

## 2018-01-01 RX ORDER — CEPHALEXIN 500 MG/1
500 CAPSULE ORAL 3 TIMES DAILY
Qty: 21 CAP | Refills: 0 | Status: SHIPPED | OUTPATIENT
Start: 2018-01-01 | End: 2018-01-01

## 2018-01-01 RX ORDER — CLONAZEPAM 1 MG/1
1 TABLET ORAL 3 TIMES DAILY
COMMUNITY

## 2018-01-01 RX ORDER — ZOLPIDEM TARTRATE 5 MG/1
5 TABLET ORAL NIGHTLY PRN
COMMUNITY

## 2018-01-01 RX ORDER — HYDROCHLOROTHIAZIDE 50 MG/1
50 TABLET ORAL DAILY
Qty: 30 TAB | Refills: 3 | Status: SHIPPED | OUTPATIENT
Start: 2018-01-01 | End: 2018-01-01

## 2018-01-01 RX ORDER — OMEPRAZOLE 20 MG/1
20 CAPSULE, DELAYED RELEASE ORAL DAILY
COMMUNITY
End: 2018-01-01 | Stop reason: SDUPTHER

## 2018-01-01 RX ORDER — CLINDAMYCIN HYDROCHLORIDE 300 MG/1
300 CAPSULE ORAL 3 TIMES DAILY
Qty: 21 CAP | Refills: 0 | Status: SHIPPED | OUTPATIENT
Start: 2018-01-01 | End: 2018-01-01 | Stop reason: SDUPTHER

## 2018-01-01 RX ORDER — SPIRONOLACTONE 50 MG/1
50 TABLET, FILM COATED ORAL DAILY
COMMUNITY
End: 2018-01-01 | Stop reason: SDUPTHER

## 2018-01-01 RX ORDER — CLINDAMYCIN HYDROCHLORIDE 300 MG/1
300 CAPSULE ORAL 3 TIMES DAILY
Qty: 21 CAP | Refills: 0 | Status: SHIPPED | OUTPATIENT
Start: 2018-01-01 | End: 2018-01-01

## 2018-01-01 RX ORDER — ALBUTEROL SULFATE 90 UG/1
2 AEROSOL, METERED RESPIRATORY (INHALATION) EVERY 6 HOURS PRN
Qty: 8.5 G | Refills: 0 | Status: SHIPPED | OUTPATIENT
Start: 2018-01-01

## 2018-01-01 RX ORDER — TRAMADOL HYDROCHLORIDE 50 MG/1
50 TABLET ORAL EVERY 4 HOURS PRN
COMMUNITY

## 2018-01-01 RX ORDER — DULOXETIN HYDROCHLORIDE 30 MG/1
30 CAPSULE, DELAYED RELEASE ORAL 2 TIMES DAILY
Qty: 30 CAP | Refills: 3 | Status: SHIPPED | OUTPATIENT
Start: 2018-01-01 | End: 2018-01-01

## 2018-01-01 RX ORDER — OMEPRAZOLE 20 MG/1
20 CAPSULE, DELAYED RELEASE ORAL DAILY
Qty: 30 CAP | Refills: 3 | Status: SHIPPED | OUTPATIENT
Start: 2018-01-01

## 2018-01-01 RX ORDER — HYDROCHLOROTHIAZIDE 50 MG/1
50 TABLET ORAL DAILY
COMMUNITY
End: 2018-01-01 | Stop reason: SDUPTHER

## 2018-01-01 ASSESSMENT — ENCOUNTER SYMPTOMS
SENSORY CHANGE: 1
PSYCHIATRIC NEGATIVE: 1
MUSCULOSKELETAL NEGATIVE: 1
ROS SKIN COMMENTS: SEE HPI
CONSTITUTIONAL NEGATIVE: 1
CARDIOVASCULAR NEGATIVE: 1
WHEEZING: 1

## 2018-01-21 ENCOUNTER — APPOINTMENT (OUTPATIENT)
Dept: RADIOLOGY | Facility: MEDICAL CENTER | Age: 58
DRG: 637 | End: 2018-01-21
Attending: EMERGENCY MEDICINE
Payer: MEDICARE

## 2018-01-21 ENCOUNTER — HOSPITAL ENCOUNTER (INPATIENT)
Facility: MEDICAL CENTER | Age: 58
LOS: 4 days | DRG: 637 | End: 2018-01-26
Attending: EMERGENCY MEDICINE | Admitting: HOSPITALIST
Payer: MEDICARE

## 2018-01-21 DIAGNOSIS — E10.10 DIABETIC KETOACIDOSIS WITHOUT COMA ASSOCIATED WITH TYPE 1 DIABETES MELLITUS (HCC): ICD-10-CM

## 2018-01-21 LAB
ANISOCYTOSIS BLD QL SMEAR: ABNORMAL
BASOPHILS # BLD AUTO: 0.2 % (ref 0–1.8)
BASOPHILS # BLD: 0.03 K/UL (ref 0–0.12)
BLOOD CULTURE HOLD CXBCH: NORMAL
COMMENT 1642: NORMAL
EOSINOPHIL # BLD AUTO: 0 K/UL (ref 0–0.51)
EOSINOPHIL NFR BLD: 0 % (ref 0–6.9)
ERYTHROCYTE [DISTWIDTH] IN BLOOD BY AUTOMATED COUNT: 46 FL (ref 35.9–50)
GLUCOSE BLD-MCNC: >600 MG/DL (ref 65–99)
HCT VFR BLD AUTO: 54.9 % (ref 37–47)
HGB BLD-MCNC: 16 G/DL (ref 12–16)
IMM GRANULOCYTES # BLD AUTO: 0.06 K/UL (ref 0–0.11)
IMM GRANULOCYTES NFR BLD AUTO: 0.5 % (ref 0–0.9)
LYMPHOCYTES # BLD AUTO: 0.88 K/UL (ref 1–4.8)
LYMPHOCYTES NFR BLD: 6.7 % (ref 22–41)
MCH RBC QN AUTO: 25.4 PG (ref 27–33)
MCHC RBC AUTO-ENTMCNC: 29.1 G/DL (ref 33.6–35)
MCV RBC AUTO: 87.1 FL (ref 81.4–97.8)
MICROCYTES BLD QL SMEAR: ABNORMAL
MONOCYTES # BLD AUTO: 1.31 K/UL (ref 0–0.85)
MONOCYTES NFR BLD AUTO: 9.9 % (ref 0–13.4)
MORPHOLOGY BLD-IMP: NORMAL
NEUTROPHILS # BLD AUTO: 10.94 K/UL (ref 2–7.15)
NEUTROPHILS NFR BLD: 82.7 % (ref 44–72)
NRBC # BLD AUTO: 0 K/UL
NRBC BLD-RTO: 0 /100 WBC
PLATELET # BLD AUTO: 274 K/UL (ref 164–446)
PLATELET BLD QL SMEAR: NORMAL
PMV BLD AUTO: 11.6 FL (ref 9–12.9)
RBC # BLD AUTO: 6.3 M/UL (ref 4.2–5.4)
RBC BLD AUTO: PRESENT
WBC # BLD AUTO: 13.2 K/UL (ref 4.8–10.8)

## 2018-01-21 PROCEDURE — 96361 HYDRATE IV INFUSION ADD-ON: CPT

## 2018-01-21 PROCEDURE — 700105 HCHG RX REV CODE 258: Performed by: EMERGENCY MEDICINE

## 2018-01-21 PROCEDURE — 82962 GLUCOSE BLOOD TEST: CPT

## 2018-01-21 PROCEDURE — 83690 ASSAY OF LIPASE: CPT

## 2018-01-21 PROCEDURE — 99291 CRITICAL CARE FIRST HOUR: CPT

## 2018-01-21 PROCEDURE — 70450 CT HEAD/BRAIN W/O DYE: CPT

## 2018-01-21 PROCEDURE — 02HV33Z INSERTION OF INFUSION DEVICE INTO SUPERIOR VENA CAVA, PERCUTANEOUS APPROACH: ICD-10-PCS | Performed by: EMERGENCY MEDICINE

## 2018-01-21 PROCEDURE — 85025 COMPLETE CBC W/AUTO DIFF WBC: CPT

## 2018-01-21 PROCEDURE — 80053 COMPREHEN METABOLIC PANEL: CPT

## 2018-01-21 RX ORDER — TRAMADOL HYDROCHLORIDE 50 MG/1
50 TABLET ORAL EVERY 4 HOURS PRN
Status: ON HOLD | COMMUNITY
End: 2018-01-22

## 2018-01-21 RX ORDER — ZOLPIDEM TARTRATE 5 MG/1
5 TABLET ORAL
Status: ON HOLD | COMMUNITY
End: 2018-02-03

## 2018-01-21 RX ORDER — SODIUM CHLORIDE 9 MG/ML
1000 INJECTION, SOLUTION INTRAVENOUS ONCE
Status: COMPLETED | OUTPATIENT
Start: 2018-01-21 | End: 2018-01-21

## 2018-01-21 RX ORDER — ATORVASTATIN CALCIUM 10 MG/1
10 TABLET, FILM COATED ORAL NIGHTLY
Status: ON HOLD | COMMUNITY
End: 2018-01-22

## 2018-01-21 RX ADMIN — SODIUM CHLORIDE 1000 ML: 9 INJECTION, SOLUTION INTRAVENOUS at 23:15

## 2018-01-21 ASSESSMENT — LIFESTYLE VARIABLES: DO YOU DRINK ALCOHOL: NO

## 2018-01-21 ASSESSMENT — PAIN SCALES - GENERAL: PAINLEVEL_OUTOF10: 0

## 2018-01-22 ENCOUNTER — APPOINTMENT (OUTPATIENT)
Dept: RADIOLOGY | Facility: MEDICAL CENTER | Age: 58
DRG: 637 | End: 2018-01-22
Attending: EMERGENCY MEDICINE
Payer: MEDICARE

## 2018-01-22 ENCOUNTER — RESOLUTE PROFESSIONAL BILLING HOSPITAL PROF FEE (OUTPATIENT)
Dept: HOSPITALIST | Facility: MEDICAL CENTER | Age: 58
End: 2018-01-22
Payer: MEDICARE

## 2018-01-22 PROBLEM — E11.10 DKA (DIABETIC KETOACIDOSES): Status: ACTIVE | Noted: 2018-01-22

## 2018-01-22 PROBLEM — G93.41 METABOLIC ENCEPHALOPATHY: Status: ACTIVE | Noted: 2018-01-22

## 2018-01-22 LAB
ALBUMIN SERPL BCP-MCNC: 4.8 G/DL (ref 3.2–4.9)
ALBUMIN/GLOB SERPL: 1.5 G/DL
ALP SERPL-CCNC: 146 U/L (ref 30–99)
ALT SERPL-CCNC: 14 U/L (ref 2–50)
AMMONIA PLAS-SCNC: 35 UMOL/L (ref 11–45)
AMPHET UR QL SCN: NEGATIVE
ANION GAP SERPL CALC-SCNC: 11 MMOL/L (ref 0–11.9)
ANION GAP SERPL CALC-SCNC: 11 MMOL/L (ref 0–11.9)
ANION GAP SERPL CALC-SCNC: 20 MMOL/L (ref 0–11.9)
ANION GAP SERPL CALC-SCNC: 25 MMOL/L (ref 0–11.9)
ANION GAP SERPL CALC-SCNC: 6 MMOL/L (ref 0–11.9)
APPEARANCE UR: CLEAR
AST SERPL-CCNC: 10 U/L (ref 12–45)
BACTERIA #/AREA URNS HPF: NEGATIVE /HPF
BARBITURATES UR QL SCN: NEGATIVE
BENZODIAZ UR QL SCN: NEGATIVE
BILIRUB SERPL-MCNC: 1.5 MG/DL (ref 0.1–1.5)
BILIRUB UR QL STRIP.AUTO: NEGATIVE
BUN SERPL-MCNC: 35 MG/DL (ref 8–22)
BUN SERPL-MCNC: 35 MG/DL (ref 8–22)
BUN SERPL-MCNC: 39 MG/DL (ref 8–22)
BUN SERPL-MCNC: 47 MG/DL (ref 8–22)
BUN SERPL-MCNC: 54 MG/DL (ref 8–22)
BZE UR QL SCN: NEGATIVE
CALCIUM SERPL-MCNC: 10.2 MG/DL (ref 8.5–10.5)
CALCIUM SERPL-MCNC: 7.7 MG/DL (ref 8.5–10.5)
CALCIUM SERPL-MCNC: 8 MG/DL (ref 8.5–10.5)
CALCIUM SERPL-MCNC: 8.3 MG/DL (ref 8.5–10.5)
CALCIUM SERPL-MCNC: 8.8 MG/DL (ref 8.5–10.5)
CANNABINOIDS UR QL SCN: NEGATIVE
CHLORIDE SERPL-SCNC: 103 MMOL/L (ref 96–112)
CHLORIDE SERPL-SCNC: 113 MMOL/L (ref 96–112)
CHLORIDE SERPL-SCNC: 114 MMOL/L (ref 96–112)
CHLORIDE SERPL-SCNC: 114 MMOL/L (ref 96–112)
CHLORIDE SERPL-SCNC: 91 MMOL/L (ref 96–112)
CO2 SERPL-SCNC: 16 MMOL/L (ref 20–33)
CO2 SERPL-SCNC: 17 MMOL/L (ref 20–33)
CO2 SERPL-SCNC: 22 MMOL/L (ref 20–33)
CO2 SERPL-SCNC: 22 MMOL/L (ref 20–33)
CO2 SERPL-SCNC: 27 MMOL/L (ref 20–33)
COLOR UR: YELLOW
CREAT SERPL-MCNC: 1.31 MG/DL (ref 0.5–1.4)
CREAT SERPL-MCNC: 1.33 MG/DL (ref 0.5–1.4)
CREAT SERPL-MCNC: 1.5 MG/DL (ref 0.5–1.4)
CREAT SERPL-MCNC: 2.03 MG/DL (ref 0.5–1.4)
CREAT SERPL-MCNC: 2.5 MG/DL (ref 0.5–1.4)
CULTURE IF INDICATED INDCX: NO UA CULTURE
EPI CELLS #/AREA URNS HPF: NEGATIVE /HPF
ETHANOL BLD-MCNC: 0 G/DL
GLOBULIN SER CALC-MCNC: 3.3 G/DL (ref 1.9–3.5)
GLUCOSE BLD-MCNC: 285 MG/DL (ref 65–99)
GLUCOSE BLD-MCNC: 402 MG/DL (ref 65–99)
GLUCOSE BLD-MCNC: 468 MG/DL (ref 65–99)
GLUCOSE BLD-MCNC: 470 MG/DL (ref 65–99)
GLUCOSE BLD-MCNC: 491 MG/DL (ref 65–99)
GLUCOSE BLD-MCNC: 494 MG/DL (ref 65–99)
GLUCOSE BLD-MCNC: 503 MG/DL (ref 65–99)
GLUCOSE BLD-MCNC: 520 MG/DL (ref 65–99)
GLUCOSE BLD-MCNC: 548 MG/DL (ref 65–99)
GLUCOSE BLD-MCNC: 559 MG/DL (ref 65–99)
GLUCOSE BLD-MCNC: >600 MG/DL (ref 65–99)
GLUCOSE SERPL-MCNC: 1064 MG/DL (ref 65–99)
GLUCOSE SERPL-MCNC: 1500 MG/DL (ref 65–99)
GLUCOSE SERPL-MCNC: 528 MG/DL (ref 65–99)
GLUCOSE SERPL-MCNC: 572 MG/DL (ref 65–99)
GLUCOSE SERPL-MCNC: 643 MG/DL (ref 65–99)
GLUCOSE SERPL-MCNC: 744 MG/DL (ref 65–99)
GLUCOSE SERPL-MCNC: 840 MG/DL (ref 65–99)
GLUCOSE SERPL-MCNC: 998 MG/DL (ref 65–99)
GLUCOSE UR STRIP.AUTO-MCNC: >=1000 MG/DL
HYALINE CASTS #/AREA URNS LPF: NORMAL /LPF
INR PPP: 1.2 (ref 0.87–1.13)
KETONES UR STRIP.AUTO-MCNC: 15 MG/DL
LEUKOCYTE ESTERASE UR QL STRIP.AUTO: NEGATIVE
LIPASE SERPL-CCNC: 205 U/L (ref 11–82)
MAGNESIUM SERPL-MCNC: 2.2 MG/DL (ref 1.5–2.5)
MAGNESIUM SERPL-MCNC: 2.4 MG/DL (ref 1.5–2.5)
METHADONE UR QL SCN: NEGATIVE
MICRO URNS: ABNORMAL
NITRITE UR QL STRIP.AUTO: NEGATIVE
OPIATES UR QL SCN: NEGATIVE
OXYCODONE UR QL SCN: NEGATIVE
PCP UR QL SCN: NEGATIVE
PH UR STRIP.AUTO: 5 [PH]
PHOSPHATE SERPL-MCNC: 2.6 MG/DL (ref 2.5–4.5)
PHOSPHATE SERPL-MCNC: 3.8 MG/DL (ref 2.5–4.5)
POTASSIUM SERPL-SCNC: 4 MMOL/L (ref 3.6–5.5)
POTASSIUM SERPL-SCNC: 4.1 MMOL/L (ref 3.6–5.5)
POTASSIUM SERPL-SCNC: 4.5 MMOL/L (ref 3.6–5.5)
POTASSIUM SERPL-SCNC: 4.9 MMOL/L (ref 3.6–5.5)
POTASSIUM SERPL-SCNC: 5 MMOL/L (ref 3.6–5.5)
PROPOXYPH UR QL SCN: NEGATIVE
PROT SERPL-MCNC: 8.1 G/DL (ref 6–8.2)
PROT UR QL STRIP: NEGATIVE MG/DL
PROTHROMBIN TIME: 14.9 SEC (ref 12–14.6)
RBC # URNS HPF: NORMAL /HPF
RBC UR QL AUTO: ABNORMAL
SODIUM SERPL-SCNC: 132 MMOL/L (ref 135–145)
SODIUM SERPL-SCNC: 140 MMOL/L (ref 135–145)
SODIUM SERPL-SCNC: 146 MMOL/L (ref 135–145)
SODIUM SERPL-SCNC: 147 MMOL/L (ref 135–145)
SODIUM SERPL-SCNC: 147 MMOL/L (ref 135–145)
SP GR UR STRIP.AUTO: 1.03
UROBILINOGEN UR STRIP.AUTO-MCNC: 0.2 MG/DL
WBC #/AREA URNS HPF: NORMAL /HPF

## 2018-01-22 PROCEDURE — 700102 HCHG RX REV CODE 250 W/ 637 OVERRIDE(OP): Performed by: HOSPITALIST

## 2018-01-22 PROCEDURE — 71045 X-RAY EXAM CHEST 1 VIEW: CPT

## 2018-01-22 PROCEDURE — 84100 ASSAY OF PHOSPHORUS: CPT

## 2018-01-22 PROCEDURE — 700105 HCHG RX REV CODE 258: Performed by: EMERGENCY MEDICINE

## 2018-01-22 PROCEDURE — 700105 HCHG RX REV CODE 258: Performed by: HOSPITALIST

## 2018-01-22 PROCEDURE — C1751 CATH, INF, PER/CENT/MIDLINE: HCPCS | Performed by: EMERGENCY MEDICINE

## 2018-01-22 PROCEDURE — 700111 HCHG RX REV CODE 636 W/ 250 OVERRIDE (IP): Performed by: INTERNAL MEDICINE

## 2018-01-22 PROCEDURE — 83735 ASSAY OF MAGNESIUM: CPT

## 2018-01-22 PROCEDURE — 36556 INSERT NON-TUNNEL CV CATH: CPT

## 2018-01-22 PROCEDURE — 96361 HYDRATE IV INFUSION ADD-ON: CPT

## 2018-01-22 PROCEDURE — A9270 NON-COVERED ITEM OR SERVICE: HCPCS | Performed by: HOSPITALIST

## 2018-01-22 PROCEDURE — 82962 GLUCOSE BLOOD TEST: CPT

## 2018-01-22 PROCEDURE — 82947 ASSAY GLUCOSE BLOOD QUANT: CPT

## 2018-01-22 PROCEDURE — 700102 HCHG RX REV CODE 250 W/ 637 OVERRIDE(OP): Performed by: EMERGENCY MEDICINE

## 2018-01-22 PROCEDURE — 80048 BASIC METABOLIC PNL TOTAL CA: CPT

## 2018-01-22 PROCEDURE — 96365 THER/PROPH/DIAG IV INF INIT: CPT

## 2018-01-22 PROCEDURE — 770022 HCHG ROOM/CARE - ICU (200)

## 2018-01-22 PROCEDURE — 700111 HCHG RX REV CODE 636 W/ 250 OVERRIDE (IP): Performed by: HOSPITALIST

## 2018-01-22 PROCEDURE — 80307 DRUG TEST PRSMV CHEM ANLYZR: CPT

## 2018-01-22 PROCEDURE — 99291 CRITICAL CARE FIRST HOUR: CPT | Performed by: HOSPITALIST

## 2018-01-22 PROCEDURE — 85610 PROTHROMBIN TIME: CPT

## 2018-01-22 PROCEDURE — 82140 ASSAY OF AMMONIA: CPT

## 2018-01-22 PROCEDURE — 36415 COLL VENOUS BLD VENIPUNCTURE: CPT

## 2018-01-22 PROCEDURE — C1751 CATH, INF, PER/CENT/MIDLINE: HCPCS

## 2018-01-22 PROCEDURE — 81001 URINALYSIS AUTO W/SCOPE: CPT

## 2018-01-22 RX ORDER — LISINOPRIL 10 MG/1
10 TABLET ORAL DAILY
Status: DISCONTINUED | OUTPATIENT
Start: 2018-01-22 | End: 2018-01-26 | Stop reason: HOSPADM

## 2018-01-22 RX ORDER — CEPHALEXIN 500 MG/1
500 CAPSULE ORAL 3 TIMES DAILY
COMMUNITY
End: 2018-01-01

## 2018-01-22 RX ORDER — DEXTROSE AND SODIUM CHLORIDE 5; .45 G/100ML; G/100ML
INJECTION, SOLUTION INTRAVENOUS CONTINUOUS
Status: DISCONTINUED | OUTPATIENT
Start: 2018-01-22 | End: 2018-01-22

## 2018-01-22 RX ORDER — SIMVASTATIN 20 MG
20 TABLET ORAL EVERY EVENING
Status: DISCONTINUED | OUTPATIENT
Start: 2018-01-22 | End: 2018-01-26 | Stop reason: HOSPADM

## 2018-01-22 RX ORDER — HYDROMORPHONE HYDROCHLORIDE 2 MG/ML
.5-1 INJECTION, SOLUTION INTRAMUSCULAR; INTRAVENOUS; SUBCUTANEOUS
Status: DISCONTINUED | OUTPATIENT
Start: 2018-01-22 | End: 2018-01-26 | Stop reason: HOSPADM

## 2018-01-22 RX ORDER — POTASSIUM CHLORIDE 7.45 MG/ML
10 INJECTION INTRAVENOUS ONCE
Status: COMPLETED | OUTPATIENT
Start: 2018-01-22 | End: 2018-01-22

## 2018-01-22 RX ORDER — SODIUM CHLORIDE, SODIUM LACTATE, POTASSIUM CHLORIDE, CALCIUM CHLORIDE 600; 310; 30; 20 MG/100ML; MG/100ML; MG/100ML; MG/100ML
2000 INJECTION, SOLUTION INTRAVENOUS ONCE
Status: COMPLETED | OUTPATIENT
Start: 2018-01-22 | End: 2018-01-22

## 2018-01-22 RX ORDER — LORAZEPAM 2 MG/ML
0.5 INJECTION INTRAMUSCULAR ONCE
Status: DISCONTINUED | OUTPATIENT
Start: 2018-01-22 | End: 2018-01-23

## 2018-01-22 RX ORDER — ONDANSETRON 2 MG/ML
4 INJECTION INTRAMUSCULAR; INTRAVENOUS EVERY 4 HOURS PRN
Status: DISCONTINUED | OUTPATIENT
Start: 2018-01-22 | End: 2018-01-26 | Stop reason: HOSPADM

## 2018-01-22 RX ORDER — OMEPRAZOLE 20 MG/1
20 CAPSULE, DELAYED RELEASE ORAL DAILY
Status: DISCONTINUED | OUTPATIENT
Start: 2018-01-22 | End: 2018-01-25

## 2018-01-22 RX ORDER — ZIPRASIDONE MESYLATE 20 MG/ML
20 INJECTION, POWDER, LYOPHILIZED, FOR SOLUTION INTRAMUSCULAR ONCE
Status: COMPLETED | OUTPATIENT
Start: 2018-01-22 | End: 2018-01-22

## 2018-01-22 RX ORDER — PROMETHAZINE HYDROCHLORIDE 25 MG/1
12.5-25 SUPPOSITORY RECTAL EVERY 4 HOURS PRN
Status: DISCONTINUED | OUTPATIENT
Start: 2018-01-22 | End: 2018-01-26 | Stop reason: HOSPADM

## 2018-01-22 RX ORDER — MAGNESIUM SULFATE HEPTAHYDRATE 40 MG/ML
4 INJECTION, SOLUTION INTRAVENOUS
Status: DISCONTINUED | OUTPATIENT
Start: 2018-01-22 | End: 2018-01-22

## 2018-01-22 RX ORDER — DEXTROSE AND SODIUM CHLORIDE 10; .45 G/100ML; G/100ML
INJECTION, SOLUTION INTRAVENOUS CONTINUOUS
Status: DISCONTINUED | OUTPATIENT
Start: 2018-01-22 | End: 2018-01-22

## 2018-01-22 RX ORDER — METOPROLOL TARTRATE 50 MG/1
50 TABLET, FILM COATED ORAL 2 TIMES DAILY
Status: DISCONTINUED | OUTPATIENT
Start: 2018-01-22 | End: 2018-01-26 | Stop reason: HOSPADM

## 2018-01-22 RX ORDER — SODIUM CHLORIDE 450 MG/100ML
INJECTION, SOLUTION INTRAVENOUS CONTINUOUS
Status: DISCONTINUED | OUTPATIENT
Start: 2018-01-22 | End: 2018-01-25

## 2018-01-22 RX ORDER — HALOPERIDOL 5 MG/ML
4 INJECTION INTRAMUSCULAR ONCE
Status: COMPLETED | OUTPATIENT
Start: 2018-01-22 | End: 2018-01-22

## 2018-01-22 RX ORDER — ZIPRASIDONE MESYLATE 20 MG/ML
20 INJECTION, POWDER, LYOPHILIZED, FOR SOLUTION INTRAMUSCULAR EVERY 4 HOURS PRN
Status: DISCONTINUED | OUTPATIENT
Start: 2018-01-22 | End: 2018-01-23

## 2018-01-22 RX ORDER — AMOXICILLIN 250 MG
2 CAPSULE ORAL 2 TIMES DAILY
Status: DISCONTINUED | OUTPATIENT
Start: 2018-01-22 | End: 2018-01-26 | Stop reason: HOSPADM

## 2018-01-22 RX ORDER — PROMETHAZINE HYDROCHLORIDE 25 MG/1
12.5-25 TABLET ORAL EVERY 4 HOURS PRN
Status: DISCONTINUED | OUTPATIENT
Start: 2018-01-22 | End: 2018-01-26 | Stop reason: HOSPADM

## 2018-01-22 RX ORDER — ONDANSETRON 4 MG/1
4 TABLET, ORALLY DISINTEGRATING ORAL EVERY 4 HOURS PRN
Status: DISCONTINUED | OUTPATIENT
Start: 2018-01-22 | End: 2018-01-26 | Stop reason: HOSPADM

## 2018-01-22 RX ORDER — SODIUM CHLORIDE, SODIUM LACTATE, POTASSIUM CHLORIDE, CALCIUM CHLORIDE 600; 310; 30; 20 MG/100ML; MG/100ML; MG/100ML; MG/100ML
2000 INJECTION, SOLUTION INTRAVENOUS ONCE
Status: DISCONTINUED | OUTPATIENT
Start: 2018-01-22 | End: 2018-01-22

## 2018-01-22 RX ORDER — DEXTROSE MONOHYDRATE 25 G/50ML
25 INJECTION, SOLUTION INTRAVENOUS
Status: DISCONTINUED | OUTPATIENT
Start: 2018-01-22 | End: 2018-01-26 | Stop reason: HOSPADM

## 2018-01-22 RX ORDER — SPIRONOLACTONE 100 MG/1
100 TABLET, FILM COATED ORAL DAILY
Status: DISCONTINUED | OUTPATIENT
Start: 2018-01-22 | End: 2018-01-26 | Stop reason: HOSPADM

## 2018-01-22 RX ORDER — BISACODYL 10 MG
10 SUPPOSITORY, RECTAL RECTAL
Status: DISCONTINUED | OUTPATIENT
Start: 2018-01-22 | End: 2018-01-26 | Stop reason: HOSPADM

## 2018-01-22 RX ORDER — MAGNESIUM SULFATE HEPTAHYDRATE 40 MG/ML
2 INJECTION, SOLUTION INTRAVENOUS
Status: DISCONTINUED | OUTPATIENT
Start: 2018-01-22 | End: 2018-01-22

## 2018-01-22 RX ORDER — METOPROLOL SUCCINATE 50 MG/1
50 TABLET, EXTENDED RELEASE ORAL DAILY
COMMUNITY

## 2018-01-22 RX ORDER — POLYETHYLENE GLYCOL 3350 17 G/17G
1 POWDER, FOR SOLUTION ORAL
Status: DISCONTINUED | OUTPATIENT
Start: 2018-01-22 | End: 2018-01-26 | Stop reason: HOSPADM

## 2018-01-22 RX ORDER — SODIUM CHLORIDE 9 MG/ML
2000 INJECTION, SOLUTION INTRAVENOUS ONCE
Status: COMPLETED | OUTPATIENT
Start: 2018-01-22 | End: 2018-01-22

## 2018-01-22 RX ADMIN — SODIUM CHLORIDE: 4.5 INJECTION, SOLUTION INTRAVENOUS at 09:31

## 2018-01-22 RX ADMIN — INSULIN HUMAN 7 UNITS: 100 INJECTION, SOLUTION PARENTERAL at 20:22

## 2018-01-22 RX ADMIN — PROCHLORPERAZINE EDISYLATE 10 MG: 5 INJECTION INTRAMUSCULAR; INTRAVENOUS at 11:30

## 2018-01-22 RX ADMIN — SODIUM CHLORIDE 4 UNITS/HR: 9 INJECTION, SOLUTION INTRAVENOUS at 02:37

## 2018-01-22 RX ADMIN — HALOPERIDOL LACTATE 4 MG: 5 INJECTION, SOLUTION INTRAMUSCULAR at 11:45

## 2018-01-22 RX ADMIN — SODIUM CHLORIDE 2000 ML: 9 INJECTION, SOLUTION INTRAVENOUS at 02:09

## 2018-01-22 RX ADMIN — SODIUM CHLORIDE, POTASSIUM CHLORIDE, SODIUM LACTATE AND CALCIUM CHLORIDE 2000 ML: 600; 310; 30; 20 INJECTION, SOLUTION INTRAVENOUS at 00:33

## 2018-01-22 RX ADMIN — SODIUM CHLORIDE 8 UNITS/HR: 9 INJECTION, SOLUTION INTRAVENOUS at 01:15

## 2018-01-22 RX ADMIN — STANDARDIZED SENNA CONCENTRATE AND DOCUSATE SODIUM 2 TABLET: 8.6; 5 TABLET, FILM COATED ORAL at 08:23

## 2018-01-22 RX ADMIN — SODIUM CHLORIDE 1 UNITS/HR: 9 INJECTION, SOLUTION INTRAVENOUS at 07:39

## 2018-01-22 RX ADMIN — HYDROMORPHONE HYDROCHLORIDE 1 MG: 2 INJECTION INTRAMUSCULAR; INTRAVENOUS; SUBCUTANEOUS at 19:01

## 2018-01-22 RX ADMIN — INSULIN HUMAN 14 UNITS: 100 INJECTION, SOLUTION PARENTERAL at 17:08

## 2018-01-22 RX ADMIN — METOPROLOL TARTRATE 50 MG: 50 TABLET, FILM COATED ORAL at 08:23

## 2018-01-22 RX ADMIN — INSULIN HUMAN 10 UNITS: 100 INJECTION, SUSPENSION SUBCUTANEOUS at 17:05

## 2018-01-22 RX ADMIN — POTASSIUM CHLORIDE 10 MEQ: 7.46 INJECTION, SOLUTION INTRAVENOUS at 03:08

## 2018-01-22 RX ADMIN — LISINOPRIL 10 MG: 20 TABLET ORAL at 08:24

## 2018-01-22 RX ADMIN — OMEPRAZOLE 20 MG: 20 CAPSULE, DELAYED RELEASE ORAL at 08:23

## 2018-01-22 RX ADMIN — ZIPRASIDONE MESYLATE 20 MG: 20 INJECTION, POWDER, LYOPHILIZED, FOR SOLUTION INTRAMUSCULAR at 12:15

## 2018-01-22 RX ADMIN — INSULIN HUMAN 10 UNITS: 100 INJECTION, SUSPENSION SUBCUTANEOUS at 15:06

## 2018-01-22 RX ADMIN — SODIUM CHLORIDE: 4.5 INJECTION, SOLUTION INTRAVENOUS at 15:08

## 2018-01-22 RX ADMIN — POTASSIUM CHLORIDE 10 MEQ: 7.46 INJECTION, SOLUTION INTRAVENOUS at 08:24

## 2018-01-22 RX ADMIN — SODIUM CHLORIDE: 4.5 INJECTION, SOLUTION INTRAVENOUS at 02:29

## 2018-01-22 ASSESSMENT — LIFESTYLE VARIABLES: DO YOU DRINK ALCOHOL: NO

## 2018-01-22 ASSESSMENT — PAIN SCALES - GENERAL
PAINLEVEL_OUTOF10: 0

## 2018-01-22 NOTE — ED NOTES
"Pt bib EMS from home with reports from pt father that pt has been confused x1 week and that her blood sugar has been high. Pre arrival VS: 130/80, 150, 30, 98%RA, BG- \"high\". Upon arrival pt is confused with BG- \"high\" on accucheck. ERP at bedside.  "

## 2018-01-22 NOTE — ASSESSMENT & PLAN NOTE
CT head neg, ammonia is normal   No focal deficits on exam  We'll try to obtain more information about her baseline from family  Evaluated by psychiatry   Geodon as needed  Minimize sedating agents  Frequent orientation  Slowly improving

## 2018-01-22 NOTE — PROGRESS NOTES
"Pulmonary Critical Care Progress Note        Chief Complaint: Elevated blood sugar, altered mental status.    History of Present Illness: 57 y.o. female admitted for with past   medical history significant for poorly controlled diabetes and hypertension,   who was just here in December for DKA, who left against medical advice on   December 4th for unclear reasons.  She was brought in this evening by EMS   after her father called and found her to be confused.  Apparently, she has   been altered for approximately 1 week now with elevated blood sugars.  EMS   checked her sugars and found the machine to read \"high.\"  In the emergency   department, patient was found to be tachycardic, but normotensive, dehydrated   and with a glucose of 1500 with elevated anion gap and low bicarbonate level.    She was given IV fluids and started on insulin drip and is being admitted to   the intensive care unit for critical care management.    Please note that all above history obtained by my partner Dr. Gonda.    ROS:  Respiratory: unable to perform due to the patient's inability to effectively communicate and positive shortness of breath, Cardiac: unable to perform due to the patient's inability to effectively communicate and negative, GI: positive nausea.  All other systems negative.    Interval Events:  24 hour interval history reviewed    [facility].  Confused.  1500 BS  Dropping 100 per hour.   Lactated Ringer's and 5 liter boluses.  CT head negative.   Sinus tachy.   130-150 sbp.  Right CVP:  Will check CVP's  Hx of DVT on Xarelto.   No abx.     PFSH:  No change.    Respiratory:     Pulse Oximetry: 97 %  Chest Tube Drains:          Exam: unlabored respirations, no intercostal retractions or accessory muscle use  ImagingAvailable data reviewed chest x-ray my opinion shows adequate placement of central catheter. There is no obvious infiltrates. Patient has cervical hardware stabilization in position.        Invalid input(s): " [id number]    HemoDynamics:  Pulse: (!) 131, Heart Rate (Monitored): (!) 129  Blood Pressure: 121/72, NIBP: 148/67       Exam: regular rate and rhythm  Imaging: None - Reviewed        Neuro:  GCS Total Raghavendra Coma Score: 14       Exam: oriented for age x3. At times confused. Is now taking oral intake minimally. No significant peripheral neuropathy noted. No significant cerebellar or other motor deficits.  Imaging: None - Reviewed    Fluids:  Intake/Output       01/20/18 0700 - 01/21/18 0659 (Not Admitted) 01/21/18 0700 - 01/22/18 0659 01/22/18 0700 - 01/23/18 0659      8286-1430 2988-7665 Total 5309-1237 1714-5436 Total 4545-8463 0052-4714 Total       Intake    I.V.  --  -- --  --  24 24  --  -- --    Insulin Volume -- -- -- -- 24 24 -- -- --    Total Intake -- -- -- -- 24 24 -- -- --       Output    Urine  --  -- --  --  600 600  --  -- --    Indwelling Cathether -- -- -- -- 600 600 -- -- --    Stool  --  -- --  --  -- --  --  -- --    Number of Times Stooled -- -- -- -- 0 x 0 x -- -- --    Total Output -- -- -- -- 600 600 -- -- --       Net I/O     -- -- -- -- -576 -576 -- -- --        Weight: 73.6 kg (162 lb 4.1 oz)  Recent Labs      01/21/18 2254 01/22/18   0108   SODIUM  132*  140   POTASSIUM  5.0  4.9   CHLORIDE  91*  103   CO2  16*  17*   BUN  54*  47*   CREATININE  2.50*  2.03*   MAGNESIUM   --   2.4   PHOSPHORUS   --   3.8   CALCIUM  10.2  8.8       GI/Nutrition:  Exam: abdomen is soft and non-tender  Imaging: None - Reviewed  NPO  Liver Function  Recent Labs      01/21/18 2254 01/22/18   0210  01/22/18   0253  01/22/18   0420   ALTSGPT  14   --    --    --    --    ASTSGOT  10*   --    --    --    --    ALKPHOSPHAT  146*   --    --    --    --    TBILIRUBIN  1.5   --    --    --    --    LIPASE  205*   --    --    --    --    GLUCOSE  1500*   < >  998*  840*  744*    < > = values in this interval not displayed.       Heme:  Recent Labs      01/21/18   2254   RBC  6.30*   HEMOGLOBIN  16.0    HEMATOCRIT  54.9*   PLATELETCT  274       Infectious Disease:  Temp  Av.8 °C (98.3 °F)  Min: 36.4 °C (97.6 °F)  Max: 37.4 °C (99.3 °F)  Micro: no cultures pending  Recent Labs      18   2254   WBC  13.2*   NEUTSPOLYS  82.70*   LYMPHOCYTES  6.70*   MONOCYTES  9.90   EOSINOPHILS  0.00   BASOPHILS  0.20   ASTSGOT  10*   ALTSGPT  14   ALKPHOSPHAT  146*   TBILIRUBIN  1.5     Current Facility-Administered Medications   Medication Dose Frequency Provider Last Rate Last Dose   • spironolactone (ALDACTONE) tablet 100 mg  100 mg DAILY Ashlyn Agarwal M.D.       • simvastatin (ZOCOR) tablet 20 mg  20 mg Q EVENING Ashlny Agarwal M.D.       • omeprazole (PRILOSEC) capsule 20 mg  20 mg DAILY Ashlyn Agarwal M.D.       • metoprolol (LOPRESSOR) tablet 50 mg  50 mg BID Ashlyn Agarwal M.D.       • lisinopril (PRINIVIL) 10 MG tablet 10 mg  10 mg DAILY Ashlyn Agarwal M.D.       • dextrose 10% and 0.45% NaCl infusion   Continuous Ashlyn Agarwal M.D.   Stopped at 18 0115   • MD ALERT-PHARMACY TO CONSULT FOR DKA MONITORING 1 Each  1 Each PRN Ashlyn Agarwal M.D.       • senna-docusate (PERICOLACE or SENOKOT S) 8.6-50 MG per tablet 2 Tab  2 Tab BID Ashlyn Agarwal M.D.        And   • polyethylene glycol/lytes (MIRALAX) PACKET 1 Packet  1 Packet QDAY PRN Ashlyn Agarwal M.D.        And   • magnesium hydroxide (MILK OF MAGNESIA) suspension 30 mL  30 mL QDAY PRN Ashlyn Agarwal M.D.        And   • bisacodyl (DULCOLAX) suppository 10 mg  10 mg QDAY PRN Ashlyn Agarwal M.D.       • magnesium sulfate IVPB premix 2 g  2 g Once PRN Ashlyn Agarwal M.D.        Or   • magnesium sulfate IVPB premix 4 g  4 g Once PRN Ashlyn Agarwal M.D.       • potassium phosphates 30 mmol in  mL ivpb  30 mmol Once PRN Ashlyn Agarwal M.D.        Or   • sodium phosphate 30 mmol in 1/2  mL ivpb  30 mmol Once PRN Ashlyn Agarwal M.D.       • 1/2 NS infusion   Continuous Ashlyn Agarwal M.D. 100 mL/hr at 18 0229     • D5 1/2 NS  infusion   Continuous Ashlyn Agarwal M.D.   Stopped at 01/22/18 0115   • ondansetron (ZOFRAN) syringe/vial injection 4 mg  4 mg Q4HRS PRN Ashlyn Agarwal M.D.       • ondansetron (ZOFRAN ODT) dispertab 4 mg  4 mg Q4HRS PRN Ashlyn Agarwal M.D.       • promethazine (PHENERGAN) tablet 12.5-25 mg  12.5-25 mg Q4HRS PRN Ashlyn Agarwal M.D.       • promethazine (PHENERGAN) suppository 12.5-25 mg  12.5-25 mg Q4HRS PRN Ashlyn Agarwal M.D.       • prochlorperazine (COMPAZINE) injection 5-10 mg  5-10 mg Q4HRS PRN Ashlyn Agarwal M.D.       • insulin regular human (HUMULIN/NOVOLIN R) 62.5 Units in  mL Infusion for DKA  4 Units/hr Continuous Ashlyn Agarwal M.D. 8 mL/hr at 01/22/18 0351 2 Units/hr at 01/22/18 0351   • Adult DKA potassium(K+) replacement scale  1 Each Q4HR Jeremy M Gonda, M.D.         Last reviewed on 1/21/2018 10:57 PM by Bryn Quintero R.N.    Quality  Measures:  Labs reviewed and Medications reviewed  Walls catheter: No Walls and Critically Ill - Requiring Accurate Measurement of Urinary Output  Central line in place: Sepsis      DVT prophylaxis - mechanical: SCDs  Ulcer prophylaxis: Yes      Assessment and plan:    1. Nonketotic hyperosmolar syndrome.    -Continue with the protocol for severe hyperglycemia, however at this point patient is now noted to have closed anion gap as well as improved bicarbonate.  -Since months status has been somewhat altered today, we'll hold off on oral therapy. Suggest NPH and sliding scale and set of long-acting Lantus.  -We'll check lipase, triglyceride level as well.    2.  Agitation.    -Suspect this is related to her hyperosmolar syndrome. Continue to watch for other signs of toxic metabolic encephalopathy. Geodon has been ordered and has been successful today. 40 mg per day his maximum dose however.    3. History of DVT.     -Does not appear the patient spent compliant with anticoagulation therapy.    4. Pseudohyponatremia related to severe hyperglycemia.      -Please see #1 above.    Discussed patient condition and risk of morbidity and/or mortality with Charge nurse / hot rounds, and multidisciplinary Rounds  The patient remains critically ill.  Critical care time = 35 minutes in directly providing and coordinating critical care and extensive data review.  No time overlap and excludes procedures.

## 2018-01-22 NOTE — ED NOTES
RN:     Lab called with critical result of blood glucose of 1500 at 0004. Critical lab result read back to .   Dr. Martienz notified of critical lab result at 0005.  Critical lab result read back by Dr. Martinez.

## 2018-01-22 NOTE — ASSESSMENT & PLAN NOTE
Lower extremity duplex was negative      The patient has been noncompliant with her Xarelto  Will DC anticoagulation at this time and try to obtain records from [facility] where she was supposedly diagnosed with DVT  Given her history of noncompliance the risks of anticoagulation outweigh potential benefits at this time

## 2018-01-22 NOTE — CARE PLAN
Problem: Safety  Goal: Will remain free from injury  Patient educated about use of call light and not getting out of bed without assistance. Patient verbalizes understanding however is confused at this time and requires frequent reorientation. The bed is in the lowest, locked position and the alarm is set.     Problem: Venous Thromboembolism (VTW)/Deep Vein Thrombosis (DVT) Prevention:  Goal: Patient will participate in Venous Thrombosis (VTE)/Deep Vein Thrombosis (DVT)Prevention Measures    Intervention: Ensure patient wears graduated elastic stockings (ROBB hose) and/or SCDs, if ordered, when in bed or chair (Remove at least once per shift for skin check)  Patient wearing SCDs, no pharmacologic prophylaxis ordered at this time.

## 2018-01-22 NOTE — H&P
" Hospital Medicine History and Physical    Date of Service  2018    Chief Complaint  Chief Complaint   Patient presents with   • High Blood Sugar       History of Presenting Illness  57 y.o. female who presented on 2018 with Altered mental state and elevated blood glucose levels. The patient remains confused and is unable to provide meaningful history, there are no family members at bedside to assist, therefore history is obtained in discussion with the emergency room physician as well as review of medical records. The patient has a known history of diabetes mellitus, family members reportedly stated that she has been increasingly confused over the past week and that blood sugar checks at home were reading \"high\". Upon assessment in the ER, the patient is found to be profoundly hyperglycemic with a elevated anion gap and is confused.  No additional history can be obtained from patient at this time.      Primary Care Physician  Tiago Higgisn M.D.    Consultants  None    Code Status  Full    Review of Systems  Review of Systems   Unable to perform ROS: Critical illness        Past Medical History  Past Medical History:   Diagnosis Date   • DM (diabetes mellitus) (CMS-HCC)    • HTN (hypertension)        Surgical History  Past Surgical History:   Procedure Laterality Date   • APPENDECTOMY     • CHOLECYSTECTOMY     • HCHG  DELIVERY SER     • HYSTERECTOMY, TOTAL ABDOMINAL     • LAMINOTOMY     • OTHER      back, neck, shoulder surgeries   • OTHER ORTHOPEDIC SURGERY         Medications  No current facility-administered medications on file prior to encounter.      Current Outpatient Prescriptions on File Prior to Encounter   Medication Sig Dispense Refill   • gabapentin (NEURONTIN) 300 MG Cap TK 1 C TID  8   • metformin (GLUCOPHAGE) 500 MG Tab Take 500 mg by mouth 2 times a day, with meals.     • duloxetine (CYMBALTA) 30 MG Cap DR Particles Take 30 mg by mouth every bedtime.     • lisinopril (PRINIVIL) " 10 MG Tab Take 10 mg by mouth every day.     • omeprazole (PRILOSEC) 20 MG CPDR Take 20 mg by mouth every day.     • metoprolol (LOPRESSOR) 50 MG TABS Take 50 mg by mouth 2 times a day.     • clonazepam (KLONOPIN) 1 MG TABS Take 1 mg by mouth 3 times a day.     • hydrOXYzine (ATARAX) 50 MG TABS Take 150 mg by mouth every bedtime.     • spironolactone (ALDACTONE) 100 MG TABS Take 100 mg by mouth every day.     • glipiZIDE SR (GLUCOTROL) 2.5 MG TB24 Take 2.5 mg by mouth every day.     • simvastatin (ZOCOR) 20 MG TABS Take 20 mg by mouth every evening.         Family History  Family History   Problem Relation Age of Onset   • Diabetes Mother    • Heart Disease Father    • Cancer Father    • Diabetes Sister        Social History  Social History   Substance Use Topics   • Smoking status: Current Every Day Smoker     Packs/day: 1.00   • Smokeless tobacco: Never Used   • Alcohol use No       Allergies  Allergies   Allergen Reactions   • Sulfa Drugs Nausea     N/V nervousness        Physical Exam  Laboratory   Hemodynamics  Temp (24hrs), Av.4 °C (99.3 °F), Min:37.4 °C (99.3 °F), Max:37.4 °C (99.3 °F)   Temperature: 37.4 °C (99.3 °F)  Pulse  Av  Min: 144  Max: 150 Heart Rate (Monitored): (!) 144  Blood Pressure: 121/72, NIBP: 123/62      Respiratory      Respiration: (!) 27, Pulse Oximetry: 97 %             Physical Exam   Constitutional: No distress.   HENT:   Head: Normocephalic and atraumatic.   Right Ear: External ear normal.   Left Ear: External ear normal.   Very poor dentition, dry mucous membranes   Eyes: EOM are normal. Right eye exhibits no discharge. Left eye exhibits no discharge.   Neck: Neck supple. No JVD present.   Cardiovascular: Normal rate, regular rhythm and normal heart sounds.    Pulmonary/Chest: Effort normal and breath sounds normal. No respiratory distress. She exhibits no tenderness.   Abdominal: Soft. Bowel sounds are normal. She exhibits no distension. There is no tenderness.    Musculoskeletal: Normal range of motion. She exhibits no edema.   Neurological: She is alert. No cranial nerve deficit.   Alert but confused and a non-historian at this point, able to follow commands and moving all 4 extremities spontaneously   Skin: Skin is warm and dry. She is not diaphoretic. No erythema.   Nursing note and vitals reviewed.    Capillary refill less than 3 seconds, distal pulses intact    Recent Labs      01/21/18   2254   WBC  13.2*   RBC  6.30*   HEMOGLOBIN  16.0   HEMATOCRIT  54.9*   MCV  87.1   MCH  25.4*   MCHC  29.1*   RDW  46.0   PLATELETCT  274   MPV  11.6     Recent Labs      01/21/18   2254   SODIUM  132*   POTASSIUM  5.0   CHLORIDE  91*   CO2  16*   GLUCOSE  1500*   BUN  54*   CREATININE  2.50*   CALCIUM  10.2     Recent Labs      01/21/18   2254   ALTSGPT  14   ASTSGOT  10*   ALKPHOSPHAT  146*   TBILIRUBIN  1.5   LIPASE  205*   GLUCOSE  1500*                 Lab Results   Component Value Date    TROPONINI <0.01 12/03/2017       Imaging  Ct-head W/o    Result Date: 1/21/2018 1/21/2018 11:20 PM HISTORY/REASON FOR EXAM:  Altered Mental Status. Confusion for one week. Hyperglycemia TECHNIQUE/EXAM DESCRIPTION AND NUMBER OF VIEWS: CT of the head without contrast. The study was performed on a helical multidetector CT scanner. Contiguous 2.5 mm axial sections were obtained from the skull base through the vertex. Up to date radiation dose reduction adjustments have been utilized to meet ALARA standards for radiation dose reduction. COMPARISON:  None available FINDINGS: The calvariae and skull base are unremarkable. There are no extraaxial fluid collections. The ventricular system and basal cisterns are within normal limits. There are no areas of abnormal density in the brain substance. There are no hemorrhagic lesions.  There are no mass effects or shift of midline structures. The brainstem and posterior fossa structures are unremarkable. Paranasal sinuses in the field of view are  unremarkable. Mastoids in the field of view are unremarkable.     1.  Head CT without contrast within normal limits. No evidence of acute cerebral infarction, hemorrhage or mass lesion.    Dx-chest-limited (1 View)    Result Date: 1/22/2018 1/22/2018 12:34 AM HISTORY/REASON FOR EXAM:  Central line placement TECHNIQUE/EXAM DESCRIPTION AND NUMBER OF VIEWS: Single portable view of the chest. COMPARISON: 4/26/2013 FINDINGS: There is a right IJ catheter with the tip projecting over the SVC. The mediastinal and cardiac silhouette is unremarkable. The pulmonary vascularity is within normal limits. The lung parenchyma is clear. There is no significant pleural effusion. There is no visible pneumothorax. There are no acute bony abnormalities. There are postoperative changes in the cervical spine.     1.  No acute cardiac or pulmonary abnormalities are identified. 2.  Satisfactory appearance of the right IJ catheter. There is no visible pneumothorax.     Assessment/Plan     Anticipate that patient will needGreater than 2 midnights for management of the discussed medical issues.    This dictation was created using voice recognition software. The accuracy of the dictation is limited to the abilities of the software. I expect there may be some errors of grammar and possibly content.    * DKA (diabetic ketoacidoses) (CMS-[facility])   Assessment & Plan    The patient will be admitted to the ICU for continuous hemodynamic monitoring on the DKA protocol. She has received 2 L of IV fluids and I will add an additional 2 L. At that point, I will transition her to maintenance dose. Patient will be started on an insulin drip and we will check every 4 hour chemistries to ensure closure of her gap and correction of her hyperglycemia. I'll correct electrolytes. She'll be kept nothing by mouth for now.        Metabolic encephalopathy   Assessment & Plan    Patient remains confused but reportedly has improved somewhat in the emergency room with her  initial fluid boluses. CT of the head was negative for acute intracranial pathology. I will continue to correct her electrolyte imbalances and profound hyperglycemia as noted above. I will withhold hold sedating medications for now. Patient also has leukocytosis and tachycardia but I suspect that this is reactive to her dehydration and hyperglycemia. Her urinalysis is negative, her chest x-ray is negative, she is afebrile. I'm holding off on antibiotic therapies at this point.        Acute kidney injury superimposed on chronic kidney disease (CMS-HCC)- (present on admission)   Assessment & Plan    Secondary to dehydration in the setting of profound hyperglycemia. Expect improvement with IV fluids as noted above, we will continue to monitor renal function.        Hyperlipidemia- (present on admission)   Assessment & Plan    This is chronic, continue home Zocor.        Hypertension- (present on admission)   Assessment & Plan    Blood pressure is currently stable, continue home metoprolol and Prinivil.        History of DVT (deep vein thrombosis)- (present on admission)   Assessment & Plan    Discussed with pharmacy, review of the medical records indicates that the patient had been diagnosed with DVT in the recent past, she was to be on xarelto but apparently had not been able to afford this medication. Also, her primary care provider made attempts to contact her to arrange for an alternative prescription but the patient was lost to follow-up. I will check a new ultrasound of the lower extremities and if this is positive, then I will plan to start her on weight-based anticoagulation.          Prophylaxis: Sequential compression devices for DVT prophylaxis, stress dose PPI indicated, stool softeners ordered     Patient is critically ill, greater than 36 minutes of critical care time were spent in the admission, planning, and management of this patient not including procedures and without overlap.

## 2018-01-22 NOTE — ED PROVIDER NOTES
"ED Provider Note    CHIEF COMPLAINT  Chief Complaint   Patient presents with   • High Blood Sugar       HPI  Vin Maciel is a 57 y.o. female who presents to the emergency department in an altered state. According to EMS they picked the patient up from her home. She was there with family who states been more altered over the last week. The patient is not answer questions appropriately and therefore cannot obtain any further information. According to her records she does have a history of diabetes mellitus with poorly controlled blood sugars.    REVIEW OF SYSTEMS  See HPI for further details. All other systems are negative.     PAST MEDICAL HISTORY  Past Medical History:   Diagnosis Date   • DM (diabetes mellitus) (CMS-[facility])    • HTN (hypertension)        SOCIAL HISTORY  Social History     Social History   • Marital status:      Spouse name: N/A   • Number of children: N/A   • Years of education: N/A     Social History Main Topics   • Smoking status: Current Every Day Smoker     Packs/day: 1.00   • Smokeless tobacco: Never Used   • Alcohol use No   • Drug use: No   • Sexual activity: Not on file     Other Topics Concern   • Not on file     Social History Narrative   • No narrative on file           PHYSICAL EXAM  VITAL SIGNS: /72   Pulse (!) 150   Temp 37.4 °C (99.3 °F)   Resp (!) 25   Ht 1.6 m (5' 3\")   Wt 75 kg (165 lb 5.5 oz)   SpO2 98%   BMI 29.29 kg/m²   Constitutional: The patient appears ill.   HENT: Normocephalic, Atraumatic, tympanic membranes are intact and nonerythematous bilaterally, Oropharynx dry without exudates or erythema, Nose normal.   Eyes: PERRLA, EOMI, Conjunctiva normal.  Neck: Supple without meningismus  Lymphatic: No lymphadenopathy noted.   Cardiovascular: Tachycardic heart rate, Normal rhythm, No murmurs, No rubs, No gallops.   Thorax & Lungs: Symmetrically diminished throughout, No respiratory distress, No wheezing, No chest tenderness.   Abdomen: Diffuse tenderness to " deep palpation, no rebound, no guarding, normal bowel sounds  Skin: Warm, Dry, No erythema, No rash.   Back: No tenderness, No CVA tenderness.   Extremities: Atraumatic with symmetric distal pulses, No edema, No tenderness, No cyanosis, No clubbing.   Neurologic: Alert but not oriented, the patient opens her eyes spontaneously, she does speak or answer questions appropriately, she has move all 4 extremities to command.    RADIOLOGY/  DX-CHEST-LIMITED (1 VIEW)   Final Result      1.  No acute cardiac or pulmonary abnormalities are identified.   2.  Satisfactory appearance of the right IJ catheter. There is no visible pneumothorax.      CT-HEAD W/O   Final Result      1.  Head CT without contrast within normal limits. No evidence of acute cerebral infarction, hemorrhage or mass lesion.        PROCEDURES Central line placement  Central Line Placement Procedure Note  Indication: poor peripheral access    Consent: Unable to be obtained due to the emergent nature of this procedure.    Procedure: The patient was positioned appropriately and the skin over the right internal jugular vein was prepped with betadine and draped in a sterile fashion. Local anesthesia was obtained by infiltration using 1% Lidocaine without epinephrine.  A large bore needle was used to identify the vein.  A guide wire was then inserted into the vein through the needle. A triple lumen catheter was then inserted into the vessel over the guide wire using the Seldinger technique.  All ports showed good, free flowing blood return and were flushed with saline solution.  The catheter was then securely fastened to the skin with sutures and covered with a sterile dressing.  A post procedure X-ray was ordered and showed good line position.    The patient tolerated the procedure well.    Complications: None      COURSE & MEDICAL DECISION MAKING  Pertinent Labs & Imaging studies reviewed. (See chart for details)  This a 57-year-old female who presents in an  altered state. On arrival her blood sugars did read high and I was concerned for diabetic ketoacidosis. Therefore the patient was aggressively treated with intravenous hydration. The patient required a central line and she had poor peripheral access. After the central line is placed she continued to receive intravenous resuscitation as well as an insulin drip as her blood work does show significant elevation in her blood sugars. I suspect the patient was in an early hyperosmolar coma state. CT scan of the head does not show any evidence of intracranial abnormality. The patient has been rechecked multiple times and her mentation is slowly improving with resuscitation. The patient will be admitted to the ICU in guarded condition. At this time I do not see any evidence of an infectious source. 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 The patient's renal insufficiency is most likely prerenal.    FINAL IMPRESSION  1. DKA   2. Volume depletion  3. Renal insufficiency  4. Central line placement  5. Critical care time 30 minutes despite procedures     Disposition  The patient will be admitted in guarded condition    Electronically signed by: Thanh Martinez, 1/21/2018 10:52 PM

## 2018-01-22 NOTE — PROGRESS NOTES
Patient is agitated and continues to be alert only to self. Patient pulled out right IJ central line this am. Wrist restraints placed per MD order. Bed alarm remains on. Room close to nurses station. Will continue to monitor closely.

## 2018-01-22 NOTE — PROGRESS NOTES
Deb from Lab called with critical result of glucose at 998. Critical lab result read back to Deb.   Patient on DKA protocol, MD notification not necessary due to FSBG trending down.

## 2018-01-22 NOTE — PROGRESS NOTES
12 hour chart check    59 yr old with advanced dememtia,bedridden who was recently admitted for and treated with Antibiotics for UTI was brought in for new permacath dysfunction and was sseen with

## 2018-01-22 NOTE — ASSESSMENT & PLAN NOTE
Secondary to dehydration in the setting of profound hyperglycemia. Expect improvement with IV fluids as noted above, we will continue to monitor renal function.

## 2018-01-22 NOTE — PROGRESS NOTES
Patient transported via gurney by RN and tech to R109 on a monitor without any complications. Patient is confused at this time and has no complaints of pain. Patient oriented to room and surroundings.

## 2018-01-22 NOTE — CONSULTS
"DATE OF SERVICE:  2018    REQUESTING PHYSICIAN:  Ashlyn Agarwal MD    REASON FOR CONSULTATION:  Diabetic ketoacidosis and altered mental status.    HISTORY OF PRESENT ILLNESS:  The patient is a 57-year-old female with past   medical history significant for poorly controlled diabetes and hypertension,   who was just here in December for DKA, who left against medical advice on    for unclear reasons.  She was brought in this evening by EMS   after her father called and found her to be confused.  Apparently, she has   been altered for approximately 1 week now with elevated blood sugars.  EMS   checked her sugars and found the machine to read \"high.\"  In the emergency   department, patient was found to be tachycardic, but normotensive, dehydrated   and with a glucose of 1500 with elevated anion gap and low bicarbonate level.    She was given IV fluids and started on insulin drip and is being admitted to   the intensive care unit for critical care management.    PAST MEDICAL HISTORY:  1.  Poorly controlled insulin-dependent diabetes.  2.  Systemic arterial hypertension.  3.  Deep vein thrombosis on anticoagulation.  4.  Stage III chronic kidney disease.    PAST SURGICAL HISTORY:  Includes appendectomy, cholecystectomy, ,   total abdominal hysterectomy, laminotomy.    ALLERGIES:  SULFA.    SOCIAL HISTORY:  Remarkable for tobacco use, smoking 1 pack per day, but in   the past, and this admission has denied.  Denied alcohol and illicit drug use.    FAMILY HISTORY:  Remarkable for diabetes, coronary arterial disease and   cancer.    OUTPATIENT MEDICATIONS:  At the time of discharge on 2017 include   gabapentin, metformin, Cymbalta, lisinopril, omeprazole, metoprolol,   clonazepam, hydroxyzine, spironolactone, glipizide and simvastatin.    REVIEW OF SYSTEMS:  Please see history present illness, otherwise unable to   obtain additional given patient's current clinical condition (confusion).  "   Patient at the time of my evaluation is denied any abdominal pain, nausea,   vomiting, diarrhea, constipation, fevers, chills, headache, stiff neck, sore   throat.    PHYSICAL EXAMINATION:  VITAL SIGNS:  Temperature 37.4 degrees Celsius, heart rate of 142, respiratory   rate initially 30, now down to 22, oxygen saturation of 97% on room air, and   blood pressure 136/80.  GENERAL:  Well-developed, well-nourished female, pleasantly confused.  HEENT:  Normocephalic, atraumatic.  Pupils are 3 mm and reactive bilaterally   without scleral icterus or conjunctival pallor.  Dry oral mucosal membranes   with poor dentition.  No oropharyngeal erythema or ulcerations.  She has   alopecia.  NECK:  Supple.  Trachea is midline.  There is no stridor or jugular venous   distention.  No meningismus.  CARDIOVASCULAR:  Tachycardic without murmurs, rubs or gallops and nondisplaced   PMI.  Radial pulses are 2+ and symmetric with brisk capillary refill.  PULMONARY:  Clear to auscultation bilaterally without wheezing, rhonchi, or   rales.  Tachypneic, but not using accessory muscles, unable to speak in full   sentences.  ABDOMEN:  Soft, nontender, nondistended.  Normal bowel sounds.  GENITOURINARY:  Normal external female genitalia.  EXTREMITIES:  No clubbing or cyanosis.  Trace lower extremity edema.  No calf   asymmetry or palpable cord.  NEUROLOGIC:  Patient is awake, pleasant, looks around the room, makes eye   contact and can follow commands without focal deficits, but is confused to   place, event, time and is only oriented to self.  She answers questions   inappropriately with some repetitiveness.  Cranial nerves II-XII are intact;   however, without focal deficits.  SKIN:  Cool, pale, and dry without lesions or rash.  Brisk capillary refill.    LABORATORY DATA:  CBC with a white count of 13,000, hemoglobin 16, platelets   of 274 with a left shift on the differential.  Chemistry:  Sodium 132,   potassium of 5, chloride of 91,  bicarbonate 16, BUN of 54, creatinine 2.5 with   a glucose of 1500, calcium of 10.2.  AST 10, ALT 14, alkaline phosphatase   146, total bilirubin 1.5, albumin of 4.8, phosphorus 3.8, magnesium of 2.4,   lipase of 205.  Urinalysis showing greater than 1000 glucose, 15 ketones,   negative nitrite, negative leukocyte esterase, 0-2 white cells, negative   bacteria.    IMAGIN.  CT head without contrast showing no acute intracranial process.  2.  Chest x-ray showing no acute cardiopulmonary process with a right IJ   central venous catheter without pneumothorax and cervical hardware noted.    ASSESSMENT:  1.  Poorly controlled insulin-dependent diabetes with recurrent diabetic   ketoacidosis.  2.  Severe intravascular volume depletion.  3.  Hypovolemic hyponatremia with psuedohyponatremia component.  4.  Acute on chronic kidney disease.  5.  Hemoconcentration secondary to dehydration.  6.  Leukocytosis, likely reactive, no signs of infection at this point.  7.  Anion gap metabolic acidosis.  8.  Acute metabolic/toxic encephalopathy.  9.  History of deep venous thrombosis, unknown whether compliant with   anticoagulant.  10.  Limited database.    PLAN:  Patient is critically ill at this time and will be admitted to the   intensive care unit for close observation and management.  I will continue her   on the DKA protocol with ongoing aggressive fluid resuscitation and insulin   drip targeting closure of anion gap normalization of glucose and serum   bicarbonate level before transitioning to short and long-acting insulin.  She   will be kept n.p.o. in the meantime.  I will monitor and replace her   electrolytes closely.  Her degree of altered mentation is somewhat concerning   and abnormal for just DKA and thus warrants further workup.  I will order an   ammonia level, urine drug screen and alcohol level.  Should she not show   improvement in her mentation as her DKA resolves, she will likely need further   imaging  including possible MRI of her brain and/or EEG.  Some of her   medication she takes may also be causing the altered mentation, especially if   she inadvertently or purposefully overdosed on them.  I will monitor her   kidney function closely as well as her urine output.  There is no sign of   infection at this time and will hold off on empiric antibiotics.  Urine   pregnancy will be checked as well.  Patient is critically ill at this time and   I appreciate the opportunity to assist in her care and I will continue to   follow along closely with you.    Critical care time 32 minutes.  No time overlap.  Procedures are not included   in this time.    [id number].       ____________________________________     Jeremy Gonda, MD JG / [facility]IE    DD:  01/22/2018 03:13:01  DT:  01/22/2018 03:50:06    D#:  [id number]  Job#:  [id number]

## 2018-01-22 NOTE — PROGRESS NOTES
Ms. Maciel was admitted by Dr. Agarwal this morning with DKA.  Case discussed with Dr. Dunne.   Patient seen.

## 2018-01-22 NOTE — PROGRESS NOTES
Deb from Lab called with critical result of Glucose at 840. Critical lab result read back to Deb.   Dr. Gonda notified of critical lab result at 0353.  DKA protocol followed.

## 2018-01-23 PROBLEM — N18.9 ACUTE KIDNEY INJURY SUPERIMPOSED ON CHRONIC KIDNEY DISEASE (HCC): Status: RESOLVED | Noted: 2017-12-04 | Resolved: 2018-01-23

## 2018-01-23 PROBLEM — N17.9 ACUTE KIDNEY INJURY SUPERIMPOSED ON CHRONIC KIDNEY DISEASE (HCC): Status: RESOLVED | Noted: 2017-12-04 | Resolved: 2018-01-23

## 2018-01-23 LAB
ALBUMIN SERPL BCP-MCNC: 2.9 G/DL (ref 3.2–4.9)
ALBUMIN/GLOB SERPL: 1 G/DL
ALP SERPL-CCNC: 101 U/L (ref 30–99)
ALT SERPL-CCNC: 10 U/L (ref 2–50)
ANION GAP SERPL CALC-SCNC: 9 MMOL/L (ref 0–11.9)
AST SERPL-CCNC: 22 U/L (ref 12–45)
BILIRUB SERPL-MCNC: 1.1 MG/DL (ref 0.1–1.5)
BUN SERPL-MCNC: 30 MG/DL (ref 8–22)
CALCIUM SERPL-MCNC: 8.2 MG/DL (ref 8.5–10.5)
CHLORIDE SERPL-SCNC: 117 MMOL/L (ref 96–112)
CO2 SERPL-SCNC: 25 MMOL/L (ref 20–33)
CREAT SERPL-MCNC: 1.05 MG/DL (ref 0.5–1.4)
ERYTHROCYTE [DISTWIDTH] IN BLOOD BY AUTOMATED COUNT: 41.1 FL (ref 35.9–50)
GLOBULIN SER CALC-MCNC: 2.8 G/DL (ref 1.9–3.5)
GLUCOSE BLD-MCNC: 185 MG/DL (ref 65–99)
GLUCOSE BLD-MCNC: 195 MG/DL (ref 65–99)
GLUCOSE BLD-MCNC: 210 MG/DL (ref 65–99)
GLUCOSE BLD-MCNC: 229 MG/DL (ref 65–99)
GLUCOSE SERPL-MCNC: 179 MG/DL (ref 65–99)
HCT VFR BLD AUTO: 40.7 % (ref 37–47)
HGB BLD-MCNC: 12.9 G/DL (ref 12–16)
LIPASE SERPL-CCNC: 407 U/L (ref 11–82)
MCH RBC QN AUTO: 25.2 PG (ref 27–33)
MCHC RBC AUTO-ENTMCNC: 31.7 G/DL (ref 33.6–35)
MCV RBC AUTO: 79.5 FL (ref 81.4–97.8)
PLATELET # BLD AUTO: 139 K/UL (ref 164–446)
PMV BLD AUTO: 11.8 FL (ref 9–12.9)
POTASSIUM SERPL-SCNC: 3.8 MMOL/L (ref 3.6–5.5)
PROT SERPL-MCNC: 5.7 G/DL (ref 6–8.2)
RBC # BLD AUTO: 5.12 M/UL (ref 4.2–5.4)
SODIUM SERPL-SCNC: 151 MMOL/L (ref 135–145)
TRIGL SERPL-MCNC: 220 MG/DL (ref 0–149)
WBC # BLD AUTO: 11.3 K/UL (ref 4.8–10.8)

## 2018-01-23 PROCEDURE — 99233 SBSQ HOSP IP/OBS HIGH 50: CPT | Performed by: HOSPITALIST

## 2018-01-23 PROCEDURE — 83690 ASSAY OF LIPASE: CPT

## 2018-01-23 PROCEDURE — 700111 HCHG RX REV CODE 636 W/ 250 OVERRIDE (IP): Performed by: INTERNAL MEDICINE

## 2018-01-23 PROCEDURE — 80053 COMPREHEN METABOLIC PANEL: CPT

## 2018-01-23 PROCEDURE — 700102 HCHG RX REV CODE 250 W/ 637 OVERRIDE(OP): Performed by: HOSPITALIST

## 2018-01-23 PROCEDURE — 93970 EXTREMITY STUDY: CPT

## 2018-01-23 PROCEDURE — A9270 NON-COVERED ITEM OR SERVICE: HCPCS | Performed by: HOSPITALIST

## 2018-01-23 PROCEDURE — 93970 EXTREMITY STUDY: CPT | Mod: 26 | Performed by: SURGERY

## 2018-01-23 PROCEDURE — 770001 HCHG ROOM/CARE - MED/SURG/GYN PRIV*

## 2018-01-23 PROCEDURE — 700105 HCHG RX REV CODE 258: Performed by: HOSPITALIST

## 2018-01-23 PROCEDURE — 85027 COMPLETE CBC AUTOMATED: CPT

## 2018-01-23 PROCEDURE — 82962 GLUCOSE BLOOD TEST: CPT

## 2018-01-23 PROCEDURE — 84478 ASSAY OF TRIGLYCERIDES: CPT

## 2018-01-23 RX ORDER — CLONAZEPAM 1 MG/1
1 TABLET ORAL 3 TIMES DAILY
Status: DISCONTINUED | OUTPATIENT
Start: 2018-01-23 | End: 2018-01-25

## 2018-01-23 RX ORDER — ZIPRASIDONE MESYLATE 20 MG/ML
20 INJECTION, POWDER, LYOPHILIZED, FOR SOLUTION INTRAMUSCULAR EVERY 4 HOURS PRN
Status: DISCONTINUED | OUTPATIENT
Start: 2018-01-23 | End: 2018-01-26 | Stop reason: HOSPADM

## 2018-01-23 RX ORDER — DULOXETIN HYDROCHLORIDE 30 MG/1
30 CAPSULE, DELAYED RELEASE ORAL 2 TIMES DAILY
Status: DISCONTINUED | OUTPATIENT
Start: 2018-01-23 | End: 2018-01-26 | Stop reason: HOSPADM

## 2018-01-23 RX ORDER — CALCIUM CHLORIDE 100 MG/ML
1 INJECTION INTRAVENOUS; INTRAVENTRICULAR ONCE
Status: DISCONTINUED | OUTPATIENT
Start: 2018-01-23 | End: 2018-01-23

## 2018-01-23 RX ORDER — ZIPRASIDONE MESYLATE 20 MG/ML
10 INJECTION, POWDER, LYOPHILIZED, FOR SOLUTION INTRAMUSCULAR EVERY 4 HOURS PRN
Status: DISCONTINUED | OUTPATIENT
Start: 2018-01-23 | End: 2018-01-23

## 2018-01-23 RX ORDER — DEXTROSE MONOHYDRATE 25 G/50ML
25 INJECTION, SOLUTION INTRAVENOUS ONCE
Status: DISCONTINUED | OUTPATIENT
Start: 2018-01-23 | End: 2018-01-23

## 2018-01-23 RX ADMIN — INSULIN HUMAN 4 UNITS: 100 INJECTION, SOLUTION PARENTERAL at 06:17

## 2018-01-23 RX ADMIN — ZIPRASIDONE MESYLATE 10 MG: 20 INJECTION, POWDER, LYOPHILIZED, FOR SOLUTION INTRAMUSCULAR at 12:21

## 2018-01-23 RX ADMIN — SODIUM CHLORIDE: 4.5 INJECTION, SOLUTION INTRAVENOUS at 11:30

## 2018-01-23 RX ADMIN — METOPROLOL TARTRATE 50 MG: 50 TABLET, FILM COATED ORAL at 09:10

## 2018-01-23 RX ADMIN — SODIUM CHLORIDE: 4.5 INJECTION, SOLUTION INTRAVENOUS at 01:24

## 2018-01-23 RX ADMIN — SPIRONOLACTONE 100 MG: 100 TABLET, FILM COATED ORAL at 09:10

## 2018-01-23 RX ADMIN — LISINOPRIL 10 MG: 20 TABLET ORAL at 09:10

## 2018-01-23 RX ADMIN — CLONAZEPAM 1 MG: 1 TABLET ORAL at 20:35

## 2018-01-23 RX ADMIN — INSULIN HUMAN 20 UNITS: 100 INJECTION, SUSPENSION SUBCUTANEOUS at 18:35

## 2018-01-23 RX ADMIN — INSULIN HUMAN 3 UNITS: 100 INJECTION, SOLUTION PARENTERAL at 20:31

## 2018-01-23 RX ADMIN — INSULIN HUMAN 10 UNITS: 100 INJECTION, SUSPENSION SUBCUTANEOUS at 06:16

## 2018-01-23 RX ADMIN — INSULIN HUMAN 4 UNITS: 100 INJECTION, SOLUTION PARENTERAL at 12:43

## 2018-01-23 RX ADMIN — OMEPRAZOLE 20 MG: 20 CAPSULE, DELAYED RELEASE ORAL at 09:11

## 2018-01-23 RX ADMIN — ZIPRASIDONE MESYLATE 10 MG: 20 INJECTION, POWDER, LYOPHILIZED, FOR SOLUTION INTRAMUSCULAR at 12:11

## 2018-01-23 RX ADMIN — INSULIN HUMAN 10 UNITS: 100 INJECTION, SUSPENSION SUBCUTANEOUS at 12:44

## 2018-01-23 RX ADMIN — METOPROLOL TARTRATE 50 MG: 50 TABLET, FILM COATED ORAL at 20:35

## 2018-01-23 RX ADMIN — INSULIN HUMAN 3 UNITS: 100 INJECTION, SOLUTION PARENTERAL at 18:33

## 2018-01-23 RX ADMIN — SIMVASTATIN 20 MG: 20 TABLET, FILM COATED ORAL at 20:35

## 2018-01-23 ASSESSMENT — PAIN SCALES - GENERAL
PAINLEVEL_OUTOF10: 0

## 2018-01-23 NOTE — WOUND TEAM
"[facility]  Inpatient Services  Initial Wound & Skin Care Evaluation    Admission Date:  1/21/2018   HPI, PMH, SH: Reviewed  Unit where seen by Wound Team: [id number]    WOUND CONSULT RELATED TO: pressure injury mgmt    SUBJECTIVE:  \"Okay.\"     Self Report / Pain Level: no c/o pain      OBJECTIVE:on REBEKAH bed, difficult to get pt to turn, confused  WOUND TYPE, LOCATION, CHARACTERISTICS (Pressure ulcers: location, stage, POA or date identified)  Pressure Ulcer  Stage 3 POA Coccyx;Sacrum  Periwound Skin: Discolored  Drainage : None       Tissue Type and %:    Scar with surrounding DTI  Wound Edges:    attached    Odor:     None   Exposed structure(s):   No   Signs and Symptoms of Infection: none    Measurements: taken 1/22/18  Length (cm): 2  Width (cm): 3  Depth (cm):  (nm scar tissue with DTI)      Tracts/undermining:   None     INTERVENTIONS BY WOUND TEAM:turned pt to L side. Picture, taken and cleaned wound with nS, dried and applied a mepilex drsg.   Dressing Options: Mepilex    Interdisciplinary consultation:   With Nursing;With Patient    EVALUATION:pt has a POA almost resolved St 3-4 pressure injury r/t scar tissue and skin around this is DTI.   Factors affecting wound healing: DM, HTN, CKD  Goals: continued resolution of resolving St 3 pressure injury and maintain intact skin until DTI reveals      NURSING PLAN OF CARE ORDERS (X):    Dressing changes: See Dressing Maintenance orders:   Skin care: See Skin Care orders: x  Rectal tube care: See Rectal Tube Care orders:   Other orders:    RSKIN: CURRENT (X) ORDERED (O)  Q shift Jose:  X  Q shift pressure point assessments:  X  Pressure redistribution mattress        REBEKAH   x   Bariatric REBEKAH      Bariatric foam        Heel float boots       Heels floated on pillows      Barrier wipes      Barrier Cream      Barrier paste      Sacral silicone dressing    placed  Padded O2 tubing      Anchorfast      Trach with Optifoam split foam       Waffle cushion   "    Rectal tube or BMS      Antifungal tx    Turn q 2 hours x  Up to chair  Ambulate   PT/OT     Dietician      PO     TF   TPN     PVN    NPO 0.5  # days   Other       WOUND TEAM PLAN OF CARE (X):   NPWT change 3 x week:        Dressing changes by wound team:       Follow up as needed:  X if wound worsens     Other (explain):    Anticipated discharge plans (X):  SNF:           Home Care:           Outpatient Wound Center:            Self Care:            Other:  tbd

## 2018-01-23 NOTE — PROGRESS NOTES
Pt confused becoming aggitated and angry. Pt found with IV's pulled out by pt. Pt reoriented to surroundings and event. Pt remains confused.

## 2018-01-23 NOTE — PROGRESS NOTES
"Pulmonary Critical Care Progress Note        Chief Complaint: Elevated blood sugar, altered mental status.    History of Present Illness: 57 y.o. female admitted for with past   medical history significant for poorly controlled diabetes and hypertension,   who was just here in December for DKA, who left against medical advice on   December 4th for unclear reasons.  She was brought in this evening by EMS   after her father called and found her to be confused.  Apparently, she has   been altered for approximately 1 week now with elevated blood sugars.  EMS   checked her sugars and found the machine to read \"high.\"  In the emergency   department, patient was found to be tachycardic, but normotensive, dehydrated   and with a glucose of 1500 with elevated anion gap and low bicarbonate level.    She was given IV fluids and started on insulin drip and is being admitted to   the intensive care unit for critical care management.    Please note that all above history obtained by my partner Dr. Gonda.    ROS:  Respiratory: unable to perform due to the patient's inability to effectively communicate and positive shortness of breath, Cardiac: unable to perform due to the patient's inability to effectively communicate and negative, GI: positive nausea.  All other systems negative.    Interval Events:  24 hour interval history reviewed    Off insulin protocol  Stage 3 wound on sacrum   involved  Psychiatric meds  Klonopin, cymbalta  Start nph 20 bid.   Leg doppler negative.   lovenox today.     PFSH:  No change.    Respiratory:     Pulse Oximetry: 93 %  Chest Tube Drains:          Exam: unlabored respirations, no intercostal retractions or accessory muscle use  ImagingAvailable data reviewed no new chest x-ray today.        Invalid input(s): [id number]    HemoDynamics:  Pulse: (!) 104, Heart Rate (Monitored): (!) 104  NIBP: 125/60       Exam: regular rate and rhythm  Imaging: None - Reviewed        Neuro:  GCS " Total Raghavendra Coma Score: 14       Exam: oriented for age x3. Patient has been agitated during the day today. Patient has received Geodon and Ativan. She has no obvious focal deficits on exam. Psychiatric consultation has been ordered.   Imaging: None - Reviewed    Fluids:  Intake/Output       01/21/18 0700 - 01/22/18 0659 01/22/18 0700 - 01/23/18 0659 01/23/18 0700 - 01/24/18 0659      0700-1859 4357-5885 Total 0700-1859 1900-0659 Total 8322-6191 9546-4221 Total       Intake    P.O.  --  -- --  240  -- 240  --  -- --    P.O. -- -- -- 240 -- 240 -- -- --    I.V.  --  2624 2624  1854.4  600 2454.4  --  -- --    Insulin Volume -- 24 24 54.4 -- 54.4 -- -- --    IV Volume (Bolus) -- 2000 2000 -- -- -- -- -- --    IV Volume (NS) --  600 2400 -- -- --    Total Intake -- 2624 2624 2094.4 600 2694.4 -- -- --       Output    Urine  --  1700 1700  960  300 1260  --  -- --    Indwelling Cathether -- 1700 1700  -- -- --    Stool  --  -- --  --  -- --  --  -- --    Number of Times Stooled -- 0 x 0 x 0 x 0 x 0 x -- -- --    Total Output -- 1700 1700  -- -- --       Net I/O     -- .4 300 1434.4 -- -- --           Recent Labs      01/22/18   0108   01/22/18   0839  01/22/18   1308  01/23/18   0448   SODIUM  140   < >  147*  147*  151*   POTASSIUM  4.9   < >  4.1  4.0  3.8   CHLORIDE  103   < >  114*  114*  117*   CO2  17*   < >  27  22  25   BUN  47*   < >  35*  35*  30*   CREATININE  2.03*   < >  1.33  1.31  1.05   MAGNESIUM  2.4   --   2.2   --    --    PHOSPHORUS  3.8   --   2.6   --    --    CALCIUM  8.8   < >  8.3*  7.7*  8.2*    < > = values in this interval not displayed.       GI/Nutrition:  Exam: abdomen is soft and non-tender  Imaging: None - Reviewed  Patient taking by mouth.  Liver Function  Recent Labs      01/21/18   2254   01/22/18   0839  01/22/18   1308  01/23/18   0448   ALTSGPT  14   --    --    --   10   ASTSGOT  10*   --    --    --   22   ALKPHOSPHAT  146*   --     --    --   101*   TBILIRUBIN  1.5   --    --    --   1.1   LIPASE  205*   --    --    --   407*   GLUCOSE  1500*   < >  572*  528*  179*    < > = values in this interval not displayed.       Heme:  Recent Labs      18   1308   RBC  6.30*   --    HEMOGLOBIN  16.0   --    HEMATOCRIT  54.9*   --    PLATELETCT  274   --    PROTHROMBTM   --   14.9*   INR   --   1.20*       Infectious Disease:  Temp  Av.9 °C (98.4 °F)  Min: 36.3 °C (97.4 °F)  Max: 37.4 °C (99.4 °F)  Micro: no cultures pending  Recent Labs      18   0448   WBC  13.2*   --    NEUTSPOLYS  82.70*   --    LYMPHOCYTES  6.70*   --    MONOCYTES  9.90   --    EOSINOPHILS  0.00   --    BASOPHILS  0.20   --    ASTSGOT  10*  22   ALTSGPT  14  10   ALKPHOSPHAT  146*  101*   TBILIRUBIN  1.5  1.1     Current Facility-Administered Medications   Medication Dose Frequency Provider Last Rate Last Dose   • spironolactone (ALDACTONE) tablet 100 mg  100 mg DAILY Ashlyn Agarwal M.D.   Stopped at 18   • simvastatin (ZOCOR) tablet 20 mg  20 mg Q EVENING Ashlyn Agarwal M.D.   Stopped at 18   • omeprazole (PRILOSEC) capsule 20 mg  20 mg DAILY Ashlyn Agarwal M.D.   20 mg at 18 08   • metoprolol (LOPRESSOR) tablet 50 mg  50 mg BID Ashlyn Agarwal M.D.   Stopped at 18   • lisinopril (PRINIVIL) 10 MG tablet 10 mg  10 mg DAILY Ashlyn Agarwal M.D.   10 mg at 18 0824   • senna-docusate (PERICOLACE or SENOKOT S) 8.6-50 MG per tablet 2 Tab  2 Tab BID Ashlyn Agarwal M.D.   Stopped at 18 2017    And   • polyethylene glycol/lytes (MIRALAX) PACKET 1 Packet  1 Packet QDAY PRN Ashlyn Agarwal M.D.        And   • magnesium hydroxide (MILK OF MAGNESIA) suspension 30 mL  30 mL QDAY PRN Ashlyn Agarwal M.D.        And   • bisacodyl (DULCOLAX) suppository 10 mg  10 mg QDAY PRN Ashlyn Agarwal M.D.       • 1/2 NS infusion   Continuous Guerrero Hearn M.D. 100 mL/hr at 18 0124     •  ondansetron (ZOFRAN) syringe/vial injection 4 mg  4 mg Q4HRS PRN Ashlyn Agarwal M.D.       • ondansetron (ZOFRAN ODT) dispertab 4 mg  4 mg Q4HRS PRN Ashlyn Agarwal M.D.   Stopped at 01/22/18 1128   • promethazine (PHENERGAN) tablet 12.5-25 mg  12.5-25 mg Q4HRS PRN Ashlyn Agarwal M.D.       • promethazine (PHENERGAN) suppository 12.5-25 mg  12.5-25 mg Q4HRS PRN Ashlyn Agarwal M.D.       • prochlorperazine (COMPAZINE) injection 5-10 mg  5-10 mg Q4HRS PRN Ashlyn Agarwal M.D.   10 mg at 01/22/18 1130   • ziprasidone (GEODON) injection 20 mg  20 mg Q4HRS PRN Eric Dunne, D.OStevie       • insulin NPH (HUMULIN,NOVOLIN) injection 10 Units  10 Units BID INSULIN Guerrero Hearn M.D.   10 Units at 01/23/18 0616   • insulin regular (HUMULIN R) injection 3-14 Units  3-14 Units 4X/DAY ACHS Guerrero Hearn M.D.   4 Units at 01/23/18 0617    And   • glucose 4 g chewable tablet 16 g  16 g Q15 MIN PRN Guerrero Hearn M.D.        And   • dextrose 50% (D50W) injection 25 mL  25 mL Q15 MIN PRN Guerrero Hearn M.D.       • HYDROmorphone (DILAUDID) injection 0.5-1 mg  0.5-1 mg Q2HRS PRN Guerrero Hearn M.D.   1 mg at 01/22/18 1901   • lorazepam (ATIVAN) injection 0.5 mg  0.5 mg Once Eric Dunne, D.OStevie   Stopped at 01/22/18 2145     Last reviewed on 1/22/2018  2:16 PM by Kavon Chandra    Quality  Measures:   Labs reviewed and Medications reviewed   Walls catheter: No Walls and Critically Ill - Requiring Accurate Measurement of Urinary Output   Central line in place: Sepsis       DVT prophylaxis - mechanical: SCDs   Ulcer prophylaxis: Yes      Assessment and plan:    1. Nonketotic hyperosmolar syndrome.    -Patient off protocol at this time.  - Suggest increase of NPH and sliding scale and set of long-acting Lantus. Patient was on 80 units of Lantus at night.   -We'll check lipase, triglyceride level as well.    2.  Agitation.    -Suspect this is related to her hyperosmolar syndrome.  -Patient has been inappropriate and pulled out  catheter today as well as refused medications.  -Her ability to make adequate decisions for herself is poor.   -Therefore, psychiatry has been notified. I spoke with the exchange in were awaiting their decision.    3. History of DVT.     -Patient has not been compliant with these are also therapy. There was remote DVT history and therefore we have agreed that patient would unlikely require anticoagulation at this time.    4. Pseudohyponatremia related to severe hyperglycemia.     -Please see #1 above.    Discussed patient condition and risk of morbidity and/or mortality with Charge nurse / hot rounds, and multidisciplinary Rounds  The patient remains critically ill.  Critical care time = 35 minutes in directly providing and coordinating critical care and extensive data review.  No time overlap and excludes procedures.

## 2018-01-23 NOTE — PROGRESS NOTES
Patient moaning in pain. Points to her RLQ. Dr. Hearn assessed her. Stomach is tender all over. Orders received for labs in the am and PRN pain medication. Will continue to monitor.

## 2018-01-23 NOTE — DISCHARGE PLANNING
Received return tc from [facility] [place] stating that the pt's roommate was in the home and there was no one there that could assist the pt. [facility] [place] states she would call [facility].

## 2018-01-23 NOTE — PROGRESS NOTES
Dr Felicitas Villarreal by to see pt updates given. Pt remains confused but calmer at this time but still refusing medications and IV placement.

## 2018-01-23 NOTE — CARE PLAN
Problem: Safety  Goal: Will remain free from injury  Patient confused and only oriented to self, attempts to pull childs and leads off. Frequent reorientation given, restraints in place and applied appropriately. Patient educated about use of the call light and bed alarm is set and bed is in the lowest, locked position.     Problem: Knowledge Deficit  Goal: Knowledge of disease process/condition, treatment plan, diagnostic tests, and medications will improve    Intervention: Assess knowledge level of disease process/condition, treatment plan, diagnostic tests, and medications  Patient confused and only oriented to self, frequent reorientation to situation and surroundings given. Questions answered regarding patient condition and treatment plan.

## 2018-01-23 NOTE — PROGRESS NOTES
Dr Dunne by to see pt updated given. Updated on Pt having Sister that lives in [facility]. Dr [facility] to hear back from [facility].

## 2018-01-23 NOTE — PROGRESS NOTES
Patient confused at this time and her speech is incomprehensible. Per Dr. Dunne, hold PO medications at this time.
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
## 2018-01-23 NOTE — PROGRESS NOTES
Received bedside report from Salvador Rn. Plan of care discussed. Lines labs meds and orders reviewed.

## 2018-01-23 NOTE — DIETARY
Nutrition Services: Wound follow up.  Pt admitted with nonketotic hyperosmolar syndrome currently with agitation and pseudohyponatremia related to severe hyperglycemia; pt with hx of DM. Per wound care on 1/22: pt has a POA almost resolved St 3-4 pressure injury r/t scar tissue and skin around this is DTI. Per pictures, wound appears to be small and healing well (confirmed with RN). No need for vitamin therapy at this time. Diet advanced to Diabetic. RN reports pt with poor intake but expects this to improve as mentation improves. Labs/Meds reviewed.   Wt is 73.6 kg/ 162 lbs and BMI of 24.88.     Plan/Rec:  Nutrition Representative to see pt daily for snacks/meal preferences as appropriate with diet order. RD to monitor for adequate PO intake.

## 2018-01-23 NOTE — DISCHARGE PLANNING
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 TC to [facility]'s office. Requested that they send deputy out to check on the welfare and determine if Ki is able to take care of his needs while the pt remains hospitalized.

## 2018-01-23 NOTE — PROGRESS NOTES
Pt continuing to refuse care, yell out at nurses, screaming, attempting to hit at Rn. Pt refusing Iv's and medications. Dr Dunne at bedside to assess pt. Order received to give pt Geodon dose at this time.

## 2018-01-24 LAB
GLUCOSE BLD-MCNC: 136 MG/DL (ref 65–99)
GLUCOSE BLD-MCNC: 170 MG/DL (ref 65–99)
GLUCOSE BLD-MCNC: 176 MG/DL (ref 65–99)
GLUCOSE BLD-MCNC: 204 MG/DL (ref 65–99)

## 2018-01-24 PROCEDURE — 700105 HCHG RX REV CODE 258: Performed by: HOSPITALIST

## 2018-01-24 PROCEDURE — 82962 GLUCOSE BLOOD TEST: CPT | Mod: 91

## 2018-01-24 PROCEDURE — 700111 HCHG RX REV CODE 636 W/ 250 OVERRIDE (IP): Performed by: HOSPITALIST

## 2018-01-24 PROCEDURE — A9270 NON-COVERED ITEM OR SERVICE: HCPCS | Performed by: HOSPITALIST

## 2018-01-24 PROCEDURE — 700102 HCHG RX REV CODE 250 W/ 637 OVERRIDE(OP): Performed by: HOSPITALIST

## 2018-01-24 PROCEDURE — 99232 SBSQ HOSP IP/OBS MODERATE 35: CPT | Performed by: HOSPITALIST

## 2018-01-24 PROCEDURE — 770001 HCHG ROOM/CARE - MED/SURG/GYN PRIV*

## 2018-01-24 RX ADMIN — SPIRONOLACTONE 100 MG: 100 TABLET, FILM COATED ORAL at 09:53

## 2018-01-24 RX ADMIN — CLONAZEPAM 1 MG: 1 TABLET ORAL at 15:11

## 2018-01-24 RX ADMIN — SODIUM CHLORIDE: 4.5 INJECTION, SOLUTION INTRAVENOUS at 20:40

## 2018-01-24 RX ADMIN — OMEPRAZOLE 20 MG: 20 CAPSULE, DELAYED RELEASE ORAL at 09:54

## 2018-01-24 RX ADMIN — INSULIN HUMAN 4 UNITS: 100 INJECTION, SOLUTION PARENTERAL at 15:22

## 2018-01-24 RX ADMIN — SODIUM CHLORIDE: 4.5 INJECTION, SOLUTION INTRAVENOUS at 05:49

## 2018-01-24 RX ADMIN — LISINOPRIL 10 MG: 20 TABLET ORAL at 09:52

## 2018-01-24 RX ADMIN — DULOXETINE HYDROCHLORIDE 30 MG: 30 CAPSULE, DELAYED RELEASE ORAL at 11:02

## 2018-01-24 RX ADMIN — INSULIN HUMAN 3 UNITS: 100 INJECTION, SOLUTION PARENTERAL at 10:56

## 2018-01-24 RX ADMIN — ENOXAPARIN SODIUM 40 MG: 100 INJECTION SUBCUTANEOUS at 09:52

## 2018-01-24 RX ADMIN — INSULIN HUMAN 20 UNITS: 100 INJECTION, SUSPENSION SUBCUTANEOUS at 18:09

## 2018-01-24 RX ADMIN — METOPROLOL TARTRATE 50 MG: 50 TABLET, FILM COATED ORAL at 20:21

## 2018-01-24 RX ADMIN — STANDARDIZED SENNA CONCENTRATE AND DOCUSATE SODIUM 2 TABLET: 8.6; 5 TABLET, FILM COATED ORAL at 09:55

## 2018-01-24 RX ADMIN — HYDROMORPHONE HYDROCHLORIDE 1 MG: 2 INJECTION INTRAMUSCULAR; INTRAVENOUS; SUBCUTANEOUS at 18:20

## 2018-01-24 RX ADMIN — CLONAZEPAM 1 MG: 1 TABLET ORAL at 20:20

## 2018-01-24 RX ADMIN — SIMVASTATIN 20 MG: 20 TABLET, FILM COATED ORAL at 20:21

## 2018-01-24 RX ADMIN — METOPROLOL TARTRATE 50 MG: 50 TABLET, FILM COATED ORAL at 09:55

## 2018-01-24 RX ADMIN — CLONAZEPAM 1 MG: 1 TABLET ORAL at 09:52

## 2018-01-24 RX ADMIN — INSULIN HUMAN 20 UNITS: 100 INJECTION, SUSPENSION SUBCUTANEOUS at 10:55

## 2018-01-24 RX ADMIN — ONDANSETRON 4 MG: 2 INJECTION INTRAMUSCULAR; INTRAVENOUS at 19:25

## 2018-01-24 ASSESSMENT — PAIN SCALES - GENERAL
PAINLEVEL_OUTOF10: 0
PAINLEVEL_OUTOF10: 8
PAINLEVEL_OUTOF10: 0
PAINLEVEL_OUTOF10: 3
PAINLEVEL_OUTOF10: 3
PAINLEVEL_OUTOF10: 0

## 2018-01-24 ASSESSMENT — LIFESTYLE VARIABLES: DO YOU DRINK ALCOHOL: NO

## 2018-01-24 NOTE — CARE PLAN
Problem: Safety  Goal: Will remain free from injury  Outcome: PROGRESSING AS EXPECTED  Pt educated on calling for all needs, not getting out of bed. Pt bed locked and in lowest position. Call light in place. Restraints in place. Pt reoriented with all interactions.

## 2018-01-24 NOTE — PROGRESS NOTES
Patient agitated and verbally aggressive, insisting the RN leave the room. Patient is refusing vital signs, FSBG, lab draw and insulin administration.

## 2018-01-24 NOTE — CARE PLAN
Problem: Knowledge Deficit  Goal: Knowledge of disease process/condition, treatment plan, diagnostic tests, and medications will improve    Intervention: Assess knowledge level of disease process/condition, treatment plan, diagnostic tests, and medications  Discussed plan of care with patient, patient is confused and agitated at this time. Answered questions regarding treatment plan, frequent reorientation of situation and surroundings given.      Problem: Mobility  Goal: Risk for activity intolerance will decrease    Intervention: Assess and monitor signs of activity intolerance  Patient up to cardiac chair for approximately 15 minutes and tolerated well, besides some general agitation.
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
## 2018-01-24 NOTE — PROGRESS NOTES
"Pulmonary Critical Care Progress Note        Chief Complaint: Elevated blood sugar, altered mental status.    History of Present Illness: 57 y.o. female admitted for with past   medical history significant for poorly controlled diabetes and hypertension,   who was just here in December for DKA, who left against medical advice on   December 4th for unclear reasons.  She was brought in this evening by EMS   after her father called and found her to be confused.  Apparently, she has   been altered for approximately 1 week now with elevated blood sugars.  EMS   checked her sugars and found the machine to read \"high.\"  In the emergency   department, patient was found to be tachycardic, but normotensive, dehydrated   and with a glucose of 1500 with elevated anion gap and low bicarbonate level.    She was given IV fluids and started on insulin drip and is being admitted to   the intensive care unit for critical care management.    Please note that all above history obtained by my partner Dr. Gonda.    ROS:  Respiratory: unable to perform due to the patient's inability to effectively communicate and positive shortness of breath, Cardiac: unable to perform due to the patient's inability to effectively communicate and negative, GI: positive nausea.  All other systems negative.    Interval Events:  24 hour interval history reviewed    -Improved  -Less paranoid  -Acidosis improved  -Psychiatry eval today done  -Took oral meds today  -No labs per patient request  -Diabetic diet  -D/c childs  -Hemoglobin A1c 15    PFSH:  No change.    Respiratory:     Pulse Oximetry: 94 %  Chest Tube Drains:          Exam: unlabored respirations, no intercostal retractions or accessory muscle use  ImagingAvailable data reviewed no new chest x-ray today.        Invalid input(s): [id number]    HemoDynamics:  Pulse: 90, Heart Rate (Monitored): 90  NIBP: 137/67       Exam: regular rate and rhythm  Imaging: None - Reviewed        Neuro:  GCS Total " Raghavendra Coma Score: 14       Exam: oriented for age x3. Patient has been agitated during the day today. Patient has received Geodon and Ativan. She has no obvious focal deficits on exam. Psychiatric consultation has been ordered. Risperdal recommended, however multiple other medications will need to be adjusted.   Imaging: None - Reviewed    Fluids:  Intake/Output       01/22/18 0700 - 01/23/18 0659 01/23/18 0700 - 01/24/18 0659 01/24/18 0700 - 01/25/18 0659      7676-0304 1639-4171 Total 3870-4320 0029-3147 Total 4299-8653 9551-8163 Total       Intake    P.O.  240  -- 240  500  220 720  --  -- --    P.O. 240 -- 240 500 220 720 -- -- --    I.V.  1854.4  1200 3054.4  550  1200 1750  --  -- --    Insulin Volume 54.4 -- 54.4 -- -- -- -- -- --    IV Volume (NS) 1800 1200 3000 550 1200 1750 -- -- --    Total Intake 2094.4 1200 3294.4 1050 1420 2470 -- -- --       Output    Urine  960  700 1660  870  600 1470  --  -- --    Indwelling Cathether   -- -- --    Stool  --  -- --  --  -- --  --  -- --    Number of Times Stooled 0 x 0 x 0 x -- 0 x 0 x -- -- --    Total Output   -- -- --       Net I/O     1134.4 500 1634.4  -- -- --           Recent Labs      01/22/18   0108   01/22/18   0839  01/22/18   1308  01/23/18   0448   SODIUM  140   < >  147*  147*  151*   POTASSIUM  4.9   < >  4.1  4.0  3.8   CHLORIDE  103   < >  114*  114*  117*   CO2  17*   < >  27  22  25   BUN  47*   < >  35*  35*  30*   CREATININE  2.03*   < >  1.33  1.31  1.05   MAGNESIUM  2.4   --   2.2   --    --    PHOSPHORUS  3.8   --   2.6   --    --    CALCIUM  8.8   < >  8.3*  7.7*  8.2*    < > = values in this interval not displayed.       GI/Nutrition:  Exam: abdomen is soft and non-tender  Imaging: None - Reviewed  Patient taking by mouth.  Liver Function  Recent Labs      01/21/18   2254   01/22/18   0839  01/22/18   1308  01/23/18   0448   ALTSGPT  14   --    --    --   10   ASTSGOT  10*   --     --    --   22   ALKPHOSPHAT  146*   --    --    --   101*   TBILIRUBIN  1.5   --    --    --   1.1   LIPASE  205*   --    --    --   407*   GLUCOSE  1500*   < >  572*  528*  179*    < > = values in this interval not displayed.       Heme:  Recent Labs      184  18   1308  18   0448   RBC  6.30*   --   5.12   HEMOGLOBIN  16.0   --   12.9   HEMATOCRIT  54.9*   --   40.7   PLATELETCT  274   --   139*   PROTHROMBTM   --   14.9*   --    INR   --   1.20*   --        Infectious Disease:  Temp  Av.5 °C (97.7 °F)  Min: 36.3 °C (97.3 °F)  Max: 37.2 °C (99 °F)  Micro: no cultures pending  Recent Labs      18   0448   WBC  13.2*  11.3*   NEUTSPOLYS  82.70*   --    LYMPHOCYTES  6.70*   --    MONOCYTES  9.90   --    EOSINOPHILS  0.00   --    BASOPHILS  0.20   --    ASTSGOT  10*  22   ALTSGPT  14  10   ALKPHOSPHAT  146*  101*   TBILIRUBIN  1.5  1.1     Current Facility-Administered Medications   Medication Dose Frequency Provider Last Rate Last Dose   • Influenza Vaccine Quad pf injection 0.5 mL  0.5 mL Once PRN Eric Dunne, D.O.       • clonazepam (KLONOPIN) tablet 1 mg  1 mg TID Barry Villarreal M.D.   1 mg at 18   • duloxetine (CYMBALTA) capsule 30 mg  30 mg BID Barry Villarreal M.D.       • insulin NPH (HUMULIN,NOVOLIN) injection 20 Units  20 Units BID INSULIN Barry Villarreal M.D.   20 Units at 18   • enoxaparin (LOVENOX) inj 40 mg  40 mg DAILY Barry Villarreal M.D.       • ziprasidone (GEODON) injection 20 mg  20 mg Q4HRS PRN Eric Dunne D.O.       • spironolactone (ALDACTONE) tablet 100 mg  100 mg DAILY Ashlyn Agarwal M.D.   100 mg at 18 0910   • simvastatin (ZOCOR) tablet 20 mg  20 mg Q EVENING Ashlyn Agarwal M.D.   20 mg at 18   • omeprazole (PRILOSEC) capsule 20 mg  20 mg DAILY Ashlyn Agarwal M.D.   20 mg at 1811   • metoprolol (LOPRESSOR) tablet 50 mg  50 mg BID Ashlyn Agarwal M.D.   50 mg  at 01/23/18 2035   • lisinopril (PRINIVIL) 10 MG tablet 10 mg  10 mg DAILY Ashlyn Agarwal M.D.   10 mg at 01/23/18 0910   • senna-docusate (PERICOLACE or SENOKOT S) 8.6-50 MG per tablet 2 Tab  2 Tab BID Ashlyn Agarwal M.D.   Stopped at 01/22/18 2017    And   • polyethylene glycol/lytes (MIRALAX) PACKET 1 Packet  1 Packet QDAY PRN Ashlyn Agarwal M.D.        And   • magnesium hydroxide (MILK OF MAGNESIA) suspension 30 mL  30 mL QDAY PRN Ashlyn Agarwal M.D.        And   • bisacodyl (DULCOLAX) suppository 10 mg  10 mg QDAY PRN Ashlyn Agarwal M.D.       • 1/2 NS infusion   Continuous Guerrero Hearn M.D. 100 mL/hr at 01/24/18 0549     • ondansetron (ZOFRAN) syringe/vial injection 4 mg  4 mg Q4HRS PRN Aslhyn Agarwal M.D.       • ondansetron (ZOFRAN ODT) dispertab 4 mg  4 mg Q4HRS PRN Ashlyn Agarwal M.D.   Stopped at 01/22/18 1128   • promethazine (PHENERGAN) tablet 12.5-25 mg  12.5-25 mg Q4HRS PRN Ashlyn Agarwal M.D.       • promethazine (PHENERGAN) suppository 12.5-25 mg  12.5-25 mg Q4HRS PRN Ashlyn Agarwal M.D.       • prochlorperazine (COMPAZINE) injection 5-10 mg  5-10 mg Q4HRS PRN Ashlyn Agarwal M.D.   10 mg at 01/22/18 1130   • insulin regular (HUMULIN R) injection 3-14 Units  3-14 Units 4X/DAY ACHS Guerrero Hearn M.D.   3 Units at 01/23/18 2031    And   • glucose 4 g chewable tablet 16 g  16 g Q15 MIN PRN Guerrero Hearn M.D.        And   • dextrose 50% (D50W) injection 25 mL  25 mL Q15 MIN PRN Guerrero Hearn M.D.       • HYDROmorphone (DILAUDID) injection 0.5-1 mg  0.5-1 mg Q2HRS PRN Guerrero Hearn M.D.   1 mg at 01/22/18 1901     Last reviewed on 1/22/2018  2:16 PM by Kavon Chandra    Quality  Measures:  Labs reviewed and Medications reviewed  Walls catheter: No Walls and Critically Ill - Requiring Accurate Measurement of Urinary Output  Central line in place: Sepsis      DVT prophylaxis - mechanical: SCDs  Ulcer prophylaxis: Yes      Assessment and plan:    1. Nonketotic hyperosmolar  syndrome.    -Patient off protocol at this time.  -Because of the timing of the NPH will continue similar dosing schedule NPH and sliding scale and set of long-acting Lantus. Patient was on 80 units of Lantus at night, in the past    2.  Agitation.    -Suspect this is related to her hyperosmolar syndrome, initially, however patient has underlying psychiatric disorder. Appreciate psychiatry evaluation today.  -Patient has been inappropriate and pulled out catheter today as well as refused medications.  -Patient is not competent and would require certification hold, and unable to go against medical advice.  -Geodon has been given for agitation intermittently.    3. History of DVT.     -Patient has not been compliant with these are also therapy. There was remote DVT history and therefore we have agreed that patient would unlikely require anticoagulation at this time.    4. Pseudohyponatremia related to severe hyperglycemia.     -Please see #1 above.    5.  Hypernatremia.    -IV fluids have been adjusted. We'll continue to follow and adjust IV fluids as necessary.    Discussed patient condition and risk of morbidity and/or mortality with Charge nurse / hot rounds, and multidisciplinary Rounds    Eric Dunne D.O.

## 2018-01-24 NOTE — DISCHARGE PLANNING
TC from pt's friend and roommate, Ki Couch. Ki informs this SW that he is the  of the pt. Ki later stated that he is just friends with the pt and not . Ki informs this SW that he relies on the pt for day to day assistance. Ki confirmed that the 's Dept came by the home yesterday for a welfare check. Ki stated that he was going to have a SW come visit him either today or tomorrow. Ki also stated that he had a court scheduled for this Friday in [place].    TC to [facility]. Informed that they did not have Ki listed as someone that is to be seen by an SW. TC to the Division of Aging with the [facility]. They stated they could not confirm or deny that Ki was on their caseload. Updated the  regarding Gerson's needs and concerns.

## 2018-01-24 NOTE — CARE PLAN
Problem: Skin Integrity  Goal: Risk for impaired skin integrity will decrease  Mepilex inplace and wound care following pt. Pt turned q 2 hrs and skin kept clean and dry.   Intervention: Assess risk factors for impaired skin integrity and/or pressure ulcers  assessed  Intervention: Assess and monitor skin integrity, appearance and/or temperature  assessed

## 2018-01-24 NOTE — PROGRESS NOTES
[facility] Hospitalist Progress Note    Date of Service: 2018    Chief Complaint  57 y.o. female admitted 2018 with DKA    Interval Problem Update  Confused and agitated and pulled out IVs    Consultants/Specialty  Critical care  Psychiatry    Disposition  To be determined        Review of Systems   Unable to perform ROS: Mental status change      Physical Exam  Laboratory/Imaging   Hemodynamics  Temp (24hrs), Av.9 °C (98.4 °F), Min:36.3 °C (97.3 °F), Max:37.4 °C (99.4 °F)   Temperature: 36.3 °C (97.3 °F)  Pulse  Av.6  Min: 75  Max: 150 Heart Rate (Monitored): 93  NIBP: 141/65      Respiratory      Respiration: 20, Pulse Oximetry: 95 %        RUL Breath Sounds: Clear, RML Breath Sounds: Clear, RLL Breath Sounds: Clear, EDWIN Breath Sounds: Clear, LLL Breath Sounds: Clear    Fluids    Intake/Output Summary (Last 24 hours) at 18 1929  Last data filed at 18 1200   Gross per 24 hour   Intake             2100 ml   Output             1250 ml   Net              850 ml       Nutrition  Orders Placed This Encounter   Procedures   • DIET ORDER     Standing Status:   Standing     Number of Occurrences:   1     Order Specific Question:   Diet:     Answer:   Diabetic [3]     Physical Exam   Constitutional: She is oriented to person, place, and time. She appears well-developed and well-nourished.   HENT:   Head: Normocephalic and atraumatic.   Right Ear: External ear normal.   Left Ear: External ear normal.   Mouth/Throat: No oropharyngeal exudate.   Eyes: Conjunctivae are normal. Right eye exhibits no discharge. Left eye exhibits no discharge. No scleral icterus.   Neck: Neck supple. No JVD present. No tracheal deviation present.   Cardiovascular: Normal rate and regular rhythm.  Exam reveals no gallop and no friction rub.    No murmur heard.  Pulmonary/Chest: Effort normal and breath sounds normal. No stridor. No respiratory distress. She has no wheezes. She has no rales. She exhibits no tenderness.    Abdominal: Soft. Bowel sounds are normal. She exhibits no distension and no mass. There is no tenderness. There is no rebound and no guarding.   Musculoskeletal: She exhibits no edema or tenderness.   Neurological: She is alert and oriented to person, place, and time. No cranial nerve deficit. She exhibits normal muscle tone.   Skin: Skin is warm and dry. She is not diaphoretic. No cyanosis. Nails show no clubbing.   Psychiatric: She is agitated and actively hallucinating. Cognition and memory are impaired. She expresses impulsivity.   Nursing note and vitals reviewed.      Recent Labs      01/21/18   2254  01/23/18   0448   WBC  13.2*  11.3*   RBC  6.30*  5.12   HEMOGLOBIN  16.0  12.9   HEMATOCRIT  54.9*  40.7   MCV  87.1  79.5*   MCH  25.4*  25.2*   MCHC  29.1*  31.7*   RDW  46.0  41.1   PLATELETCT  274  139*   MPV  11.6  11.8     Recent Labs      01/22/18   0839  01/22/18   1308  01/23/18   0448   SODIUM  147*  147*  151*   POTASSIUM  4.1  4.0  3.8   CHLORIDE  114*  114*  117*   CO2  27  22  25   GLUCOSE  572*  528*  179*   BUN  35*  35*  30*   CREATININE  1.33  1.31  1.05   CALCIUM  8.3*  7.7*  8.2*     Recent Labs      01/22/18   1308   INR  1.20*         Recent Labs      01/23/18   0448   TRIGLYCERIDE  220*          Assessment/Plan     * DKA (diabetic ketoacidoses) (CMS-[facility])   Assessment & Plan    Resolved increase NPH 20 units twice a day continue sliding scale insulin and monitor blood sugars  Continue IV fluids          Metabolic encephalopathy   Assessment & Plan    CT head neg  No focal deficits on exam  We'll try to obtain more information about her baseline from family  We will resume her home psychiatric medications if not improved consider psych eval        Hyperlipidemia- (present on admission)   Assessment & Plan    Continue Zocor.        Hypertension- (present on admission)   Assessment & Plan    Stable on metoprolol and Prinivil.        History of DVT (deep vein thrombosis)- (present on  admission)   Assessment & Plan    Lower extremity duplex was negative      The patient has been noncompliant with her Xarelto  Will DC anticoagulation at this time and try to obtain records from [facility] where she was supposedly diagnosed with DVT              Reviewed items::  Labs reviewed, Medications reviewed and Radiology images reviewed  DVT prophylaxis pharmacological::  Enoxaparin (Lovenox)

## 2018-01-25 LAB
ALBUMIN SERPL BCP-MCNC: 2.4 G/DL (ref 3.2–4.9)
ALBUMIN/GLOB SERPL: 1 G/DL
ALP SERPL-CCNC: 115 U/L (ref 30–99)
ALT SERPL-CCNC: 18 U/L (ref 2–50)
ANION GAP SERPL CALC-SCNC: 6 MMOL/L (ref 0–11.9)
AST SERPL-CCNC: 33 U/L (ref 12–45)
BILIRUB SERPL-MCNC: 1.3 MG/DL (ref 0.1–1.5)
BUN SERPL-MCNC: 20 MG/DL (ref 8–22)
CALCIUM SERPL-MCNC: 8 MG/DL (ref 8.4–10.2)
CHLORIDE SERPL-SCNC: 109 MMOL/L (ref 96–112)
CO2 SERPL-SCNC: 24 MMOL/L (ref 20–33)
CREAT SERPL-MCNC: 0.87 MG/DL (ref 0.5–1.4)
ERYTHROCYTE [DISTWIDTH] IN BLOOD BY AUTOMATED COUNT: 38.3 FL (ref 35.9–50)
GLOBULIN SER CALC-MCNC: 2.5 G/DL (ref 1.9–3.5)
GLUCOSE BLD-MCNC: 131 MG/DL (ref 65–99)
GLUCOSE BLD-MCNC: 160 MG/DL (ref 65–99)
GLUCOSE BLD-MCNC: 181 MG/DL (ref 65–99)
GLUCOSE BLD-MCNC: 99 MG/DL (ref 65–99)
GLUCOSE SERPL-MCNC: 78 MG/DL (ref 65–99)
HCT VFR BLD AUTO: 38.9 % (ref 37–47)
HGB BLD-MCNC: 12 G/DL (ref 12–16)
MCH RBC QN AUTO: 24 PG (ref 27–33)
MCHC RBC AUTO-ENTMCNC: 30.8 G/DL (ref 33.6–35)
MCV RBC AUTO: 78 FL (ref 81.4–97.8)
PLATELET # BLD AUTO: 132 K/UL (ref 164–446)
PMV BLD AUTO: 10.7 FL (ref 9–12.9)
POTASSIUM SERPL-SCNC: 3.5 MMOL/L (ref 3.6–5.5)
PROT SERPL-MCNC: 4.9 G/DL (ref 6–8.2)
RBC # BLD AUTO: 4.99 M/UL (ref 4.2–5.4)
SODIUM SERPL-SCNC: 139 MMOL/L (ref 135–145)
WBC # BLD AUTO: 10.1 K/UL (ref 4.8–10.8)

## 2018-01-25 PROCEDURE — A9270 NON-COVERED ITEM OR SERVICE: HCPCS | Performed by: HOSPITALIST

## 2018-01-25 PROCEDURE — 82962 GLUCOSE BLOOD TEST: CPT | Mod: 91

## 2018-01-25 PROCEDURE — 700102 HCHG RX REV CODE 250 W/ 637 OVERRIDE(OP): Performed by: HOSPITALIST

## 2018-01-25 PROCEDURE — 85027 COMPLETE CBC AUTOMATED: CPT

## 2018-01-25 PROCEDURE — 80053 COMPREHEN METABOLIC PANEL: CPT

## 2018-01-25 PROCEDURE — 99232 SBSQ HOSP IP/OBS MODERATE 35: CPT | Performed by: HOSPITALIST

## 2018-01-25 PROCEDURE — 770006 HCHG ROOM/CARE - MED/SURG/GYN SEMI*

## 2018-01-25 PROCEDURE — 700111 HCHG RX REV CODE 636 W/ 250 OVERRIDE (IP): Performed by: HOSPITALIST

## 2018-01-25 RX ORDER — CLONAZEPAM 1 MG/1
1 TABLET ORAL 3 TIMES DAILY PRN
Status: DISCONTINUED | OUTPATIENT
Start: 2018-01-25 | End: 2018-01-26 | Stop reason: HOSPADM

## 2018-01-25 RX ADMIN — INSULIN HUMAN 3 UNITS: 100 INJECTION, SOLUTION PARENTERAL at 16:55

## 2018-01-25 RX ADMIN — SPIRONOLACTONE 100 MG: 100 TABLET, FILM COATED ORAL at 08:17

## 2018-01-25 RX ADMIN — CLONAZEPAM 1 MG: 1 TABLET ORAL at 16:51

## 2018-01-25 RX ADMIN — SIMVASTATIN 20 MG: 20 TABLET, FILM COATED ORAL at 21:30

## 2018-01-25 RX ADMIN — DULOXETINE HYDROCHLORIDE 30 MG: 30 CAPSULE, DELAYED RELEASE ORAL at 12:34

## 2018-01-25 RX ADMIN — INSULIN HUMAN 3 UNITS: 100 INJECTION, SOLUTION PARENTERAL at 21:35

## 2018-01-25 RX ADMIN — INSULIN HUMAN 20 UNITS: 100 INJECTION, SUSPENSION SUBCUTANEOUS at 16:55

## 2018-01-25 RX ADMIN — OMEPRAZOLE 20 MG: 20 CAPSULE, DELAYED RELEASE ORAL at 08:05

## 2018-01-25 RX ADMIN — INSULIN HUMAN 20 UNITS: 100 INJECTION, SUSPENSION SUBCUTANEOUS at 08:06

## 2018-01-25 RX ADMIN — DULOXETINE HYDROCHLORIDE 30 MG: 30 CAPSULE, DELAYED RELEASE ORAL at 23:37

## 2018-01-25 RX ADMIN — ENOXAPARIN SODIUM 40 MG: 100 INJECTION SUBCUTANEOUS at 08:05

## 2018-01-25 RX ADMIN — METOPROLOL TARTRATE 50 MG: 50 TABLET, FILM COATED ORAL at 21:30

## 2018-01-25 RX ADMIN — CLONAZEPAM 1 MG: 1 TABLET ORAL at 08:05

## 2018-01-25 RX ADMIN — LISINOPRIL 10 MG: 20 TABLET ORAL at 08:05

## 2018-01-25 RX ADMIN — METOPROLOL TARTRATE 50 MG: 50 TABLET, FILM COATED ORAL at 08:05

## 2018-01-25 ASSESSMENT — PAIN SCALES - GENERAL
PAINLEVEL_OUTOF10: 0
PAINLEVEL_OUTOF10: 3
PAINLEVEL_OUTOF10: 0
PAINLEVEL_OUTOF10: 3
PAINLEVEL_OUTOF10: 3
PAINLEVEL_OUTOF10: 0
PAINLEVEL_OUTOF10: 3

## 2018-01-25 NOTE — CARE PLAN
Problem: Mobility  Goal: Risk for activity intolerance will decrease  Outcome: PROGRESSING AS EXPECTED  Up to BSC with 1-2 person assist, unsteady to BLE. Enc'd to call for help when needing to get up OOB. Frequent rest periods provided.

## 2018-01-25 NOTE — PROGRESS NOTES
[facility] Progress Note    Date of Service: 2018    Chief Complaint  57 y.o. female admitted 2018 with DKA    Interval Problem Update    Remains confused less agitated    Consultants/Specialty  Critical care  Psychiatry    Disposition  To be determined        Review of Systems   Unable to perform ROS: Mental status change      Physical Exam  Laboratory/Imaging   Hemodynamics  Temp (24hrs), Av.2 °C (98.9 °F), Min:36.9 °C (98.4 °F), Max:37.6 °C (99.6 °F)   Temperature: 37.2 °C (98.9 °F)  Pulse  Av.3  Min: 75  Max: 150 Heart Rate (Monitored): 95  NIBP: 142/65      Respiratory      Respiration: 15, Pulse Oximetry: 98 %        RUL Breath Sounds: Clear, RML Breath Sounds: Clear, RLL Breath Sounds: Clear, EDWIN Breath Sounds: Clear, LLL Breath Sounds: Clear    Fluids    Intake/Output Summary (Last 24 hours) at 18 1803  Last data filed at 18 1200   Gross per 24 hour   Intake             1920 ml   Output              915 ml   Net             1005 ml       Nutrition  Orders Placed This Encounter   Procedures   • DIET ORDER     Standing Status:   Standing     Number of Occurrences:   1     Order Specific Question:   Diet:     Answer:   Diabetic [3]     Physical Exam   Constitutional: She appears well-developed and well-nourished.   HENT:   Head: Normocephalic and atraumatic.   Mouth/Throat: Oropharynx is clear and moist.   Eyes: Pupils are equal, round, and reactive to light. Right eye exhibits no discharge. Left eye exhibits no discharge. No scleral icterus.   Neck: Neck supple. No JVD present. No tracheal deviation present.   Cardiovascular: Normal rate and regular rhythm.  Exam reveals no gallop and no friction rub.    No murmur heard.  Pulmonary/Chest: Effort normal and breath sounds normal. No respiratory distress. She has no wheezes. She has no rales. She exhibits no tenderness.   Abdominal: Soft. Bowel sounds are normal. She exhibits no distension. There is no tenderness. There is  no rebound.   Musculoskeletal: She exhibits no edema or tenderness.   Neurological: She is alert. No cranial nerve deficit. She exhibits normal muscle tone.   Oriented x2   Skin: Skin is warm and dry. She is not diaphoretic. No cyanosis. Nails show no clubbing.   Psychiatric: Cognition and memory are impaired. She expresses impulsivity. She exhibits abnormal recent memory.   Nursing note and vitals reviewed.      Recent Labs      01/21/18   2254  01/23/18   0448   WBC  13.2*  11.3*   RBC  6.30*  5.12   HEMOGLOBIN  16.0  12.9   HEMATOCRIT  54.9*  40.7   MCV  87.1  79.5*   MCH  25.4*  25.2*   MCHC  29.1*  31.7*   RDW  46.0  41.1   PLATELETCT  274  139*   MPV  11.6  11.8     Recent Labs      01/22/18   0839  01/22/18   1308  01/23/18   0448   SODIUM  147*  147*  151*   POTASSIUM  4.1  4.0  3.8   CHLORIDE  114*  114*  117*   CO2  27  22  25   GLUCOSE  572*  528*  179*   BUN  35*  35*  30*   CREATININE  1.33  1.31  1.05   CALCIUM  8.3*  7.7*  8.2*     Recent Labs      01/22/18   1308   INR  1.20*         Recent Labs      01/23/18   0448   TRIGLYCERIDE  220*          Assessment/Plan     * DKA (diabetic ketoacidoses) (CMS-[facility])   Assessment & Plan    Resolved continue  NPH 20 units twice a day   continue sliding scale insulin and monitor blood sugars  Continue IV fluids until oral intake is improved          Metabolic encephalopathy   Assessment & Plan    CT head neg  No focal deficits on exam  We'll try to obtain more information about her baseline from family  Discussed with psychiatry  Continue when necessary Geodon  Minimize sedating agents  Frequent orientation        Hyperlipidemia- (present on admission)   Assessment & Plan    Continue Zocor.        Hypertension- (present on admission)   Assessment & Plan    Stable on metoprolol and Prinivil.        History of DVT (deep vein thrombosis)- (present on admission)   Assessment & Plan    Lower extremity duplex was negative      The patient has been noncompliant with her  Jonna  Will DC anticoagulation at this time and try to obtain records from [facility] where she was supposedly diagnosed with DVT  Given her history of noncompliance the risks of anticoagulation outweigh potential benefits at this time              Reviewed items::  Labs reviewed, Medications reviewed and Radiology images reviewed  Walls catheter::  No Walls  DVT prophylaxis pharmacological::  Enoxaparin (Lovenox)

## 2018-01-25 NOTE — PROGRESS NOTES
Pt appropriate with restraints off while this RN in room several times over past 4 hours. Restraints d/kimmie with safety agreement with pt. DO Uzair notified.

## 2018-01-25 NOTE — PROGRESS NOTES
Assumed care. No complaints. Discussed poc, safety, call system, medications and unit routine. All questions/concerns addressed. All needs met. No distress. Pt confused, reoriented. Fall precautions in place.

## 2018-01-25 NOTE — PROGRESS NOTES
"Pulmonary Critical Care Progress Note        Chief Complaint: Elevated blood sugar, altered mental status.    History of Present Illness: 57 y.o. female admitted for with past   medical history significant for poorly controlled diabetes and hypertension,   who was just here in December for DKA, who left against medical advice on   December 4th for unclear reasons.  She was brought in this evening by EMS   after her father called and found her to be confused.  Apparently, she has   been altered for approximately 1 week now with elevated blood sugars.  EMS   checked her sugars and found the machine to read \"high.\"  In the emergency   department, patient was found to be tachycardic, but normotensive, dehydrated   and with a glucose of 1500 with elevated anion gap and low bicarbonate level.    She was given IV fluids and started on insulin drip and is being admitted to   the intensive care unit for critical care management.    Please note that all above history obtained by my partner Dr. Gonda.    ROS:  Respiratory: unable to perform due to the patient's inability to effectively communicate and positive shortness of breath, Cardiac: unable to perform due to the patient's inability to effectively communicate and negative, GI: positive nausea.  All other systems negative.    Interval Events:  24 hour interval history reviewed    Less paranoid  lovenox  nph bid. 10 units SS over 24 hours.   Supplements ordered today.    PFSH:  No change.    Respiratory:     Pulse Oximetry: 96 %  Chest Tube Drains:          Exam: unlabored respirations, no intercostal retractions or accessory muscle use  ImagingAvailable data reviewed no new chest x-ray today.        Invalid input(s): [id number]    HemoDynamics:  Pulse: 84, Heart Rate (Monitored): 81  Blood Pressure: 130/67, NIBP: 137/67       Exam: regular rate and rhythm  Imaging: None - Reviewed        Neuro:  GCS Total Raghavendra Coma Score: 14       Exam: oriented for age x3. Patient " has been agitated during the day today. Mood stabilized on her home therapy as well as Risperdal as needed recommended by psychiatry. She has no obvious focal deficits on exam. Patient has decreased sensation peripherally in her lower extremities in a stocking glove distribution. She has normal reflexes otherwise upper and lower extremity. Tangential speech is noted. Cerebellar reflexes appear normal.  Imaging: None - Reviewed    Fluids:  Intake/Output       01/23/18 0700 - 01/24/18 0659 01/24/18 0700 - 01/25/18 0659 01/25/18 0700 - 01/26/18 0659      2947-3873 8385-1518 Total 4281-2536 0784-3706 Total 3521-4947 9775-1781 Total       Intake    P.O.  500  220 720  260  60 320  --  -- --    P.O. 500 220 720 260 60 320 -- -- --    I.V.  550  1200 1750  700  600 1300  --  -- --    IV Volume (NS) 550 1200 1750  -- -- --    Total Intake 1050 1420 2470  -- -- --       Output    Urine  870  600 1470  315  100 415  --  -- --    Number of Times Incontinent of Urine -- -- -- 1 x 3 x 4 x -- -- --    Indwelling Cathether  315 -- 315 -- -- --    Void (ml) -- -- -- 0 100 100 -- -- --    Stool  --  -- --  --  -- --  --  -- --    Number of Times Stooled -- 0 x 0 x 2 x -- 2 x -- -- --    Total Output  315 100 415 -- -- --       Net I/O       -- -- --        Weight: 79.2 kg (174 lb 9.7 oz)  Recent Labs      01/22/18   0839  01/22/18   1308  01/23/18   0448   SODIUM  147*  147*  151*   POTASSIUM  4.1  4.0  3.8   CHLORIDE  114*  114*  117*   CO2  27  22  25   BUN  35*  35*  30*   CREATININE  1.33  1.31  1.05   MAGNESIUM  2.2   --    --    PHOSPHORUS  2.6   --    --    CALCIUM  8.3*  7.7*  8.2*       GI/Nutrition:  Exam: abdomen is soft and non-tender. Moderately obese.  Imaging: None - Reviewed  Patient taking by mouth.  Liver Function  Recent Labs      01/22/18   0839  01/22/18   1308  01/23/18   0448   ALTSGPT   --    --   10   ASTSGOT   --    --   22    ALKPHOSPHAT   --    --   101*   TBILIRUBIN   --    --   1.1   LIPASE   --    --   407*   GLUCOSE  572*  528*  179*       Heme:  Recent Labs      18   1308  188  18   0242   RBC   --   5.12  4.99   HEMOGLOBIN   --   12.9  12.0   HEMATOCRIT   --   40.7  38.9   PLATELETCT   --   139*  132*   PROTHROMBTM  14.9*   --    --    INR  1.20*   --    --        Infectious Disease:  Temp  Av.2 °C (98.9 °F)  Min: 36.5 °C (97.7 °F)  Max: 37.6 °C (99.7 °F)  Micro: no cultures pending  Recent Labs      18   024   WBC  11.3*  10.1   ASTSGOT  22   --    ALTSGPT  10   --    ALKPHOSPHAT  101*   --    TBILIRUBIN  1.1   --      Current Facility-Administered Medications   Medication Dose Frequency Provider Last Rate Last Dose   • Influenza Vaccine Quad pf injection 0.5 mL  0.5 mL Once PRN Eric Dunne, D.O.       • clonazepam (KLONOPIN) tablet 1 mg  1 mg TID Barry Villarreal M.D.   1 mg at 18 2020   • duloxetine (CYMBALTA) capsule 30 mg  30 mg BID Barry Villarreal M.D.   30 mg at 18 1102   • insulin NPH (HUMULIN,NOVOLIN) injection 20 Units  20 Units BID INSULIN Barry Villarreal M.D.   20 Units at 18 1809   • enoxaparin (LOVENOX) inj 40 mg  40 mg DAILY Barry Villarreal M.D.   40 mg at 18 0952   • ziprasidone (GEODON) injection 20 mg  20 mg Q4HRS PRN Eric Dunne D.O.       • spironolactone (ALDACTONE) tablet 100 mg  100 mg DAILY Ashlyn Agarwal M.D.   100 mg at 18 0953   • simvastatin (ZOCOR) tablet 20 mg  20 mg Q EVENING Ashlyn Agarwal M.D.   20 mg at 18   • omeprazole (PRILOSEC) capsule 20 mg  20 mg DAILY Ashlyn Agarwal M.D.   20 mg at 1854   • metoprolol (LOPRESSOR) tablet 50 mg  50 mg BID Ashlyn Agarwal M.D.   50 mg at 18   • lisinopril (PRINIVIL) 10 MG tablet 10 mg  10 mg DAILY Ashlyn Agarwal M.D.   10 mg at 1852   • senna-docusate (PERICOLACE or SENOKOT S) 8.6-50 MG per tablet 2 Tab  2  Tab BID Ashlyn Agarwal M.D.   2 Tab at 01/24/18 0955    And   • polyethylene glycol/lytes (MIRALAX) PACKET 1 Packet  1 Packet QDAY PRN Ashlyn Agarwal M.D.        And   • magnesium hydroxide (MILK OF MAGNESIA) suspension 30 mL  30 mL QDAY PRN Ashlyn Agarawl M.D.        And   • bisacodyl (DULCOLAX) suppository 10 mg  10 mg QDAY PRN Ashlyn Agarwal M.D.       • 1/2 NS infusion   Continuous Barry F Felicitas Villarreal M.D. 50 mL/hr at 01/24/18 2040     • ondansetron (ZOFRAN) syringe/vial injection 4 mg  4 mg Q4HRS PRKWADWO Agarwal M.D.   4 mg at 01/24/18 1925   • ondansetron (ZOFRAN ODT) dispertab 4 mg  4 mg Q4HRS PRN Ashlyn Agarwal M.D.   Stopped at 01/22/18 1128   • promethazine (PHENERGAN) tablet 12.5-25 mg  12.5-25 mg Q4HRS PRN Ashlyn Agarwal M.D.       • promethazine (PHENERGAN) suppository 12.5-25 mg  12.5-25 mg Q4HRS PRN Ashlyn Agarwal M.D.       • prochlorperazine (COMPAZINE) injection 5-10 mg  5-10 mg Q4HRS PRN Ashlyn Agarwal M.D.   10 mg at 01/22/18 1130   • insulin regular (HUMULIN R) injection 3-14 Units  3-14 Units 4X/DAY ACHS Guerrero Hearn M.D.   Stopped at 01/24/18 2100    And   • glucose 4 g chewable tablet 16 g  16 g Q15 MIN PRN Guerrero Hearn M.D.        And   • dextrose 50% (D50W) injection 25 mL  25 mL Q15 MIN PRN Guerrero Hearn M.D.       • HYDROmorphone (DILAUDID) injection 0.5-1 mg  0.5-1 mg Q2HRS PRN Guerrero Hearn M.D.   1 mg at 01/24/18 1820     Last reviewed on 1/22/2018  2:16 PM by Kecia Mehta [facility]    Quality  Measures:   Labs reviewed and Medications reviewed   Walls catheter: No Walls and Critically Ill - Requiring Accurate Measurement of Urinary Output   Central line in place: Sepsis       DVT prophylaxis - mechanical: SCDs   Ulcer prophylaxis: Yes      Assessment and plan:    1. Nonketotic hyperosmolar syndrome.    -Patient off protocol at this time.  -Because of the timing of the NPH will continue similar dosing schedule NPH and sliding scale and set of long-acting Lantus. Patient  was on 80 units of Lantus at night, in the past.  -Because of poor oral intake, will place on 20 units of NPH twice a day.    2.  Agitation.    -More controlled on mood stabilizing therapy. Appreciate psychiatric evaluation.  -Patient has been inappropriate and pulled out catheter today as well as refused medications.  -Geodon has been given for agitation intermittently, but did not require overnight.    3. History of DVT.     -Patient has not been compliant with these are also therapy.   -There was remote DVT history and therefore we have agreed that patient would unlikely require anticoagulation at this time.    4. Pseudohyponatremia related to severe hyperglycemia.     -Please see #1 above.    5.  Hypernatremia.    -IV fluids have been adjusted. We'll continue to follow and adjust IV fluids as necessary.    Discussed patient condition and risk of morbidity and/or mortality with Charge nurse / hot rounds, and multidisciplinary Rounds    Eric Dunne D.O.

## 2018-01-25 NOTE — CARE PLAN
Problem: Venous Thromboembolism (VTW)/Deep Vein Thrombosis (DVT) Prevention:  Goal: Patient will participate in Venous Thrombosis (VTE)/Deep Vein Thrombosis (DVT)Prevention Measures  Outcome: PROGRESSING AS EXPECTED  Tolerating the BLE SCD's. Up OOB frequently. ROM provided by pt. Frequently.

## 2018-01-25 NOTE — CARE PLAN
Problem: Safety  Goal: Will remain free from injury  Outcome: MET Date Met: 01/24/18  Pt remains free from falls and injury during shift. Restraints d/kimmie and pt calling appropriately with assistance. Chair alarm and bed alarm used and pt monitored for safety.     Problem: Mobility  Goal: Risk for activity intolerance will decrease  Outcome: PROGRESSING AS EXPECTED  Pt mobilized OOB to chair for breakfast, lunch, and PRN to commode with x1 assist. Unsteady gait and general weakness.

## 2018-01-26 ENCOUNTER — PATIENT OUTREACH (OUTPATIENT)
Dept: HEALTH INFORMATION MANAGEMENT | Facility: OTHER | Age: 58
End: 2018-01-26

## 2018-01-26 VITALS
HEIGHT: 68 IN | DIASTOLIC BLOOD PRESSURE: 79 MMHG | WEIGHT: 174.6 LBS | OXYGEN SATURATION: 98 % | SYSTOLIC BLOOD PRESSURE: 155 MMHG | TEMPERATURE: 97.1 F | BODY MASS INDEX: 26.46 KG/M2 | RESPIRATION RATE: 16 BRPM | HEART RATE: 94 BPM

## 2018-01-26 PROBLEM — F17.200 SMOKING: Status: ACTIVE | Noted: 2018-01-26

## 2018-01-26 LAB
ALBUMIN SERPL BCP-MCNC: 2.7 G/DL (ref 3.2–4.9)
ALBUMIN/GLOB SERPL: 1.1 G/DL
ALP SERPL-CCNC: 114 U/L (ref 30–99)
ALT SERPL-CCNC: 11 U/L (ref 2–50)
ANION GAP SERPL CALC-SCNC: 9 MMOL/L (ref 0–11.9)
AST SERPL-CCNC: 18 U/L (ref 12–45)
BILIRUB SERPL-MCNC: 0.9 MG/DL (ref 0.1–1.5)
BUN SERPL-MCNC: 16 MG/DL (ref 8–22)
CALCIUM SERPL-MCNC: 8.2 MG/DL (ref 8.5–10.5)
CHLORIDE SERPL-SCNC: 110 MMOL/L (ref 96–112)
CO2 SERPL-SCNC: 21 MMOL/L (ref 20–33)
CREAT SERPL-MCNC: 0.73 MG/DL (ref 0.5–1.4)
ERYTHROCYTE [DISTWIDTH] IN BLOOD BY AUTOMATED COUNT: 38.3 FL (ref 35.9–50)
GLOBULIN SER CALC-MCNC: 2.4 G/DL (ref 1.9–3.5)
GLUCOSE BLD-MCNC: 102 MG/DL (ref 65–99)
GLUCOSE BLD-MCNC: 121 MG/DL (ref 65–99)
GLUCOSE SERPL-MCNC: 125 MG/DL (ref 65–99)
HCT VFR BLD AUTO: 40.2 % (ref 37–47)
HGB BLD-MCNC: 12.5 G/DL (ref 12–16)
MCH RBC QN AUTO: 24.7 PG (ref 27–33)
MCHC RBC AUTO-ENTMCNC: 31.1 G/DL (ref 33.6–35)
MCV RBC AUTO: 79.3 FL (ref 81.4–97.8)
PLATELET # BLD AUTO: 131 K/UL (ref 164–446)
PMV BLD AUTO: 11.7 FL (ref 9–12.9)
POTASSIUM SERPL-SCNC: 3.3 MMOL/L (ref 3.6–5.5)
PROT SERPL-MCNC: 5.1 G/DL (ref 6–8.2)
RBC # BLD AUTO: 5.07 M/UL (ref 4.2–5.4)
SODIUM SERPL-SCNC: 140 MMOL/L (ref 135–145)
WBC # BLD AUTO: 10.4 K/UL (ref 4.8–10.8)

## 2018-01-26 PROCEDURE — A9270 NON-COVERED ITEM OR SERVICE: HCPCS | Performed by: HOSPITALIST

## 2018-01-26 PROCEDURE — 85027 COMPLETE CBC AUTOMATED: CPT

## 2018-01-26 PROCEDURE — 36415 COLL VENOUS BLD VENIPUNCTURE: CPT

## 2018-01-26 PROCEDURE — 700102 HCHG RX REV CODE 250 W/ 637 OVERRIDE(OP): Performed by: HOSPITALIST

## 2018-01-26 PROCEDURE — 82962 GLUCOSE BLOOD TEST: CPT

## 2018-01-26 PROCEDURE — 99407 BEHAV CHNG SMOKING > 10 MIN: CPT | Performed by: INTERNAL MEDICINE

## 2018-01-26 PROCEDURE — 80053 COMPREHEN METABOLIC PANEL: CPT

## 2018-01-26 PROCEDURE — 700111 HCHG RX REV CODE 636 W/ 250 OVERRIDE (IP): Performed by: HOSPITALIST

## 2018-01-26 PROCEDURE — 99239 HOSP IP/OBS DSCHRG MGMT >30: CPT | Mod: 25 | Performed by: INTERNAL MEDICINE

## 2018-01-26 RX ORDER — INSULIN GLARGINE 100 [IU]/ML
20 INJECTION, SOLUTION SUBCUTANEOUS 2 TIMES DAILY
Status: DISCONTINUED | OUTPATIENT
Start: 2018-01-26 | End: 2018-01-26 | Stop reason: HOSPADM

## 2018-01-26 RX ADMIN — DULOXETINE HYDROCHLORIDE 30 MG: 30 CAPSULE, DELAYED RELEASE ORAL at 11:15

## 2018-01-26 RX ADMIN — ENOXAPARIN SODIUM 40 MG: 100 INJECTION SUBCUTANEOUS at 09:13

## 2018-01-26 RX ADMIN — LISINOPRIL 10 MG: 20 TABLET ORAL at 09:13

## 2018-01-26 RX ADMIN — STANDARDIZED SENNA CONCENTRATE AND DOCUSATE SODIUM 2 TABLET: 8.6; 5 TABLET, FILM COATED ORAL at 09:13

## 2018-01-26 RX ADMIN — SPIRONOLACTONE 100 MG: 100 TABLET, FILM COATED ORAL at 09:13

## 2018-01-26 RX ADMIN — METOPROLOL TARTRATE 50 MG: 50 TABLET, FILM COATED ORAL at 09:13

## 2018-01-26 RX ADMIN — INSULIN HUMAN 20 UNITS: 100 INJECTION, SUSPENSION SUBCUTANEOUS at 05:59

## 2018-01-26 ASSESSMENT — ENCOUNTER SYMPTOMS
CONSTIPATION: 0
COUGH: 0
FALLS: 0
DEPRESSION: 0
WHEEZING: 0
HEARTBURN: 0
SHORTNESS OF BREATH: 0
PALPITATIONS: 0
SEIZURES: 0
DIZZINESS: 0
BACK PAIN: 0
FEVER: 0
TREMORS: 0
WEIGHT LOSS: 0
VOMITING: 0
HEADACHES: 0
CHILLS: 0
BLURRED VISION: 0
DIARRHEA: 0
SPEECH CHANGE: 0
ABDOMINAL PAIN: 0
SPUTUM PRODUCTION: 0
NECK PAIN: 0
WEAKNESS: 0
EYE PAIN: 0
PND: 0
NAUSEA: 0

## 2018-01-26 ASSESSMENT — PAIN SCALES - GENERAL: PAINLEVEL_OUTOF10: 0

## 2018-01-26 ASSESSMENT — LIFESTYLE VARIABLES
DO YOU DRINK ALCOHOL: NO
SUBSTANCE_ABUSE: 0

## 2018-01-26 NOTE — PROGRESS NOTES
Assumed care of pt, discussed plan of care. A&Ox4. RA, tolerates well. RML, RLL, LLL diminished. Denies pain. Last BM, 01/25. Continent; urinary stress incontinence at times. Skin intact; redness, bruising to BUE; healing ulcer to coccyx. On diabetic diet. Takes pills whole without difficulty. IV, CDI, SL. Fall precautions in place; bed lowest position, bed alarm on, treaded socks on, call light within reach. All needs met at this time.

## 2018-01-26 NOTE — PROGRESS NOTES
[facility] Hospitalist Progress Note    Date of Service: 1/26/2018    Chief Complaint  57 y.o. female admitted 1/21/2018 with DKA    Interval Problem Update    1/26 patient said she wants to go home this morning however patient also is not very compliant with medication. Patient said she has a lot of insulin at home but she hasn't been using it. Asking about outpatient follow-up, patient says she has a PCP 30 years ago and starting there prescribing insulin for the patient and she has been going to the other clinic randomly to get insulin failed. However patient could not tell me what exactly the clinic as. At this moment, and is concerned that patient will not have a secure discharge plan. Patient came to the ICU with dangerous health condition. We will have to make sure patient have a good discharge plan. Patient otherwise denies fever, chills, nausea, vomiting, adb pain, SOB, CP, headache, constipation, diarrhea, cough, or sputum.      Consultants/Specialty  Critical care  Psychiatry    Disposition  To be determined        Review of Systems   Constitutional: Negative for chills, fever and weight loss.   HENT: Negative for congestion, ear discharge, ear pain, hearing loss and nosebleeds.    Eyes: Negative for blurred vision and pain.   Respiratory: Negative for cough, sputum production, shortness of breath and wheezing.    Cardiovascular: Negative for chest pain, palpitations, leg swelling and PND.   Gastrointestinal: Negative for abdominal pain, constipation, diarrhea, heartburn, nausea and vomiting.   Genitourinary: Negative for dysuria, frequency and hematuria.   Musculoskeletal: Negative for back pain, falls, joint pain and neck pain.   Skin: Negative for rash.   Neurological: Negative for dizziness, tremors, speech change, seizures, weakness and headaches.   Psychiatric/Behavioral: Negative for depression, substance abuse and suicidal ideas.      Physical Exam  Laboratory/Imaging   Hemodynamics  Temp (24hrs),  Av.4 °C (97.5 °F), Min:36.2 °C (97.1 °F), Max:36.8 °C (98.2 °F)   Temperature: 36.2 °C (97.1 °F)  Pulse  Av.3  Min: 75  Max: 150 Heart Rate (Monitored): 82  Blood Pressure: 155/79 (nurse aware), NIBP: 152/67      Respiratory      Respiration: 16, Pulse Oximetry: 98 %        RUL Breath Sounds: Clear, RML Breath Sounds: Diminished, RLL Breath Sounds: Diminished, EDWIN Breath Sounds: Clear, LLL Breath Sounds: Diminished    Fluids    Intake/Output Summary (Last 24 hours) at 18 1340  Last data filed at 18 2137   Gross per 24 hour   Intake              520 ml   Output                0 ml   Net              520 ml       Nutrition  Orders Placed This Encounter   Procedures   • DIET ORDER     Standing Status:   Standing     Number of Occurrences:   1     Order Specific Question:   Diet:     Answer:   Diabetic [3]     Physical Exam   Constitutional: No distress.   HENT:   Head: Normocephalic and atraumatic.   Right Ear: External ear normal.   Left Ear: External ear normal.   Mouth/Throat: No oropharyngeal exudate.   Eyes: Right eye exhibits no discharge. Left eye exhibits no discharge. No scleral icterus.   Neck: Neck supple. No JVD present. No tracheal deviation present.   Cardiovascular: Normal rate and regular rhythm.  Exam reveals no gallop and no friction rub.    Pulmonary/Chest: Effort normal and breath sounds normal. No stridor. No respiratory distress. She has no wheezes. She exhibits no tenderness.   Abdominal: Soft. Bowel sounds are normal. She exhibits no distension and no mass. There is no rebound and no guarding.   Musculoskeletal: She exhibits no edema or tenderness.   Neurological: She is alert. No cranial nerve deficit. She exhibits normal muscle tone.   Oriented to self and place   Skin: Skin is warm and dry. She is not diaphoretic. No cyanosis. Nails show no clubbing.   Psychiatric: Her mood appears not anxious. Cognition and memory are impaired. She expresses impulsivity. She exhibits  abnormal recent memory.   Nursing note and vitals reviewed.      Recent Labs      01/25/18   0242  01/26/18   0459   WBC  10.1  10.4   RBC  4.99  5.07   HEMOGLOBIN  12.0  12.5   HEMATOCRIT  38.9  40.2   MCV  78.0*  79.3*   MCH  24.0*  24.7*   MCHC  30.8*  31.1*   RDW  38.3  38.3   PLATELETCT  132*  131*   MPV  10.7  11.7     Recent Labs      01/25/18   0242  01/26/18   0459   SODIUM  139  140   POTASSIUM  3.5*  3.3*   CHLORIDE  109  110   CO2  24  21   GLUCOSE  78  125*   BUN  20  16   CREATININE  0.87  0.73   CALCIUM  8.0*  8.2*                      Assessment/Plan     * DKA (diabetic ketoacidoses) (CMS-[facility])   Assessment & Plan    Resolved   Change insulin to Lantus 20 iu twice a day  continue sliding scale insulin and monitor blood sugars  Patient social condition is very concerned about being noncompliant in the future and cause fetal health condition.  to help with social condition to make sure a safe discharge plan.        Metabolic encephalopathy   Assessment & Plan    Improving  The patient is a very unreliable historian.   CT head neg, ammonia is normal   No focal deficits on exam  SW for social help  Evaluated by psychiatry   Geodon as needed  Minimize sedating agents  Frequent orientation  Slowly improving        Hyperlipidemia- (present on admission)   Assessment & Plan    Continue Zocor.        Hypertension- (present on admission)   Assessment & Plan    Stable on metoprolol and lisinopril        History of DVT (deep vein thrombosis)- (present on admission)   Assessment & Plan    Lower extremity duplex was negative  Need record [facility]. We'll decide if patient needs to restart anticoagulation therapy when we have the record.       Smoking:  - Smoking cessation education provided  - Nicotine patch         Reviewed items::  Labs reviewed, Medications reviewed and Radiology images reviewed  Walls catheter::  No Walls  DVT prophylaxis pharmacological::  Enoxaparin  (Lovenox)     Spent 14 minutes on tobacco cessation counseling including nicotine patches, gum, and dangers of smoking.    The pt indicated that will quit.     [id number] (smoking and tobacco cessation counseling visit; intensive, greater than 10 minutes).

## 2018-01-26 NOTE — PROGRESS NOTES
"Patient left today AMA around 1430. Patient was educated on why she was here and the risks of leaving against medical advice. RN informed MD of patient's wish and MD spoke with patient and also educated her on the risks involved. MD did not want to discharge but said patient could leave AMA. Patient decided to still leave AMA. RN and MD informed patient that if she needs to come back she must go through ED. RN asked patient if she had any personal belongings to take with her and she replied \"i do, but im going to leave it here and ill pick it up tomorrow.\"  "

## 2018-01-26 NOTE — PROGRESS NOTES
2 RN skin check complete. Bruising along arms noted from IV and lab draws. Small skin tear on bottom almost healed over. Per RN report and patient report, the tear is healing well from pervious days. Pt is alert and oriented. Patient resting comfortably in bed with call light in reach.

## 2018-01-26 NOTE — DISCHARGE PLANNING
"Care Transition Team Assessment      Patient stated she stopped taking her medication last month as she was depressed.  When asked for reasons, patient stated she was overwhelmed with her brother dying. \"I feel like I have lost all of my family except my sister.  As my son commited  suicide last year. And my sister and parents are dead.  I only have a sister left.\"  Patient is not seeing a mental health therapist, but is open to this.  Provided information to her for [facility].  Patient has a primary doctor, hasn't seen him since 10/17.  I asked if she would be okay with [facility] scheduling an appointment, patient agreed.      PC to Scheduling: they will set up a time for patient with her PCP.     Information Source  Orientation : Oriented x 4  Information Given By: Patient    Readmission Evaluation  Is this a readmission?: No    Elopement Risk  Legal Hold: No  Ambulatory or Self Mobile in Wheelchair: Yes  Disoriented: No  Psychiatric Symptoms: Impulsivity-at Risk for Elopement  Elopement this Admit: No  Vocalizing Wanting to Leave: No  Displays Behaviors, Body Language Wanting to Leave: No-Not at Risk for Elopement  Elopement Risk: Not at Risk for Elopement    Interdisciplinary Discharge Planning  Does Admitting Nurse Feel This Could be a Complex Discharge?: Yes  Patient or legal guardian wants to designate a caregiver (see row info): No  Support Systems: Family Member(s)  Able to Return to Previous ADL's: Future Time w/Therapy  Assistance Needed: Unknown at this Time  Durable Medical Equipment: Unknown    Discharge Preparedness  What is your plan after discharge?: Other (comment) (Wants to go home)  What are your discharge supports?: Sibling  Prior Functional Level: Ambulatory, Drives Self, Independent with Activities of Daily Living, Independent with Medication Management  Difficulity with ADLs: None  Difficulity with IADLs: None    Functional Assesment  Prior Functional Level: Ambulatory, Drives Self, " Independent with Activities of Daily Living, Independent with Medication Management    Finances  Financial Barriers to Discharge: No ($1340)  Prescription Coverage: Yes    Vision / Hearing Impairment  Right Eye Vision: Impaired, Wears Glasses  Left Eye Vision: Impaired, Wears Glasses    Values / Beliefs / Concerns  Values / Beliefs Concerns : No    Advance Directive  Advance Directive?: None  Advance Directive offered?: AD Booklet refused    Domestic Abuse  Have you ever been the victim of abuse or violence?: No  Physical Abuse or Sexual Abuse: No  Verbal Abuse or Emotional Abuse: No  Possible Abuse Reported to:: Not Applicable    Psychological Assessment  History of Substance Abuse: None  History of Psychiatric Problems: No  Non-compliant with Treatment: No  Newly Diagnosed Illness: No    Discharge Risks or Barriers  Discharge risks or barriers?: Non-adherence to medication or treatment  Patient risk factors: Mental health, Noncompliance    Anticipated Discharge Information  Anticipated discharge disposition: Home

## 2018-01-26 NOTE — DISCHARGE PLANNING
Medical Social Work    TRISTA received tc from EPS worker (Perri Mcleod [phone]) for pt's / roommate Ki Couch. She stated that she is assigned to Ki's case and would like to be updated on pt's d/c plan. [facility] updated unit [facility] Alvarado to f/u with EPS.

## 2018-01-26 NOTE — PROGRESS NOTES
[facility] Hospitalist Progress Note    Date of Service: 1/26/2018    Chief Complaint  57 y.o. female admitted 1/21/2018 with DKA    Interval Problem Update    1/26 patient said she wants to go home this morning however patient also is not very compliant with medication. Patient said she has a lot of insulin at home but she hasn't been using it. Asking about outpatient follow-up, patient says she has a PCP 30 years ago and starting there prescribing insulin for the patient and she has been going to the other clinic randomly to get insulin failed. However patient could not tell me what exactly the clinic as. At this moment, and is concerned that patient will not have a secure discharge plan. Patient came to the ICU with dangerous health condition. We will have to make sure patient have a good discharge plan. Patient otherwise denies fever, chills, nausea, vomiting, adb pain, SOB, CP, headache, constipation, diarrhea, cough, or sputum.      Consultants/Specialty  Critical care  Psychiatry    Disposition  To be determined        Review of Systems   Constitutional: Negative for chills, fever and weight loss.   HENT: Negative for congestion, ear discharge, ear pain, hearing loss and nosebleeds.    Eyes: Negative for blurred vision and pain.   Respiratory: Negative for cough, sputum production, shortness of breath and wheezing.    Cardiovascular: Negative for chest pain, palpitations, leg swelling and PND.   Gastrointestinal: Negative for abdominal pain, constipation, diarrhea, heartburn, nausea and vomiting.   Genitourinary: Negative for dysuria, frequency and hematuria.   Musculoskeletal: Negative for back pain, falls, joint pain and neck pain.   Skin: Negative for rash.   Neurological: Negative for dizziness, tremors, speech change, seizures, weakness and headaches.   Psychiatric/Behavioral: Negative for depression, substance abuse and suicidal ideas.      Physical Exam  Laboratory/Imaging   Hemodynamics  Temp (24hrs),  Av.6 °C (97.9 °F), Min:36.2 °C (97.1 °F), Max:36.8 °C (98.2 °F)   Temperature: 36.8 °C (98.2 °F)  Pulse  Av.5  Min: 75  Max: 150 Heart Rate (Monitored): 82  Blood Pressure: 142/78, NIBP: 152/67      Respiratory      Respiration: 18, Pulse Oximetry: 97 %        RUL Breath Sounds: Clear, RML Breath Sounds: Diminished, RLL Breath Sounds: Diminished, EDWIN Breath Sounds: Clear, LLL Breath Sounds: Diminished    Fluids    Intake/Output Summary (Last 24 hours) at 18 0758  Last data filed at 18 2137   Gross per 24 hour   Intake             1300 ml   Output                0 ml   Net             1300 ml       Nutrition  Orders Placed This Encounter   Procedures   • DIET ORDER     Standing Status:   Standing     Number of Occurrences:   1     Order Specific Question:   Diet:     Answer:   Diabetic [3]     Physical Exam   Constitutional: No distress.   HENT:   Head: Normocephalic and atraumatic.   Right Ear: External ear normal.   Left Ear: External ear normal.   Mouth/Throat: No oropharyngeal exudate.   Eyes: Right eye exhibits no discharge. Left eye exhibits no discharge. No scleral icterus.   Neck: Neck supple. No JVD present. No tracheal deviation present.   Cardiovascular: Normal rate and regular rhythm.  Exam reveals no gallop and no friction rub.    Pulmonary/Chest: Effort normal and breath sounds normal. No stridor. No respiratory distress. She has no wheezes. She exhibits no tenderness.   Abdominal: Soft. Bowel sounds are normal. She exhibits no distension and no mass. There is no rebound and no guarding.   Musculoskeletal: She exhibits no edema or tenderness.   Neurological: She is alert. No cranial nerve deficit. She exhibits normal muscle tone.   Oriented to self and place   Skin: Skin is warm and dry. She is not diaphoretic. No cyanosis. Nails show no clubbing.   Psychiatric: Her mood appears not anxious. Cognition and memory are impaired. She expresses impulsivity. She exhibits abnormal recent  memory.   Nursing note and vitals reviewed.      Recent Labs      01/25/18   0242  01/26/18   0459   WBC  10.1  10.4   RBC  4.99  5.07   HEMOGLOBIN  12.0  12.5   HEMATOCRIT  38.9  40.2   MCV  78.0*  79.3*   MCH  24.0*  24.7*   MCHC  30.8*  31.1*   RDW  38.3  38.3   PLATELETCT  132*  131*   MPV  10.7  11.7     Recent Labs      01/25/18   0242  01/26/18   0459   SODIUM  139  140   POTASSIUM  3.5*  3.3*   CHLORIDE  109  110   CO2  24  21   GLUCOSE  78  125*   BUN  20  16   CREATININE  0.87  0.73   CALCIUM  8.0*  8.2*                      Assessment/Plan     * DKA (diabetic ketoacidoses) (CMS-HCC)   Assessment & Plan    Resolved   Change insulin to Lantus 20 iu twice a day  continue sliding scale insulin and monitor blood sugars  Patient social condition is very concerned about being noncompliant in the future and cause fetal health condition.  to help with social condition to make sure a safe discharge plan.        Metabolic encephalopathy   Assessment & Plan    Improving  The patient is a very unreliable historian.   CT head neg, ammonia is normal   No focal deficits on exam  SW for social help  Evaluated by psychiatry   Geodon as needed  Minimize sedating agents  Frequent orientation  Slowly improving        Hyperlipidemia- (present on admission)   Assessment & Plan    Continue Zocor.        Hypertension- (present on admission)   Assessment & Plan    Stable on metoprolol and lisinopril        History of DVT (deep vein thrombosis)- (present on admission)   Assessment & Plan    Lower extremity duplex was negative    The patient has been noncompliant with her Xarelto  Will DC anticoagulation at this time and try to obtain records from [facility] where she was supposedly diagnosed with DVT  Given her history of noncompliance the risks of anticoagulation outweigh potential benefits at this time       Smoking:  - Smoking cessation education provided  - Nicotine patch         Reviewed items::  Labs  reviewed, Medications reviewed and Radiology images reviewed  Walls catheter::  No Walls  DVT prophylaxis pharmacological::  Enoxaparin (Lovenox)     Spent 14 minutes on tobacco cessation counseling including nicotine patches, gum, and dangers of smoking.    The pt indicated that will quit.     76814 (smoking and tobacco cessation counseling visit; intensive, greater than 10 minutes).

## 2018-01-26 NOTE — CARE PLAN
Problem: Nutritional:  Goal: Achieve adequate nutritional intake  Patient will consume >50% of meals   Outcome: PROGRESSING AS EXPECTED  Yesterday, pt ate <25% of breakfast, 50-75% of lunch and % of dinner.  Today, pt ate <25% of breakfast.  Boost Glucose Control supplement is in place with meals. Please encourage supplement if pt eats <50% of the meal. RD following

## 2018-01-26 NOTE — DISCHARGE SUMMARY
Hospital Medicine Discharge Note     Admit Date:  1/21/2018       Discharge Date:   1/26/2018    Attending Physician:  Ann Marie Ferrell     Diagnoses (includes active and resolved):       DKA (diabetic ketoacidoses) (CMS-HCC) POA: Yes    Metabolic encephalopathy POA: Yes    History of DVT (deep vein thrombosis) POA: Yes    Hypertension POA: Yes    Hyperlipidemia POA: Yes    Smoking POA: Yes    Acute kidney injury superimposed on chronic kidney disease (CMS-HCC) POA: Yes      Chief Complaint   Patient presents with   • High Blood Sugar       Hosiery of Present Illness  Patient admitted for DKA  Detailed info pls see H&P.    Hospital Summary (Brief Narrative):       Patient is a 57-year-old female with history of diabetes, hypertension admitted for DKA. Patient was treated in the ICU. Patient's DKA resolved. Likely due to noncompliance. Currently patient is still need a little bit of time to change the medication to back home dose however she wants to go home. Patient educated that she still wants to leave against medical advice. It is noticed that patient said she already had all her insulin at home. Patient does not want to take anticoagulation therapy. Patient signed against medical advice. Of note patient currently is competent to make medical decision. Patient is AAO ×4.     Consultants:      pulm    Procedures:        none    Discharge Medications:        (none )  Medication Reconciliation Completed    Patient is instructed to use Lantus 20-30 iu QHS and cont other  Home meds.    Disposition:   leave AMA    Diet:   Diabetic    Activity:   As tolerated    Code status:   Full code    Primary Care Provider:    Tiago Higgins M.D.    Follow up appointment details :      leave AMA  No follow-up provider specified.  Future Appointments  Date Time Provider Department Center   1/29/2018 2:00 PM Dave Stanton M.D. [facility] None       Pending Studies:        None    Time spent on discharge day patient visit: >35 minutes    Spent 14  minutes on tobacco cessation counseling including nicotine patches, gum, and dangers of smoking.     The pt indicated that will quit.      17425 (smoking and tobacco cessation counseling visit; intensive, greater than 10 minutes).  #################################################    Interval history/exam for day of discharge:    Vitals:    01/25/18 1700 01/25/18 1915 01/26/18 0415 01/26/18 0720   BP:  128/76 142/78 155/79   Pulse: 98 98 90 94   Resp: 20 18 18 16   Temp:  36.2 °C (97.1 °F) 36.8 °C (98.2 °F) 36.2 °C (97.1 °F)   SpO2: 96% 100% 97% 98%   Weight:       Height:         Weight/BMI: Body mass index is 26.77 kg/m².  Pulse Oximetry: 98 %, O2 (LPM): 0, O2 Delivery: None (Room Air)    Gen: AAOx3, NAD  Eyes: PELLA  Neck: no JVD, no lymphadenopathy  Cardia: RRR, no mrg  Lungs: CTAB, no rales, rhonci or wheezing  Abd: NABS, soft, non extended, no mass  EXT: No C/C/E, peripheral pulse 2+ b/l  Neuro: CN II-XII intact, non focal, reflex 2+ symmetrical  Skin: Intact, no lesion, warm  Psych: Appropriate.    Most Recent Labs:    Lab Results   Component Value Date/Time    WBC 10.4 01/26/2018 04:59 AM    RBC 5.07 01/26/2018 04:59 AM    HEMOGLOBIN 12.5 01/26/2018 04:59 AM    HEMATOCRIT 40.2 01/26/2018 04:59 AM    MCV 79.3 (L) 01/26/2018 04:59 AM    MCH 24.7 (L) 01/26/2018 04:59 AM    MCHC 31.1 (L) 01/26/2018 04:59 AM    MPV 11.7 01/26/2018 04:59 AM    NEUTSPOLYS 82.70 (H) 01/21/2018 10:54 PM    LYMPHOCYTES 6.70 (L) 01/21/2018 10:54 PM    MONOCYTES 9.90 01/21/2018 10:54 PM    EOSINOPHILS 0.00 01/21/2018 10:54 PM    BASOPHILS 0.20 01/21/2018 10:54 PM    HYPOCHROMIA 1+ 09/27/2013 04:45 AM    ANISOCYTOSIS 1+ 01/21/2018 10:54 PM      Lab Results   Component Value Date/Time    SODIUM 140 01/26/2018 04:59 AM    POTASSIUM 3.3 (L) 01/26/2018 04:59 AM    CHLORIDE 110 01/26/2018 04:59 AM    CO2 21 01/26/2018 04:59 AM    GLUCOSE 125 (H) 01/26/2018 04:59 AM    BUN 16 01/26/2018 04:59 AM    CREATININE 0.73 01/26/2018 04:59 AM      Lab  Results   Component Value Date/Time    ALTSGPT 11 01/26/2018 04:59 AM    ASTSGOT 18 01/26/2018 04:59 AM    ALKPHOSPHAT 114 (H) 01/26/2018 04:59 AM    TBILIRUBIN 0.9 01/26/2018 04:59 AM    LIPASE 407 (H) 01/23/2018 04:48 AM    ALBUMIN 2.7 (L) 01/26/2018 04:59 AM    GLOBULIN 2.4 01/26/2018 04:59 AM    INR 1.20 (H) 01/22/2018 01:08 PM     Lab Results   Component Value Date/Time    PROTHROMBTM 14.9 (H) 01/22/2018 01:08 PM    INR 1.20 (H) 01/22/2018 01:08 PM        Imaging/ Testing:      LE VENOUS DUPLEX (DVT)   Final Result      DX-CHEST-LIMITED (1 VIEW)   Final Result      1.  No acute cardiac or pulmonary abnormalities are identified.   2.  Satisfactory appearance of the right IJ catheter. There is no visible pneumothorax.      CT-HEAD W/O   Final Result      1.  Head CT without contrast within normal limits. No evidence of acute cerebral infarction, hemorrhage or mass lesion.          Instructions:      The patient was instructed to return to the ER in the event of worsening symptoms. I have counseled the patient on the importance of compliance and the patient has agreed to proceed with all medical recommendations and follow up plan indicated above.   The patient understands that all medications come with benefits and risks. Risks may include permanent injury or death and these risks can be minimized with close reassessment and monitoring.        This dictation was created using voice recognition software. The accuracy of the dictation is limited to the abilities of the software. Although every efforts have been used to decrease the error, I expect there may be some errors of grammar and possibly content.

## 2018-01-26 NOTE — PROGRESS NOTES
[facility] Hospitalist Progress Note    Date of Service: 2018    Chief Complaint  57 y.o. female admitted 2018 with DKA    Interval Problem Update    Tolerating diet, confused but more compliant with medical therapy    Consultants/Specialty  Critical care  Psychiatry    Disposition  To be determined        Review of Systems   Unable to perform ROS: Mental status change      Physical Exam  Laboratory/Imaging   Hemodynamics  Temp (24hrs), Av.9 °C (98.5 °F), Min:36.5 °C (97.7 °F), Max:37.4 °C (99.4 °F)   Temperature: 36.8 °C (98.2 °F)  Pulse  Av.8  Min: 75  Max: 150 Heart Rate (Monitored): 82  Blood Pressure: 130/67, NIBP: 128/65      Respiratory      Respiration: 20, Pulse Oximetry: 93 %        RUL Breath Sounds: Clear, RML Breath Sounds: Clear, RLL Breath Sounds: Diminished, EDWIN Breath Sounds: Clear, LLL Breath Sounds: Diminished    Fluids    Intake/Output Summary (Last 24 hours) at 18 1807  Last data filed at 18 1600   Gross per 24 hour   Intake             1640 ml   Output              100 ml   Net             1540 ml       Nutrition  Orders Placed This Encounter   Procedures   • DIET ORDER     Standing Status:   Standing     Number of Occurrences:   1     Order Specific Question:   Diet:     Answer:   Diabetic [3]     Physical Exam   Constitutional: She appears well-developed and well-nourished.   HENT:   Head: Normocephalic and atraumatic.   Right Ear: External ear normal.   Left Ear: External ear normal.   Mouth/Throat: No oropharyngeal exudate.   Eyes: Right eye exhibits no discharge. Left eye exhibits no discharge. No scleral icterus.   Neck: Neck supple. No JVD present. No tracheal deviation present.   Cardiovascular: Normal rate and regular rhythm.  Exam reveals no gallop and no friction rub.    No murmur heard.  Pulmonary/Chest: Effort normal and breath sounds normal. No stridor. No respiratory distress. She exhibits no tenderness.   Abdominal: Soft. Bowel sounds are normal. She  exhibits no distension. There is no tenderness. There is no rebound and no guarding.   Musculoskeletal: She exhibits no edema or tenderness.   Neurological: She is alert. No cranial nerve deficit. She exhibits normal muscle tone.   Oriented to self and place   Skin: Skin is warm and dry. She is not diaphoretic. No cyanosis. Nails show no clubbing.   Psychiatric: Her mood appears not anxious. Cognition and memory are impaired. She expresses impulsivity. She exhibits abnormal recent memory.   Nursing note and vitals reviewed.      Recent Labs      01/23/18   0448  01/25/18   0242   WBC  11.3*  10.1   RBC  5.12  4.99   HEMOGLOBIN  12.9  12.0   HEMATOCRIT  40.7  38.9   MCV  79.5*  78.0*   MCH  25.2*  24.0*   MCHC  31.7*  30.8*   RDW  41.1  38.3   PLATELETCT  139*  132*   MPV  11.8  10.7     Recent Labs      01/23/18   0448  01/25/18   0242   SODIUM  151*  139   POTASSIUM  3.8  3.5*   CHLORIDE  117*  109   CO2  25  24   GLUCOSE  179*  78   BUN  30*  20   CREATININE  1.05  0.87   CALCIUM  8.2*  8.0*             Recent Labs      01/23/18   0448   TRIGLYCERIDE  220*          Assessment/Plan     * DKA (diabetic ketoacidoses) (CMS-HCC)   Assessment & Plan    Resolved   continue  NPH 20 BID  continue sliding scale insulin and monitor blood sugars          Metabolic encephalopathy   Assessment & Plan    CT head neg, ammonia is normal   No focal deficits on exam  We'll try to obtain more information about her baseline from family  Evaluated by psychiatry   Geodon as needed  Minimize sedating agents  Frequent orientation  Slowly improving        Hyperlipidemia- (present on admission)   Assessment & Plan    Continue Zocor.        Hypertension- (present on admission)   Assessment & Plan    Stable on metoprolol and lisinopril        History of DVT (deep vein thrombosis)- (present on admission)   Assessment & Plan    Lower extremity duplex was negative      The patient has been noncompliant with her Xarelto  Will DC anticoagulation  at this time and try to obtain records from [facility] where she was supposedly diagnosed with DVT  Given her history of noncompliance the risks of anticoagulation outweigh potential benefits at this time              Reviewed items::  Labs reviewed, Medications reviewed and Radiology images reviewed  Walls catheter::  No Walls  DVT prophylaxis pharmacological::  Enoxaparin (Lovenox)

## 2018-02-01 ENCOUNTER — APPOINTMENT (OUTPATIENT)
Dept: RADIOLOGY | Facility: MEDICAL CENTER | Age: 58
DRG: 314 | End: 2018-02-01
Attending: EMERGENCY MEDICINE
Payer: MEDICARE

## 2018-02-01 ENCOUNTER — HOSPITAL ENCOUNTER (INPATIENT)
Facility: MEDICAL CENTER | Age: 58
LOS: 2 days | DRG: 314 | End: 2018-02-03
Attending: EMERGENCY MEDICINE | Admitting: INTERNAL MEDICINE
Payer: MEDICARE

## 2018-02-01 ENCOUNTER — RESOLUTE PROFESSIONAL BILLING HOSPITAL PROF FEE (OUTPATIENT)
Dept: HOSPITALIST | Facility: MEDICAL CENTER | Age: 58
End: 2018-02-01
Payer: MEDICARE

## 2018-02-01 DIAGNOSIS — G47.00 INSOMNIA, UNSPECIFIED TYPE: ICD-10-CM

## 2018-02-01 PROBLEM — I95.9 HYPOTENSION: Status: ACTIVE | Noted: 2018-02-01

## 2018-02-01 PROBLEM — R41.82 ALTERED MENTAL STATE: Status: ACTIVE | Noted: 2018-02-01

## 2018-02-01 LAB
ALBUMIN SERPL BCP-MCNC: 2.9 G/DL (ref 3.2–4.9)
ALBUMIN/GLOB SERPL: 1.1 G/DL
ALP SERPL-CCNC: 117 U/L (ref 30–99)
ALT SERPL-CCNC: 25 U/L (ref 2–50)
AMMONIA PLAS-SCNC: 46 UMOL/L (ref 11–45)
AMPHET UR QL SCN: NEGATIVE
ANION GAP SERPL CALC-SCNC: 6 MMOL/L (ref 0–11.9)
APPEARANCE UR: CLEAR
AST SERPL-CCNC: 33 U/L (ref 12–45)
BACTERIA #/AREA URNS HPF: NEGATIVE /HPF
BARBITURATES UR QL SCN: NEGATIVE
BASOPHILS # BLD AUTO: 0.5 % (ref 0–1.8)
BASOPHILS # BLD: 0.07 K/UL (ref 0–0.12)
BENZODIAZ UR QL SCN: NEGATIVE
BILIRUB SERPL-MCNC: 0.4 MG/DL (ref 0.1–1.5)
BILIRUB UR QL STRIP.AUTO: NEGATIVE
BUN SERPL-MCNC: 16 MG/DL (ref 8–22)
BZE UR QL SCN: NEGATIVE
CALCIUM SERPL-MCNC: 8.1 MG/DL (ref 8.5–10.5)
CANNABINOIDS UR QL SCN: NEGATIVE
CHLORIDE SERPL-SCNC: 107 MMOL/L (ref 96–112)
CO2 SERPL-SCNC: 22 MMOL/L (ref 20–33)
COLOR UR: YELLOW
CREAT SERPL-MCNC: 1.3 MG/DL (ref 0.5–1.4)
EKG IMPRESSION: NORMAL
EOSINOPHIL # BLD AUTO: 0.14 K/UL (ref 0–0.51)
EOSINOPHIL NFR BLD: 1.1 % (ref 0–6.9)
EPI CELLS #/AREA URNS HPF: ABNORMAL /HPF
ERYTHROCYTE [DISTWIDTH] IN BLOOD BY AUTOMATED COUNT: 41.9 FL (ref 35.9–50)
FLUAV RNA SPEC QL NAA+PROBE: NEGATIVE
FLUBV RNA SPEC QL NAA+PROBE: NEGATIVE
GLOBULIN SER CALC-MCNC: 2.7 G/DL (ref 1.9–3.5)
GLUCOSE SERPL-MCNC: 105 MG/DL (ref 65–99)
GLUCOSE UR STRIP.AUTO-MCNC: 100 MG/DL
HCT VFR BLD AUTO: 36.3 % (ref 37–47)
HGB BLD-MCNC: 11.3 G/DL (ref 12–16)
HYALINE CASTS #/AREA URNS LPF: ABNORMAL /LPF
IMM GRANULOCYTES # BLD AUTO: 0.09 K/UL (ref 0–0.11)
IMM GRANULOCYTES NFR BLD AUTO: 0.7 % (ref 0–0.9)
KETONES UR STRIP.AUTO-MCNC: NEGATIVE MG/DL
LACTATE BLD-SCNC: 2 MMOL/L (ref 0.5–2)
LEUKOCYTE ESTERASE UR QL STRIP.AUTO: ABNORMAL
LYMPHOCYTES # BLD AUTO: 3.18 K/UL (ref 1–4.8)
LYMPHOCYTES NFR BLD: 24.9 % (ref 22–41)
MAGNESIUM SERPL-MCNC: 1.3 MG/DL (ref 1.5–2.5)
MCH RBC QN AUTO: 25.3 PG (ref 27–33)
MCHC RBC AUTO-ENTMCNC: 31.1 G/DL (ref 33.6–35)
MCV RBC AUTO: 81.4 FL (ref 81.4–97.8)
METHADONE UR QL SCN: NEGATIVE
MICRO URNS: ABNORMAL
MONOCYTES # BLD AUTO: 1.12 K/UL (ref 0–0.85)
MONOCYTES NFR BLD AUTO: 8.8 % (ref 0–13.4)
NEUTROPHILS # BLD AUTO: 8.18 K/UL (ref 2–7.15)
NEUTROPHILS NFR BLD: 64 % (ref 44–72)
NITRITE UR QL STRIP.AUTO: NEGATIVE
NRBC # BLD AUTO: 0 K/UL
NRBC BLD-RTO: 0 /100 WBC
OPIATES UR QL SCN: NEGATIVE
OXYCODONE UR QL SCN: NEGATIVE
PCP UR QL SCN: NEGATIVE
PH UR STRIP.AUTO: 5 [PH]
PHOSPHATE SERPL-MCNC: 4.4 MG/DL (ref 2.5–4.5)
PLATELET # BLD AUTO: 232 K/UL (ref 164–446)
PMV BLD AUTO: 10.3 FL (ref 9–12.9)
POTASSIUM SERPL-SCNC: 3.6 MMOL/L (ref 3.6–5.5)
PROPOXYPH UR QL SCN: NEGATIVE
PROT SERPL-MCNC: 5.6 G/DL (ref 6–8.2)
PROT UR QL STRIP: NEGATIVE MG/DL
RBC # BLD AUTO: 4.46 M/UL (ref 4.2–5.4)
RBC # URNS HPF: ABNORMAL /HPF
RBC UR QL AUTO: NEGATIVE
SODIUM SERPL-SCNC: 135 MMOL/L (ref 135–145)
SP GR UR STRIP.AUTO: 1.01
TROPONIN I SERPL-MCNC: <0.01 NG/ML (ref 0–0.04)
TSH SERPL DL<=0.005 MIU/L-ACNC: 0.53 UIU/ML (ref 0.38–5.33)
UROBILINOGEN UR STRIP.AUTO-MCNC: 0.2 MG/DL
WBC # BLD AUTO: 12.8 K/UL (ref 4.8–10.8)
WBC #/AREA URNS HPF: ABNORMAL /HPF

## 2018-02-01 PROCEDURE — 87040 BLOOD CULTURE FOR BACTERIA: CPT | Mod: 91

## 2018-02-01 PROCEDURE — 51701 INSERT BLADDER CATHETER: CPT

## 2018-02-01 PROCEDURE — 80053 COMPREHEN METABOLIC PANEL: CPT

## 2018-02-01 PROCEDURE — 700105 HCHG RX REV CODE 258: Performed by: EMERGENCY MEDICINE

## 2018-02-01 PROCEDURE — 81001 URINALYSIS AUTO W/SCOPE: CPT

## 2018-02-01 PROCEDURE — 93005 ELECTROCARDIOGRAM TRACING: CPT | Performed by: EMERGENCY MEDICINE

## 2018-02-01 PROCEDURE — 83735 ASSAY OF MAGNESIUM: CPT

## 2018-02-01 PROCEDURE — 93005 ELECTROCARDIOGRAM TRACING: CPT

## 2018-02-01 PROCEDURE — 87502 INFLUENZA DNA AMP PROBE: CPT

## 2018-02-01 PROCEDURE — 83605 ASSAY OF LACTIC ACID: CPT

## 2018-02-01 PROCEDURE — 85025 COMPLETE CBC W/AUTO DIFF WBC: CPT

## 2018-02-01 PROCEDURE — 99223 1ST HOSP IP/OBS HIGH 75: CPT | Mod: 25,AI | Performed by: INTERNAL MEDICINE

## 2018-02-01 PROCEDURE — 87086 URINE CULTURE/COLONY COUNT: CPT

## 2018-02-01 PROCEDURE — 80307 DRUG TEST PRSMV CHEM ANLYZR: CPT

## 2018-02-01 PROCEDURE — 96361 HYDRATE IV INFUSION ADD-ON: CPT

## 2018-02-01 PROCEDURE — 84484 ASSAY OF TROPONIN QUANT: CPT

## 2018-02-01 PROCEDURE — 84100 ASSAY OF PHOSPHORUS: CPT

## 2018-02-01 PROCEDURE — 96360 HYDRATION IV INFUSION INIT: CPT

## 2018-02-01 PROCEDURE — 770020 HCHG ROOM/CARE - TELE (206)

## 2018-02-01 PROCEDURE — 82140 ASSAY OF AMMONIA: CPT

## 2018-02-01 PROCEDURE — 71045 X-RAY EXAM CHEST 1 VIEW: CPT

## 2018-02-01 PROCEDURE — 700111 HCHG RX REV CODE 636 W/ 250 OVERRIDE (IP): Performed by: INTERNAL MEDICINE

## 2018-02-01 PROCEDURE — 700105 HCHG RX REV CODE 258: Performed by: INTERNAL MEDICINE

## 2018-02-01 PROCEDURE — 84443 ASSAY THYROID STIM HORMONE: CPT

## 2018-02-01 PROCEDURE — 99407 BEHAV CHNG SMOKING > 10 MIN: CPT | Performed by: INTERNAL MEDICINE

## 2018-02-01 PROCEDURE — 99285 EMERGENCY DEPT VISIT HI MDM: CPT

## 2018-02-01 PROCEDURE — 70450 CT HEAD/BRAIN W/O DYE: CPT

## 2018-02-01 RX ORDER — AMOXICILLIN 250 MG
2 CAPSULE ORAL 2 TIMES DAILY
Status: DISCONTINUED | OUTPATIENT
Start: 2018-02-01 | End: 2018-02-03 | Stop reason: HOSPADM

## 2018-02-01 RX ORDER — ONDANSETRON 2 MG/ML
4 INJECTION INTRAMUSCULAR; INTRAVENOUS EVERY 4 HOURS PRN
Status: DISCONTINUED | OUTPATIENT
Start: 2018-02-01 | End: 2018-02-03 | Stop reason: HOSPADM

## 2018-02-01 RX ORDER — SODIUM CHLORIDE 9 MG/ML
1000 INJECTION, SOLUTION INTRAVENOUS ONCE
Status: DISCONTINUED | OUTPATIENT
Start: 2018-02-01 | End: 2018-02-01

## 2018-02-01 RX ORDER — DEXTROSE MONOHYDRATE 25 G/50ML
25 INJECTION, SOLUTION INTRAVENOUS
Status: DISCONTINUED | OUTPATIENT
Start: 2018-02-01 | End: 2018-02-03 | Stop reason: HOSPADM

## 2018-02-01 RX ORDER — PROMETHAZINE HYDROCHLORIDE 25 MG/1
12.5-25 TABLET ORAL EVERY 4 HOURS PRN
Status: DISCONTINUED | OUTPATIENT
Start: 2018-02-01 | End: 2018-02-03 | Stop reason: HOSPADM

## 2018-02-01 RX ORDER — SODIUM CHLORIDE 9 MG/ML
1000 INJECTION, SOLUTION INTRAVENOUS ONCE
Status: COMPLETED | OUTPATIENT
Start: 2018-02-01 | End: 2018-02-01

## 2018-02-01 RX ORDER — PROMETHAZINE HYDROCHLORIDE 25 MG/1
12.5-25 SUPPOSITORY RECTAL EVERY 4 HOURS PRN
Status: DISCONTINUED | OUTPATIENT
Start: 2018-02-01 | End: 2018-02-03 | Stop reason: HOSPADM

## 2018-02-01 RX ORDER — BISACODYL 10 MG
10 SUPPOSITORY, RECTAL RECTAL
Status: DISCONTINUED | OUTPATIENT
Start: 2018-02-01 | End: 2018-02-03 | Stop reason: HOSPADM

## 2018-02-01 RX ORDER — ONDANSETRON 4 MG/1
4 TABLET, ORALLY DISINTEGRATING ORAL EVERY 4 HOURS PRN
Status: DISCONTINUED | OUTPATIENT
Start: 2018-02-01 | End: 2018-02-03 | Stop reason: HOSPADM

## 2018-02-01 RX ORDER — POLYETHYLENE GLYCOL 3350 17 G/17G
1 POWDER, FOR SOLUTION ORAL
Status: DISCONTINUED | OUTPATIENT
Start: 2018-02-01 | End: 2018-02-03 | Stop reason: HOSPADM

## 2018-02-01 RX ORDER — ZOLPIDEM TARTRATE 5 MG/1
5 TABLET ORAL NIGHTLY PRN
Status: DISCONTINUED | OUTPATIENT
Start: 2018-02-01 | End: 2018-02-03 | Stop reason: HOSPADM

## 2018-02-01 RX ORDER — SODIUM CHLORIDE 9 MG/ML
INJECTION, SOLUTION INTRAVENOUS CONTINUOUS
Status: DISCONTINUED | OUTPATIENT
Start: 2018-02-01 | End: 2018-02-03 | Stop reason: HOSPADM

## 2018-02-01 RX ORDER — ACETAMINOPHEN 325 MG/1
650 TABLET ORAL EVERY 6 HOURS PRN
Status: DISCONTINUED | OUTPATIENT
Start: 2018-02-01 | End: 2018-02-03 | Stop reason: HOSPADM

## 2018-02-01 RX ORDER — MAGNESIUM SULFATE HEPTAHYDRATE 40 MG/ML
4 INJECTION, SOLUTION INTRAVENOUS ONCE
Status: COMPLETED | OUTPATIENT
Start: 2018-02-01 | End: 2018-02-02

## 2018-02-01 RX ADMIN — MAGNESIUM SULFATE IN WATER 4 G: 40 INJECTION, SOLUTION INTRAVENOUS at 23:38

## 2018-02-01 RX ADMIN — SODIUM CHLORIDE 1000 ML: 9 INJECTION, SOLUTION INTRAVENOUS at 17:15

## 2018-02-01 RX ADMIN — SODIUM CHLORIDE 1000 ML: 9 INJECTION, SOLUTION INTRAVENOUS at 23:05

## 2018-02-01 RX ADMIN — SODIUM CHLORIDE 1000 ML: 9 INJECTION, SOLUTION INTRAVENOUS at 16:25

## 2018-02-01 NOTE — ED TRIAGE NOTES
".  Chief Complaint   Patient presents with   • Syncope     Possible multiple episodes   • Fall     Possible   Pt BIB EMS from home for possible multiple syncopal episodes and falls.  Onset X 6 days.  Pt reports no chest pain.  Pt states \" really bad headaches, I'm not hurt from falling. \"  Pt discharged from [facility] yesterday.  Pt slow to respond to verbal questions - Pt drifts off to sleep.  Pt denies any drug use.  FSBS 133.  Pt hypotensive en route 80/50 - received NS 500ml and Zofran 4mg IVP.       "

## 2018-02-01 NOTE — ED PROVIDER NOTES
ED Provider Note    Scribed for Darrick Peng D.O. by Tracie Manning. 2018  3:57 PM    Primary care provider: Tiago Higgins M.D.  Means of arrival: EMS  History obtained from: Patient  History limited by: Altered mentation     CHIEF COMPLAINT  Chief Complaint   Patient presents with   • Syncope     Possible multiple episodes   • Fall     Possible     HPI  Vin Maciel is a 57 y.o. female who presents to the Emergency Department for evaluation of syncope episodes. Patient was seen in the ED yesterday for these symptoms and was discharged. Patient denies fever or chest pain. Denies acute medical complaints. Denies drug use or alcohol use. The patient denies chest pain, nausea, vomiting, headache, loss of sensation or strength in arms or legs, shortness of breath.     Further history of present illness cannot be obtained due to the patient's altered mentation.     REVIEW OF SYSTEMS  Review of systems cannot be obtained due to the patient's altered mentation.    PAST MEDICAL HISTORY  Past Medical History:   Diagnosis Date   • DM (diabetes mellitus) (CMS-HCC)    • HTN (hypertension)      SURGICAL HISTORY  Past Surgical History:   Procedure Laterality Date   • APPENDECTOMY     • CHOLECYSTECTOMY     • HCHG  DELIVERY SER     • HYSTERECTOMY, TOTAL ABDOMINAL     • LAMINOTOMY     • OTHER      back, neck, shoulder surgeries   • OTHER ORTHOPEDIC SURGERY        SOCIAL HISTORY  Social History   Substance Use Topics   • Smoking status: Current Every Day Smoker     Packs/day: 1.00   • Smokeless tobacco: Never Used   • Alcohol use No      History   Drug Use No     FAMILY HISTORY  Family History   Problem Relation Age of Onset   • Diabetes Mother    • Heart Disease Father    • Cancer Father    • Diabetes Sister      CURRENT MEDICATIONS  No current facility-administered medications on file prior to encounter.      Current Outpatient Prescriptions on File Prior to Encounter   Medication Sig Dispense Refill   •  "metoprolol SR (TOPROL XL) 100 MG TABLET SR 24 HR Take 100 mg by mouth every day.     • rivaroxaban (XARELTO) 20 MG Tab tablet Take 20 mg by mouth with dinner.     • cephALEXin (KEFLEX) 500 MG Cap Take 500 mg by mouth 3 times a day. Start date: 12/28/17 10 days     • zolpidem (AMBIEN) 5 MG Tab Take 5 mg by mouth every bedtime.     • metformin (GLUCOPHAGE) 500 MG Tab Take 500 mg by mouth 2 times a day, with meals.     • duloxetine (CYMBALTA) 30 MG Cap DR Particles Take 30 mg by mouth 2 times a day.     • lisinopril (PRINIVIL) 10 MG Tab Take 10 mg by mouth every day.     • clonazepam (KLONOPIN) 1 MG TABS Take 1 mg by mouth 3 times a day.     • hydrOXYzine (ATARAX) 50 MG TABS Take 50 mg by mouth every 6 hours as needed for Anxiety.       ALLERGIES  Allergies   Allergen Reactions   • Sulfa Drugs Nausea     N/V nervousness     PHYSICAL EXAM  VITAL SIGNS: BP (!) 76/48   Pulse 82   Temp 36.4 °C (97.6 °F)   Resp 16   Ht 1.575 m (5' 2\")   Wt 77.1 kg (170 lb)   BMI 31.09 kg/m²     Nursing notes and vitals reviewed.  Constitutional: Well developed, Well nourished, Non-toxic appearance. Slight distress but slow   Eyes: PERRLA, EOMI, Conjunctiva normal, No discharge.   Cardiovascular: Normal heart rate, Normal rhythm, No murmurs, No rubs, No gallops.   Thorax & Lungs: No respiratory distress, No rales, No rhonchi, No wheezing, No chest tenderness.   Abdomen: Bowel sounds normal, Soft, No tenderness, No guarding, No rebound, No masses, No pulsatile masses.   Skin: Warm, Dry, No rash, Diffuse erythema non palpable on chest  Musculoskeletal: Intact distal pulses, No edema, No cyanosis, No clubbing. Good range of motion in all major joints. No tenderness to palpation or major deformities noted, no CVA tenderness, no midline back tenderness.   Neurologic: Alert & oriented x 2, Normal cognition, Cranial nerves II-XII are intact, No slurred speech, Negative finger to nose bilaterally, No pronator drift bilaterally,   strength " 5/5 bilaterally, Leg raise strength 5/5 bilaterally, Plantarflexion strength 5/5 bilaterally, Dorsiflexion strength 5/5 bilaterally, Deep tendon reflexes 2/4 upper and lower extremities bilaterally, Sensation intact throughout, No Nystagmus.  Normal motor function, Normal sensory function, No focal deficits noted.  Psychiatric: Affect normal for clinical presentation.    DIAGNOSTIC STUDIES/PROCEDURES    LABS  Results for orders placed or performed during the hospital encounter of 02/01/18   LACTIC ACID   Result Value Ref Range    Lactic Acid 2.0 0.5 - 2.0 mmol/L   CBC WITH DIFFERENTIAL   Result Value Ref Range    WBC 12.8 (H) 4.8 - 10.8 K/uL    RBC 4.46 4.20 - 5.40 M/uL    Hemoglobin 11.3 (L) 12.0 - 16.0 g/dL    Hematocrit 36.3 (L) 37.0 - 47.0 %    MCV 81.4 81.4 - 97.8 fL    MCH 25.3 (L) 27.0 - 33.0 pg    MCHC 31.1 (L) 33.6 - 35.0 g/dL    RDW 41.9 35.9 - 50.0 fL    Platelet Count 232 164 - 446 K/uL    MPV 10.3 9.0 - 12.9 fL    Neutrophils-Polys 64.00 44.00 - 72.00 %    Lymphocytes 24.90 22.00 - 41.00 %    Monocytes 8.80 0.00 - 13.40 %    Eosinophils 1.10 0.00 - 6.90 %    Basophils 0.50 0.00 - 1.80 %    Immature Granulocytes 0.70 0.00 - 0.90 %    Nucleated RBC 0.00 /100 WBC    Neutrophils (Absolute) 8.18 (H) 2.00 - 7.15 K/uL    Lymphs (Absolute) 3.18 1.00 - 4.80 K/uL    Monos (Absolute) 1.12 (H) 0.00 - 0.85 K/uL    Eos (Absolute) 0.14 0.00 - 0.51 K/uL    Baso (Absolute) 0.07 0.00 - 0.12 K/uL    Immature Granulocytes (abs) 0.09 0.00 - 0.11 K/uL    NRBC (Absolute) 0.00 K/uL   COMP METABOLIC PANEL   Result Value Ref Range    Sodium 135 135 - 145 mmol/L    Potassium 3.6 3.6 - 5.5 mmol/L    Chloride 107 96 - 112 mmol/L    Co2 22 20 - 33 mmol/L    Anion Gap 6.0 0.0 - 11.9    Glucose 105 (H) 65 - 99 mg/dL    Bun 16 8 - 22 mg/dL    Creatinine 1.30 0.50 - 1.40 mg/dL    Calcium 8.1 (L) 8.5 - 10.5 mg/dL    AST(SGOT) 33 12 - 45 U/L    ALT(SGPT) 25 2 - 50 U/L    Alkaline Phosphatase 117 (H) 30 - 99 U/L    Total Bilirubin 0.4 0.1  - 1.5 mg/dL    Albumin 2.9 (L) 3.2 - 4.9 g/dL    Total Protein 5.6 (L) 6.0 - 8.2 g/dL    Globulin 2.7 1.9 - 3.5 g/dL    A-G Ratio 1.1 g/dL   URINALYSIS   Result Value Ref Range    Color Yellow     Character Clear     Specific Gravity 1.011 <1.035    Ph 5.0 5.0 - 8.0    Glucose 100 (A) Negative mg/dL    Ketones Negative Negative mg/dL    Protein Negative Negative mg/dL    Bilirubin Negative Negative    Urobilinogen, Urine 0.2 Negative    Nitrite Negative Negative    Leukocyte Esterase Trace (A) Negative    Occult Blood Negative Negative    Micro Urine Req Microscopic    MAGNESIUM   Result Value Ref Range    Magnesium 1.3 (L) 1.5 - 2.5 mg/dL   PHOSPHORUS   Result Value Ref Range    Phosphorus 4.4 2.5 - 4.5 mg/dL   TSH   Result Value Ref Range    TSH 0.530 0.380 - 5.330 uIU/mL   TROPONIN   Result Value Ref Range    Troponin I <0.01 0.00 - 0.04 ng/mL   AMMONIA   Result Value Ref Range    Ammonia 46 (H) 11 - 45 umol/L   ESTIMATED GFR   Result Value Ref Range    GFR If  51 (A) >60 mL/min/1.73 m 2    GFR If Non  42 (A) >60 mL/min/1.73 m 2   INFLUENZA A/B BY PCR   Result Value Ref Range    Influenza virus A RNA Negative Negative    Influenza virus B, PCR Negative Negative   URINE MICROSCOPIC (W/UA)   Result Value Ref Range    WBC 0-2 /hpf    RBC 0-2 /hpf    Bacteria Negative None /hpf    Epithelial Cells Few /hpf    Hyaline Cast 3-5 (A) /lpf   URINE DRUG SCREEN   Result Value Ref Range    Amphetamines Urine Negative Negative    Barbiturates Negative Negative    Benzodiazepines Negative Negative    Cocaine Metabolite Negative Negative    Methadone Negative Negative    Opiates Negative Negative    Oxycodone Negative Negative    Phencyclidine -Pcp Negative Negative    Propoxyphene Negative Negative    Cannabinoid Metab Negative Negative   EKG (ER)   Result Value Ref Range    Report       [facility] Emergency Dept.    Test Date:  2018-02-01  Pt Name:    CANDICE TURNER                 Department: ER  MRN:        [id number]                      Room:       [facility]  Gender:     Female                       Technician: [id number]  :        1960                   Requested By:ER TRIAGE PROTOCOL  Order #:    [id number]                    Reading MD: DAKSHA OLIVER DO    Measurements  Intervals                                Axis  Rate:       84                           P:          75  TX:         168                          QRS:        41  QRSD:       76                           T:          47  QT:         396  QTc:        469    Interpretive Statements  SINUS RHYTHM  No previous ECG available for comparison    Electronically Signed On 2018 16:37:33 PST by DAKSHA OLIVER DO       All labs reviewed by me.    RADIOLOGY  CT-HEAD W/O   Final Result      No acute intracranial findings.         DX-CHEST-PORTABLE (1 VIEW)   Final Result      No evidence of acute cardiopulmonary process.        The radiologist's interpretation of all radiological studies have been reviewed by me.    COURSE & MEDICAL DECISION MAKING  Pertinent Labs & Imaging studies reviewed. (See chart for details)    Review of past medical records shows the patient 2018 patient was seen for DKA, CT head was normal, patient was discharged home. Patient has history of confusion.     3:57 PM - Patient seen and examined at bedside. Patient will be treated with NS infusion 1,000 mL for possible sepsis. Ordered EKG, DX-Chest, Lactic Acid, Influenza, Troponin, Magnesium, Phosphorous, TSH, Lactic Acid, CBC with differential, CMP, Urinalysis, Urine Culture, Blood Culture, Estimated GFR, Ammonia, EKG to evaluate her symptoms.     7:05 PM - I reviewd patient's labs and imaging results. Ordered CT-Head w/o, rine drug screen, urine microscopic. Patient will be treated with repeat NS infusion 1,000 mL    I discussed the patient's case and the above findings with Dr. Oliveira (hospitalist) who agrees to admit patient.      10:34 PM Recheck: Patient is still hypotensive. Patient was treated with repeat NS infusion 1,000 mL.     This is a charming 57 y.o. female that presents with hypotension, altered mental status. CT scan is negative for acute endocrine abnormality, she does not have a significant leukocytosis, she has no evidence of fever and I suspect meningitis. The patient has been here before for metabolic encephalopathy. Currently, the patient is receiving IV fluids with adequate resuscitation on reevaluation she had a blood pressure 100 systolic and is ambulating in the department. She no focal local neurological deficits such as global confusion. The patient at this point will be admitted to the hospitalist for further evaluation and management, IV fluids. The patient does not have evidence of CVA, TIA, meningitis, severe toxicity screen to see slightly hypotensive and responding to IV fluids.     CRITICAL CARE  The very real possibilty of a deterioration of this patient's condition required the highest level of my preparedness for sudden, emergent intervention.  I provided critical care services, which included medication orders, frequent reevaluations of the patient's condition and response to treatment, ordering and reviewing test results, and discussing the case with various consultants.  The critical care time associated with the care of the patient was 35 minutes. Review chart for interventions. This time is exclusive of any other billable procedures.     DISPOSITION:  Patient will be admitted to Dr Oliveira  in critical condition.    FINAL IMPRESSION  Altered mental status  Hypotension  Critical Care Time      Tracie RIVAS (Limaibe), am scribing for, and in the presence of, Darrick Peng D.O    Electronically signed by: Tracie Manning (Diamond), 2/1/2018    Darrick RIVAS D.O. personally performed the services described in this documentation, as scribed by Tracie Manning in my presence,  and it is both accurate and complete.    The note accurately reflects work and decisions made by me.  Darrick Peng  2/1/2018  11:49 PM

## 2018-02-02 ENCOUNTER — APPOINTMENT (OUTPATIENT)
Dept: RADIOLOGY | Facility: MEDICAL CENTER | Age: 58
DRG: 314 | End: 2018-02-02
Attending: INTERNAL MEDICINE
Payer: MEDICARE

## 2018-02-02 PROBLEM — N18.9 ACUTE ON CHRONIC KIDNEY FAILURE (HCC): Status: ACTIVE | Noted: 2018-02-02

## 2018-02-02 PROBLEM — N17.9 ACUTE ON CHRONIC KIDNEY FAILURE (HCC): Status: ACTIVE | Noted: 2018-02-02

## 2018-02-02 PROBLEM — G93.41 ACUTE METABOLIC ENCEPHALOPATHY: Status: ACTIVE | Noted: 2018-02-01

## 2018-02-02 LAB
ALBUMIN SERPL BCP-MCNC: 2.6 G/DL (ref 3.2–4.9)
ALBUMIN/GLOB SERPL: 1.1 G/DL
ALP SERPL-CCNC: 133 U/L (ref 30–99)
ALT SERPL-CCNC: 30 U/L (ref 2–50)
ANION GAP SERPL CALC-SCNC: 5 MMOL/L (ref 0–11.9)
AST SERPL-CCNC: 40 U/L (ref 12–45)
BASOPHILS # BLD AUTO: 0.5 % (ref 0–1.8)
BASOPHILS # BLD: 0.04 K/UL (ref 0–0.12)
BILIRUB SERPL-MCNC: 0.3 MG/DL (ref 0.1–1.5)
BUN SERPL-MCNC: 12 MG/DL (ref 8–22)
CALCIUM SERPL-MCNC: 7.5 MG/DL (ref 8.5–10.5)
CHLORIDE SERPL-SCNC: 109 MMOL/L (ref 96–112)
CO2 SERPL-SCNC: 22 MMOL/L (ref 20–33)
CORTIS SERPL-MCNC: 3.9 UG/DL (ref 0–23)
CREAT SERPL-MCNC: 0.97 MG/DL (ref 0.5–1.4)
EOSINOPHIL # BLD AUTO: 0.13 K/UL (ref 0–0.51)
EOSINOPHIL NFR BLD: 1.5 % (ref 0–6.9)
ERYTHROCYTE [DISTWIDTH] IN BLOOD BY AUTOMATED COUNT: 42.5 FL (ref 35.9–50)
GLOBULIN SER CALC-MCNC: 2.4 G/DL (ref 1.9–3.5)
GLUCOSE BLD-MCNC: 103 MG/DL (ref 65–99)
GLUCOSE BLD-MCNC: 121 MG/DL (ref 65–99)
GLUCOSE BLD-MCNC: 186 MG/DL (ref 65–99)
GLUCOSE BLD-MCNC: 213 MG/DL (ref 65–99)
GLUCOSE BLD-MCNC: 220 MG/DL (ref 65–99)
GLUCOSE BLD-MCNC: 55 MG/DL (ref 65–99)
GLUCOSE BLD-MCNC: 56 MG/DL (ref 65–99)
GLUCOSE SERPL-MCNC: 232 MG/DL (ref 65–99)
HCT VFR BLD AUTO: 34.7 % (ref 37–47)
HGB BLD-MCNC: 10.8 G/DL (ref 12–16)
IMM GRANULOCYTES # BLD AUTO: 0.07 K/UL (ref 0–0.11)
IMM GRANULOCYTES NFR BLD AUTO: 0.8 % (ref 0–0.9)
LYMPHOCYTES # BLD AUTO: 2.59 K/UL (ref 1–4.8)
LYMPHOCYTES NFR BLD: 29.2 % (ref 22–41)
MCH RBC QN AUTO: 25.4 PG (ref 27–33)
MCHC RBC AUTO-ENTMCNC: 31.1 G/DL (ref 33.6–35)
MCV RBC AUTO: 81.5 FL (ref 81.4–97.8)
MONOCYTES # BLD AUTO: 0.71 K/UL (ref 0–0.85)
MONOCYTES NFR BLD AUTO: 8 % (ref 0–13.4)
NEUTROPHILS # BLD AUTO: 5.32 K/UL (ref 2–7.15)
NEUTROPHILS NFR BLD: 60 % (ref 44–72)
NRBC # BLD AUTO: 0 K/UL
NRBC BLD-RTO: 0 /100 WBC
PLATELET # BLD AUTO: 221 K/UL (ref 164–446)
PMV BLD AUTO: 10.1 FL (ref 9–12.9)
POTASSIUM SERPL-SCNC: 3.9 MMOL/L (ref 3.6–5.5)
PROT SERPL-MCNC: 5 G/DL (ref 6–8.2)
RBC # BLD AUTO: 4.26 M/UL (ref 4.2–5.4)
SODIUM SERPL-SCNC: 136 MMOL/L (ref 135–145)
WBC # BLD AUTO: 8.9 K/UL (ref 4.8–10.8)

## 2018-02-02 PROCEDURE — 99233 SBSQ HOSP IP/OBS HIGH 50: CPT | Performed by: INTERNAL MEDICINE

## 2018-02-02 PROCEDURE — 700105 HCHG RX REV CODE 258: Performed by: INTERNAL MEDICINE

## 2018-02-02 PROCEDURE — 82962 GLUCOSE BLOOD TEST: CPT | Mod: 91

## 2018-02-02 PROCEDURE — 80053 COMPREHEN METABOLIC PANEL: CPT

## 2018-02-02 PROCEDURE — 94760 N-INVAS EAR/PLS OXIMETRY 1: CPT

## 2018-02-02 PROCEDURE — 700102 HCHG RX REV CODE 250 W/ 637 OVERRIDE(OP): Performed by: INTERNAL MEDICINE

## 2018-02-02 PROCEDURE — 700117 HCHG RX CONTRAST REV CODE 255: Performed by: INTERNAL MEDICINE

## 2018-02-02 PROCEDURE — 770020 HCHG ROOM/CARE - TELE (206)

## 2018-02-02 PROCEDURE — 70460 CT HEAD/BRAIN W/DYE: CPT

## 2018-02-02 PROCEDURE — A9270 NON-COVERED ITEM OR SERVICE: HCPCS | Performed by: INTERNAL MEDICINE

## 2018-02-02 PROCEDURE — 700111 HCHG RX REV CODE 636 W/ 250 OVERRIDE (IP): Performed by: INTERNAL MEDICINE

## 2018-02-02 PROCEDURE — 85025 COMPLETE CBC W/AUTO DIFF WBC: CPT

## 2018-02-02 PROCEDURE — 82533 TOTAL CORTISOL: CPT

## 2018-02-02 PROCEDURE — 36415 COLL VENOUS BLD VENIPUNCTURE: CPT

## 2018-02-02 RX ORDER — ALBUTEROL SULFATE 90 UG/1
2 AEROSOL, METERED RESPIRATORY (INHALATION) EVERY 4 HOURS PRN
Status: DISCONTINUED | OUTPATIENT
Start: 2018-02-02 | End: 2018-02-03 | Stop reason: HOSPADM

## 2018-02-02 RX ORDER — MAGNESIUM SULFATE HEPTAHYDRATE 40 MG/ML
2 INJECTION, SOLUTION INTRAVENOUS ONCE
Status: COMPLETED | OUTPATIENT
Start: 2018-02-02 | End: 2018-02-02

## 2018-02-02 RX ADMIN — SODIUM CHLORIDE: 9 INJECTION, SOLUTION INTRAVENOUS at 05:20

## 2018-02-02 RX ADMIN — IOHEXOL 80 ML: 350 INJECTION, SOLUTION INTRAVENOUS at 19:50

## 2018-02-02 RX ADMIN — SODIUM CHLORIDE: 9 INJECTION, SOLUTION INTRAVENOUS at 15:50

## 2018-02-02 RX ADMIN — INSULIN HUMAN 2 UNITS: 100 INJECTION, SOLUTION PARENTERAL at 20:18

## 2018-02-02 RX ADMIN — Medication 16 G: at 01:24

## 2018-02-02 RX ADMIN — ENOXAPARIN SODIUM 40 MG: 100 INJECTION SUBCUTANEOUS at 08:49

## 2018-02-02 RX ADMIN — MAGNESIUM SULFATE IN WATER 2 G: 40 INJECTION, SOLUTION INTRAVENOUS at 15:50

## 2018-02-02 RX ADMIN — SODIUM CHLORIDE: 9 INJECTION, SOLUTION INTRAVENOUS at 01:00

## 2018-02-02 RX ADMIN — INSULIN HUMAN 1 UNITS: 100 INJECTION, SOLUTION PARENTERAL at 11:23

## 2018-02-02 ASSESSMENT — COGNITIVE AND FUNCTIONAL STATUS - GENERAL
CLIMB 3 TO 5 STEPS WITH RAILING: A LITTLE
WALKING IN HOSPITAL ROOM: A LITTLE
SUGGESTED CMS G CODE MODIFIER DAILY ACTIVITY: CI
STANDING UP FROM CHAIR USING ARMS: A LITTLE
TOILETING: A LITTLE
MOBILITY SCORE: 21
SUGGESTED CMS G CODE MODIFIER MOBILITY: CJ
DAILY ACTIVITIY SCORE: 23

## 2018-02-02 ASSESSMENT — PAIN SCALES - GENERAL
PAINLEVEL_OUTOF10: 0

## 2018-02-02 ASSESSMENT — COPD QUESTIONNAIRES
DO YOU EVER COUGH UP ANY MUCUS OR PHLEGM?: NO/ONLY WITH OCCASIONAL COLDS OR INFECTIONS
DURING THE PAST 4 WEEKS HOW MUCH DID YOU FEEL SHORT OF BREATH: NONE/LITTLE OF THE TIME
COPD SCREENING SCORE: 3
HAVE YOU SMOKED AT LEAST 100 CIGARETTES IN YOUR ENTIRE LIFE: YES

## 2018-02-02 ASSESSMENT — PATIENT HEALTH QUESTIONNAIRE - PHQ9
1. LITTLE INTEREST OR PLEASURE IN DOING THINGS: NOT AT ALL
SUM OF ALL RESPONSES TO PHQ QUESTIONS 1-9: 0
SUM OF ALL RESPONSES TO PHQ9 QUESTIONS 1 AND 2: 0
2. FEELING DOWN, DEPRESSED, IRRITABLE, OR HOPELESS: NOT AT ALL

## 2018-02-02 ASSESSMENT — ENCOUNTER SYMPTOMS
HEADACHES: 0
RESPIRATORY NEGATIVE: 1
CARDIOVASCULAR NEGATIVE: 1
EYES NEGATIVE: 1
TINGLING: 0
SENSORY CHANGE: 0
GASTROINTESTINAL NEGATIVE: 1
SPEECH CHANGE: 0
DIZZINESS: 0
MUSCULOSKELETAL NEGATIVE: 1
CONSTITUTIONAL NEGATIVE: 1

## 2018-02-02 ASSESSMENT — LIFESTYLE VARIABLES
EVER_SMOKED: YES
DO YOU DRINK ALCOHOL: NO
EVER_SMOKED: YES

## 2018-02-02 NOTE — ASSESSMENT & PLAN NOTE
Recent ultrasound of bilateral lower extremity 2 weeks ago was negative for DVT  xarelto was stopped, in particular due to non compliance

## 2018-02-02 NOTE — PROGRESS NOTES
Pt transported to room T823/01 from Emergency Department by Telemetry RN. Pt oriented to room, unit routine, and call light, educated to call for assistance. Strip alarm on as pt has history of frequent falls, ambulated to bathroom with x1 assist, unsteady. Pt intermittently falling asleep while answering admission questions, able to answer orientation questions (no time) but forgetful and talking to self at times, clouded judgement.

## 2018-02-02 NOTE — ED NOTES
Dr Peng canceled his order of NS bolus when I told him that admitting dictor ordered another bag for a total of 4L of fluids. So MAR is updated to that but is mixed up.  Pt hsa received a total of 3L in ER

## 2018-02-02 NOTE — H&P
Hospital Medicine History and Physical    Date of Service  2018    Chief Complaint  Chief Complaint   Patient presents with   • Syncope     Possible multiple episodes   • Fall     Possible       History of Presenting Illness  57 y.o. female who presented 2018 with possible multiple syncopal episodes.   Apparently, her  called ambulance was patient was unstable on her feet , falling multiple times. According the patient is been going on the last 1-2 weeks. Is disoriented in time and place , therefore ROS limited . She currently denies any chest pain, shortness of breath, palpitation, nausea, vomiting, diarrhea. she does admit poor oral intake. Her mouth is dry. She does admit she feels somewhat confused; she does feel dizzy when trying stand up.   Baseline cognition is unknown   Found to be hypotensive; hypotension improved with IV fluids   Review of chart showed patient was hospitalized for DKA about 10 days ago , she was also encephalopathic   Primary Care Physician  Tiago Higgins M.D.    Consultants  None    Code Status  Full code    Review of Systems  ROS  Please see HPI, all other systems were reviewed and are negative (AMA/CMS criteria)       Past Medical History  Past Medical History:   Diagnosis Date   • DM (diabetes mellitus) (CMS-HCC)    • HTN (hypertension)        Surgical History  Past Surgical History:   Procedure Laterality Date   • APPENDECTOMY     • CHOLECYSTECTOMY     • HCHG  DELIVERY SER     • HYSTERECTOMY, TOTAL ABDOMINAL     • LAMINOTOMY     • OTHER      back, neck, shoulder surgeries   • OTHER ORTHOPEDIC SURGERY         Medications  No current facility-administered medications on file prior to encounter.      Current Outpatient Prescriptions on File Prior to Encounter   Medication Sig Dispense Refill   • metoprolol SR (TOPROL XL) 100 MG TABLET SR 24 HR Take 100 mg by mouth every day.     • rivaroxaban (XARELTO) 20 MG Tab tablet Take 20 mg by mouth with dinner.     •  "cephALEXin (KEFLEX) 500 MG Cap Take 500 mg by mouth 3 times a day. Start date: 17 10 days     • zolpidem (AMBIEN) 5 MG Tab Take 5 mg by mouth every bedtime.     • metformin (GLUCOPHAGE) 500 MG Tab Take 500 mg by mouth 2 times a day, with meals.     • duloxetine (CYMBALTA) 30 MG Cap DR Particles Take 30 mg by mouth 2 times a day.     • lisinopril (PRINIVIL) 10 MG Tab Take 10 mg by mouth every day.     • clonazepam (KLONOPIN) 1 MG TABS Take 1 mg by mouth 3 times a day.     • hydrOXYzine (ATARAX) 50 MG TABS Take 50 mg by mouth every 6 hours as needed for Anxiety.         Family History  Family History   Problem Relation Age of Onset   • Diabetes Mother    • Heart Disease Father    • Cancer Father    • Diabetes Sister      Patient denies family history of heart disease, diabetes or cancer.  Social History  Social History   Substance Use Topics   • Smoking status: Current Every Day Smoker     Packs/day: 1.00   • Smokeless tobacco: Never Used   • Alcohol use No       Allergies  Allergies   Allergen Reactions   • Sulfa Drugs Nausea     N/V nervousness        Physical Exam  Laboratory   Hemodynamics  Temp (24hrs), Av.4 °C (97.5 °F), Min:36.3 °C (97.3 °F), Max:36.4 °C (97.6 °F)   Temperature: 36.4 °C (97.6 °F)  Pulse  Av.8  Min: 69  Max: 86 Heart Rate (Monitored): 71  Blood Pressure: (!) 93/50 (rn notified), NIBP: (!) 92/59      Respiratory      Respiration: 16, Pulse Oximetry: 94 %     Work Of Breathing / Effort: Mild  RUL Breath Sounds: Clear, RML Breath Sounds: Diminished, RLL Breath Sounds: Diminished, EDWIN Breath Sounds: Clear, LLL Breath Sounds: Diminished    Physical Exam  Vitals/ General Appearance:   Weight/BMI: Body mass index is 32.02 kg/m².  Blood pressure (!) 93/50, pulse 81, temperature 36.4 °C (97.6 °F), resp. rate 16, height 1.575 m (5' 2\"), weight 79.4 kg (175 lb 0.7 oz), SpO2 94 %, not currently breastfeeding. Vitals:    18 0000 18 0015 18 0043 18 0357   BP:   (!) 91/52 " "(!) 93/50   Pulse:  72 80 81   Resp: 16 (!) 8 16 16   Temp:   36.3 °C (97.3 °F) 36.4 °C (97.6 °F)   SpO2:  100% 95% 94%   Weight:   79.4 kg (175 lb 0.7 oz)    Height:   1.575 m (5' 2\")     Oxygen Therapy:  Pulse Oximetry: 94 %, O2 (LPM): 0, O2 Delivery: None (Room Air)    Constitutional:  well developed, well nourished, non-toxic, no acute distress  HENMT: Normocephalic, atraumatic, b/l ears normal, nose normal  Eyes:  EOMI, conjunctiva normal, no discharge  Neck: no tracheal deviation, supple  Cardiovascular: normal heart rate,  Rhythm regular, no murmurs, no rubs or gallops; no cyanosis, clubbing or edema  Lungs: Respiratory effort is normal, normal breath sounds, breath sounds clear to auscultation b/l, no rales, rhonchi or wheezing  Abdomen: soft, non-tender, no guarding or rebound  Skin: warm, dry, no erythema, no rash  Neurologic: Alert and oriented, no focal deficits, CN II-XII normal  Psychiatric: No anxiety or depression    Recent Labs      02/01/18   1623  02/02/18   0513   WBC  12.8*  8.9   RBC  4.46  4.26   HEMOGLOBIN  11.3*  10.8*   HEMATOCRIT  36.3*  34.7*   MCV  81.4  81.5   MCH  25.3*  25.4*   MCHC  31.1*  31.1*   RDW  41.9  42.5   PLATELETCT  232  221   MPV  10.3  10.1     Recent Labs      02/01/18   1623  02/02/18   0513   SODIUM  135  136   POTASSIUM  3.6  3.9   CHLORIDE  107  109   CO2  22  22   GLUCOSE  105*  232*   BUN  16  12   CREATININE  1.30  0.97   CALCIUM  8.1*  7.5*     Recent Labs      02/01/18   1623  02/02/18   0513   ALTSGPT  25  30   ASTSGOT  33  40   ALKPHOSPHAT  117*  133*   TBILIRUBIN  0.4  0.3   GLUCOSE  105*  232*                 Lab Results   Component Value Date    TROPONINI <0.01 02/01/2018     Urinalysis:    Lab Results  Component Value Date/Time   SPECGRAVITY 1.011 02/01/2018 1943   GLUCOSEUR 100 (A) 02/01/2018 1943   KETONES Negative 02/01/2018 1943   NITRITE Negative 02/01/2018 1943   WBCURINE 0-2 02/01/2018 1943   RBCURINE 0-2 02/01/2018 1943   BACTERIA Negative " 02/01/2018 1943   EPITHELCELL Few 02/01/2018 1943        Imaging  CT-HEAD W/O   Final Result      No acute intracranial findings.         DX-CHEST-PORTABLE (1 VIEW)   Final Result      No evidence of acute cardiopulmonary process.         Assessment/Plan     I anticipate this patient will require at least two midnights for appropriate medical management, necessitating inpatient admission.    Acute on chronic kidney failure (CMS-HCC)   Overview    This likely secondary to prerenal  Improved with fluids     Hypotension   Assessment & Plan    Patient has signs of hypovolemia, dry mouth, orthostatic hypotension  Secondary to poor oral intake and blood pressure medications  Blood pressure medication, continue IV fluids  Fall Precautions    Cortisol levels at 3.9 at 5 am,   which is low; consider cosyntropin stimulation test        Altered mental state   Assessment & Plan    I suspect could be related to polypharmacy and underlying dementia  Scan of the brain negative for acute findings  Hold sedative medications  cont IV fluid, reassess        Smoking- (present on admission)   Assessment & Plan    I spent 12 minutes, discussing tobacco dependence and cardiac as well as pulmonary risk. Nicotine replacement and smoking cessation instructions. Code 52749          Diabetes (CMS-HCC)- (present on admission)   Assessment & Plan    Hold metformin.  Insulin sliding scale, hypoglycemia protocol  A1c  15.6  Has history of hospitalization for DKA 10 days agoago        History of DVT (deep vein thrombosis)- (present on admission)   Assessment & Plan    Recent ultrasound of bilateral lower extremity 2 weeks ago was negative for DVT  xarelto was stopped, in particular due to non compliance            Prophylaxis: lovenox       I spent 80 minutes evaluating Vin Maciel, reviewing the chart, vitals, labs and imaging, discussing the case with ED physician, medication reconciliation, placing orders and enacting the plan  above.    Sections of this note have been dictated using Dragon Speech recognition software. While care and attention has been placed to ensure the accuracy of this note, there can be occasional typographical errors that may be missed and I apologize if this situation occurs. Please take these errors into context. If questions occur please do not hesitate to call or notify the author of this note for clarification.      LAZ Higgins M.D.

## 2018-02-02 NOTE — DIETARY
Nutrition Services:  Poor PO; Pressure Ulcer noted    Poor PO, Pressure Ulcer noted on Nutrition Admit Screen. Pt is currently on a Regular diet and per chart pt PO %. Ht: 157.5 cm, Wt: 79.4 kg, BMI 32.02. Attempted to speak with pt at bedside, but pt was busy with another discipline.  Per chart review, pt with open pressure ulcer on coccyx.  Consult pending to wound team.  Nutrition recommendations to follow upon wound staging, if appropriate.    RD following.

## 2018-02-02 NOTE — ASSESSMENT & PLAN NOTE
- with hypovolemia, dry oral mucosa noted and reports of poor oral intake and on outpt BP meds  - holding BP meds  - cont IVFs but will decrease rate and monitor BPs  - low cortisol noted; will clinically monitor and pursue further testing if no improvement with current mgmt

## 2018-02-02 NOTE — ASSESSMENT & PLAN NOTE
- cont SSI and monitor accucheks  - holding metformin  - medication compliance concerning; DM education and mgmt provided

## 2018-02-02 NOTE — CARE PLAN
Problem: Nutritional:  Goal: Achieve adequate nutritional intake  Patient will consume 50% of meals  Outcome: PROGRESSING AS EXPECTED  PO % for one meal.

## 2018-02-02 NOTE — ED NOTES
IV on LEFT bicep infiltrated after 600cc of fluid was in, switched to using RIGHT AC and removed IV

## 2018-02-02 NOTE — PROGRESS NOTES
"Hospital Medicine Interval Note  Date of Service:  2/2/2018    Chief Complaint  57 y.o.-year-old female admitted 2/1/2018 with recurrent syncope due to hypotension and polypharmacy.    Interval Problem Update  Pt resting comfortably.  Denies any lightheadedness or dizziness.      Consultants/Specialty  NONE    Disposition  TBD     Review of Systems   Constitutional: Negative.    HENT: Negative.    Eyes: Negative.    Respiratory: Negative.    Cardiovascular: Negative.    Gastrointestinal: Negative.    Musculoskeletal: Negative.    Skin: Negative.    Neurological: Negative for dizziness, tingling, sensory change, speech change and headaches.      Physical Exam Laboratory/Imaging   Vitals:    02/02/18 0015 02/02/18 0043 02/02/18 0357 02/02/18 0700   BP:  (!) 91/52 (!) 93/50 (!) 99/53   Pulse: 72 80 81 78   Resp: (!) 8 16 16 16   Temp:  36.3 °C (97.3 °F) 36.4 °C (97.6 °F) 36.7 °C (98 °F)   SpO2: 100% 95% 94% 95%   Weight:  79.4 kg (175 lb 0.7 oz)     Height:  1.575 m (5' 2\")       Physical Exam   Constitutional: She is oriented to person, place, and time. She appears well-developed and well-nourished. No distress.   HENT:   Head: Normocephalic and atraumatic.   Mouth/Throat: No oropharyngeal exudate.   Eyes: Conjunctivae and EOM are normal. Pupils are equal, round, and reactive to light. No scleral icterus.   Neck: Normal range of motion. Neck supple.   Cardiovascular: Exam reveals no gallop and no friction rub.    No murmur heard.  Pulmonary/Chest: Effort normal and breath sounds normal. No respiratory distress.   Abdominal: Soft. Bowel sounds are normal.   Musculoskeletal: Normal range of motion. She exhibits no edema, tenderness or deformity.   Neurological: She is alert and oriented to person, place, and time. She has normal reflexes. She displays normal reflexes. No cranial nerve deficit. Coordination normal.   Skin: Skin is warm.   Psychiatric: She has a normal mood and affect.   Nursing note and vitals " reviewed.   Lab Results   Component Value Date/Time    WBC 8.9 02/02/2018 05:13 AM    HEMOGLOBIN 10.8 (L) 02/02/2018 05:13 AM    HEMATOCRIT 34.7 (L) 02/02/2018 05:13 AM    PLATELETCT 221 02/02/2018 05:13 AM     Lab Results   Component Value Date/Time    SODIUM 136 02/02/2018 05:13 AM    POTASSIUM 3.9 02/02/2018 05:13 AM    CHLORIDE 109 02/02/2018 05:13 AM    CO2 22 02/02/2018 05:13 AM    GLUCOSE 232 (H) 02/02/2018 05:13 AM    BUN 12 02/02/2018 05:13 AM    CREATININE 0.97 02/02/2018 05:13 AM      Assessment/Plan    * Acute metabolic encephalopathy- (present on admission)   Assessment & Plan    - possibly due to combo of polypharmacy and hypotension  - CT head w/o unremarkable; repeat with contrast pending  - see mgmt for hypotension below  - holding all neuro-effective meds in meantime  - cont IVFs        Hypotension- (present on admission)   Assessment & Plan    - with hypovolemia, dry oral mucosa noted and reports of poor oral intake and on outpt BP meds  - holding BP meds  - cont IVFs and monitor BPs  - low cortisol noted; will clinically monitor and pursue further testing if no improvement with current mgmt        Acute on chronic kidney failure (CMS-HCC)- (present on admission)   Assessment & Plan    - improved with IVFs; monitoring        Smoking- (present on admission)   Assessment & Plan    Colleague spent 12 minutes, discussing tobacco dependence and cardiac as well as pulmonary risk. Nicotine replacement and smoking cessation instructions. Code 33045          Diabetes (CMS-[facility])- (present on admission)   Assessment & Plan    - cont SSI and monitor accucheks  - holding metformin  - medication compliance concerning; DM education and mgmt provided        History of DVT (deep vein thrombosis)- (present on admission)   Assessment & Plan    Recent ultrasound of bilateral lower extremity 2 weeks ago was negative for DVT  xarelto was stopped, in particular due to non compliance           Quality-Core Measures

## 2018-02-02 NOTE — PROGRESS NOTES
Bedside report received. POC discussed with pt; no evidence of learning, pt forgetful. Pt not calling for assistance to the bathroom; educated pt on need to do so. Strip alarm armed in bed and bed locked in lowest position, call light within reach. Will continue to round for safety.

## 2018-02-02 NOTE — ASSESSMENT & PLAN NOTE
Colleague spent 12 minutes, discussing tobacco dependence and cardiac as well as pulmonary risk. Nicotine replacement and smoking cessation instructions. Code 91708

## 2018-02-02 NOTE — ASSESSMENT & PLAN NOTE
- possibly due to combo of polypharmacy and hypotension  - CT head w & w/o unremarkable  - see mgmt for hypotension below  - holding all neuro-effective meds in meantime  - cont IVFs; orthostats pending

## 2018-02-02 NOTE — PROGRESS NOTES
2 RN skin check completed with Charge RN Tanisha. Bilateral ears red, blanching, changed to soft O2 tubing. Heels are reddened, calloused, blanching.    Open pressure ulcer present on coccyx, sacral area reddened, not blanching. Wound consult ordered, unable to take pictures as camera not currently working. Charge RN aware.

## 2018-02-02 NOTE — CARE PLAN
Problem: Safety  Goal: Will remain free from falls  Outcome: PROGRESSING AS EXPECTED  Pt educated regarding fall risk and safety, pt not calling for assistance due to confusion. Bed is locked and in lowest position with strip alarm on, safety maintained throughout shift.     Problem: Venous Thromboembolism (VTW)/Deep Vein Thrombosis (DVT) Prevention:  Goal: Patient will participate in Venous Thrombosis (VTE)/Deep Vein Thrombosis (DVT)Prevention Measures  Outcome: PROGRESSING AS EXPECTED   02/02/18 0402   OTHER   Pharmacologic Prophylaxis Used LMWH: Enoxaparin(Lovenox)

## 2018-02-02 NOTE — ED NOTES
"Med rec complete per pt and per historical  Pt was last admitted on 1/21/18 and med rec was completed per pharmacy  Pt only answering questions with head nods, falling asleep during interview  Unable to verify last doses. When asked if pt had taken any medications since she was discharged on 1/26/18, pt shook head \"no\"  Allergies reviewed   Pt filled a 10 day course of Keflex on 12/28/17, cannot verify if pt finished this treatment  "

## 2018-02-03 ENCOUNTER — PATIENT OUTREACH (OUTPATIENT)
Dept: HEALTH INFORMATION MANAGEMENT | Facility: OTHER | Age: 58
End: 2018-02-03

## 2018-02-03 VITALS
HEART RATE: 100 BPM | SYSTOLIC BLOOD PRESSURE: 156 MMHG | TEMPERATURE: 98.2 F | WEIGHT: 164.02 LBS | HEIGHT: 62 IN | BODY MASS INDEX: 30.18 KG/M2 | RESPIRATION RATE: 18 BRPM | DIASTOLIC BLOOD PRESSURE: 89 MMHG | OXYGEN SATURATION: 94 %

## 2018-02-03 PROBLEM — N18.9 ACUTE ON CHRONIC KIDNEY FAILURE (HCC): Status: RESOLVED | Noted: 2018-02-02 | Resolved: 2018-02-03

## 2018-02-03 PROBLEM — I95.9 HYPOTENSION: Status: RESOLVED | Noted: 2018-02-01 | Resolved: 2018-02-03

## 2018-02-03 PROBLEM — N17.9 ACUTE ON CHRONIC KIDNEY FAILURE (HCC): Status: RESOLVED | Noted: 2018-02-02 | Resolved: 2018-02-03

## 2018-02-03 PROBLEM — G93.41 ACUTE METABOLIC ENCEPHALOPATHY: Status: RESOLVED | Noted: 2018-02-01 | Resolved: 2018-02-03

## 2018-02-03 LAB
ANION GAP SERPL CALC-SCNC: 6 MMOL/L (ref 0–11.9)
BACTERIA UR CULT: NORMAL
BASOPHILS # BLD AUTO: 0.5 % (ref 0–1.8)
BASOPHILS # BLD: 0.04 K/UL (ref 0–0.12)
BUN SERPL-MCNC: 7 MG/DL (ref 8–22)
CALCIUM SERPL-MCNC: 7.4 MG/DL (ref 8.5–10.5)
CHLORIDE SERPL-SCNC: 109 MMOL/L (ref 96–112)
CO2 SERPL-SCNC: 22 MMOL/L (ref 20–33)
CREAT SERPL-MCNC: 0.81 MG/DL (ref 0.5–1.4)
EOSINOPHIL # BLD AUTO: 0.13 K/UL (ref 0–0.51)
EOSINOPHIL NFR BLD: 1.7 % (ref 0–6.9)
ERYTHROCYTE [DISTWIDTH] IN BLOOD BY AUTOMATED COUNT: 41.4 FL (ref 35.9–50)
GLUCOSE BLD-MCNC: 165 MG/DL (ref 65–99)
GLUCOSE BLD-MCNC: 175 MG/DL (ref 65–99)
GLUCOSE SERPL-MCNC: 174 MG/DL (ref 65–99)
HCT VFR BLD AUTO: 33.7 % (ref 37–47)
HGB BLD-MCNC: 10.6 G/DL (ref 12–16)
IMM GRANULOCYTES # BLD AUTO: 0.03 K/UL (ref 0–0.11)
IMM GRANULOCYTES NFR BLD AUTO: 0.4 % (ref 0–0.9)
LYMPHOCYTES # BLD AUTO: 2.47 K/UL (ref 1–4.8)
LYMPHOCYTES NFR BLD: 32.7 % (ref 22–41)
MAGNESIUM SERPL-MCNC: 1.8 MG/DL (ref 1.5–2.5)
MCH RBC QN AUTO: 25 PG (ref 27–33)
MCHC RBC AUTO-ENTMCNC: 31.5 G/DL (ref 33.6–35)
MCV RBC AUTO: 79.5 FL (ref 81.4–97.8)
MONOCYTES # BLD AUTO: 0.48 K/UL (ref 0–0.85)
MONOCYTES NFR BLD AUTO: 6.4 % (ref 0–13.4)
NEUTROPHILS # BLD AUTO: 4.4 K/UL (ref 2–7.15)
NEUTROPHILS NFR BLD: 58.3 % (ref 44–72)
NRBC # BLD AUTO: 0 K/UL
NRBC BLD-RTO: 0 /100 WBC
PLATELET # BLD AUTO: 243 K/UL (ref 164–446)
PMV BLD AUTO: 10.5 FL (ref 9–12.9)
POTASSIUM SERPL-SCNC: 4 MMOL/L (ref 3.6–5.5)
RBC # BLD AUTO: 4.24 M/UL (ref 4.2–5.4)
SIGNIFICANT IND 70042: NORMAL
SITE SITE: NORMAL
SODIUM SERPL-SCNC: 137 MMOL/L (ref 135–145)
SOURCE SOURCE: NORMAL
WBC # BLD AUTO: 7.6 K/UL (ref 4.8–10.8)

## 2018-02-03 PROCEDURE — 85025 COMPLETE CBC W/AUTO DIFF WBC: CPT

## 2018-02-03 PROCEDURE — 700111 HCHG RX REV CODE 636 W/ 250 OVERRIDE (IP): Performed by: INTERNAL MEDICINE

## 2018-02-03 PROCEDURE — 700105 HCHG RX REV CODE 258: Performed by: INTERNAL MEDICINE

## 2018-02-03 PROCEDURE — 82962 GLUCOSE BLOOD TEST: CPT

## 2018-02-03 PROCEDURE — 83735 ASSAY OF MAGNESIUM: CPT

## 2018-02-03 PROCEDURE — 36415 COLL VENOUS BLD VENIPUNCTURE: CPT

## 2018-02-03 PROCEDURE — 80048 BASIC METABOLIC PNL TOTAL CA: CPT

## 2018-02-03 PROCEDURE — 700102 HCHG RX REV CODE 250 W/ 637 OVERRIDE(OP): Performed by: INTERNAL MEDICINE

## 2018-02-03 PROCEDURE — 99239 HOSP IP/OBS DSCHRG MGMT >30: CPT | Performed by: INTERNAL MEDICINE

## 2018-02-03 PROCEDURE — A9270 NON-COVERED ITEM OR SERVICE: HCPCS | Performed by: INTERNAL MEDICINE

## 2018-02-03 RX ORDER — ZOLPIDEM TARTRATE 5 MG/1
5 TABLET ORAL NIGHTLY PRN
Qty: 5 TAB | Refills: 0 | Status: SHIPPED | OUTPATIENT
Start: 2018-02-03 | End: 2018-02-08

## 2018-02-03 RX ADMIN — SODIUM CHLORIDE: 9 INJECTION, SOLUTION INTRAVENOUS at 07:44

## 2018-02-03 RX ADMIN — STANDARDIZED SENNA CONCENTRATE AND DOCUSATE SODIUM 2 TABLET: 8.6; 5 TABLET, FILM COATED ORAL at 07:39

## 2018-02-03 RX ADMIN — SODIUM CHLORIDE: 9 INJECTION, SOLUTION INTRAVENOUS at 03:16

## 2018-02-03 RX ADMIN — ENOXAPARIN SODIUM 40 MG: 100 INJECTION SUBCUTANEOUS at 07:39

## 2018-02-03 RX ADMIN — INSULIN HUMAN 1 UNITS: 100 INJECTION, SOLUTION PARENTERAL at 05:27

## 2018-02-03 RX ADMIN — INSULIN HUMAN 1 UNITS: 100 INJECTION, SOLUTION PARENTERAL at 10:30

## 2018-02-03 ASSESSMENT — PAIN SCALES - GENERAL
PAINLEVEL_OUTOF10: 0
PAINLEVEL_OUTOF10: 0

## 2018-02-03 ASSESSMENT — LIFESTYLE VARIABLES: EVER_SMOKED: YES

## 2018-02-03 NOTE — CARE PLAN
Problem: Venous Thromboembolism (VTW)/Deep Vein Thrombosis (DVT) Prevention:  Goal: Patient will participate in Venous Thrombosis (VTE)/Deep Vein Thrombosis (DVT)Prevention Measures  Outcome: PROGRESSING AS EXPECTED  Patient taking lovenox daily for DVT prevention.

## 2018-02-03 NOTE — DISCHARGE SUMMARY
DISCHARGE SUMMARY     ADMIT DATE:  2/1/2018         DISCHARGE DATE:  2/3/2018    PATIENT ID:  Name: Vin Maciel     YOB: 1960  Age: 57 y.o. female   MRN: [id number]  Address: [address] [place] [id number]  Phone: [phone] (home)    DISCHARGE DIAGNOSIS:  Acute Metabolic Encephalopathy  Hypotension  Polypharmacy  Acute Renal Failure  Diabetes Mellitus Type II  Hx of DVT    CONSULTANTS:  NONE    CONDITION:Stable    DISPOSITION:Home    DIET: ADA    ACTIVITY: As tolerated     HPI/HOSPITAL COURSE:  Please see H&P for details regarding initial hospital presentation.  In summary, 56yo F with PMHx of DVT, DM II, HTN, Insomnia, Depression was admitted for acute metabolic encephalopathy.  Initial evaluation illustrated hypotension, therefore all outpt antihypertensives were held and patient was started on IVFs.  Patient was on many neuro-affective medications which were held as well.  With treatments, BP stabilized and mentation returned to baseline.  DM was controlled with SSI.  Smoking cessation counseling and nicotine replacement was provided as well during hospital course.  At this time, patient is stable for discharge home with recommendations to restart outpt BP meds as necessary with goal BP of 130/70.  Please see recs below regarding additional medication changes.      Physical exam:  Vitals:    02/03/18 0720 02/03/18 1022 02/03/18 1024 02/03/18 1026   BP: 155/82 152/87 158/79 143/79   Pulse: 93 94 100 76   Resp: 16      Temp: 37.3 °C (99.2 °F)      SpO2: 94%      Weight:       Height:         Weight/BMI: Body mass index is 30 kg/m².  Pulse Oximetry: 94 %, O2 (LPM): 0, O2 Delivery: None (Room Air)    Constitutional: She is oriented to person, place, and time. She appears well-developed and well-nourished. No distress.   HENT:   Head: Normocephalic and atraumatic.   Mouth/Throat: No oropharyngeal exudate.   Eyes: Conjunctivae and EOM are normal. Pupils are equal, round, and reactive to light. No  scleral icterus.   Neck: Normal range of motion. Neck supple.   Cardiovascular: Exam reveals no gallop and no friction rub.    No murmur heard.  Pulmonary/Chest: Effort normal and breath sounds normal. No respiratory distress.   Abdominal: Soft. Bowel sounds are normal.   Musculoskeletal: Normal range of motion. She exhibits no edema, tenderness or deformity.   Neurological: She is alert and oriented to person, place, and time. She has normal reflexes. She displays normal reflexes. No cranial nerve deficit. Coordination normal.   Skin: Skin is warm.     PROCEDURES:  NONE    RADIOLOGY:  CT-HEAD WITH   Final Result      Head CT with contrast within normal limits. No evidence of acute cerebral infarction, hemorrhage, mass lesion, or abnormal enhancement.      CT-HEAD W/O   Final Result      No acute intracranial findings.         DX-CHEST-PORTABLE (1 VIEW)   Final Result      No evidence of acute cardiopulmonary process.          DISCHARGE LABS:    Recent Labs      02/01/18 1623 02/02/18 0513 02/03/18   0352   WBC  12.8*  8.9  7.6   RBC  4.46  4.26  4.24   HEMOGLOBIN  11.3*  10.8*  10.6*   HEMATOCRIT  36.3*  34.7*  33.7*   MCV  81.4  81.5  79.5*   MCH  25.3*  25.4*  25.0*   RDW  41.9  42.5  41.4   PLATELETCT  232  221  243   MPV  10.3  10.1  10.5   NEUTSPOLYS  64.00  60.00  58.30   LYMPHOCYTES  24.90  29.20  32.70   MONOCYTES  8.80  8.00  6.40   EOSINOPHILS  1.10  1.50  1.70   BASOPHILS  0.50  0.50  0.50     No results found for: [id number], FOLATE, FERRITIN, IRON, TOTIRONBC    Estimated GFR/CRCL = Estimated Creatinine Clearance: 72.3 mL/min (by C-G formula based on SCr of 0.81 mg/dL).  Recent Labs      02/01/18   1623  02/02/18   0513 02/03/18   0352   SODIUM  135  136  137   POTASSIUM  3.6  3.9  4.0   CHLORIDE  107  109  109   CO2  22  22  22   BUN  16  12  7*   CREATININE  1.30  0.97  0.81   CALCIUM  8.1*  7.5*  7.4*   MAGNESIUM  1.3*   --   1.8   PHOSPHORUS  4.4   --    --    ALBUMIN  2.9*  2.6*   --         Recent Labs      02/01/18   1623  02/02/18   0513   ALTSGPT  25  30   ASTSGOT  33  40   ALKPHOSPHAT  117*  133*   TBILIRUBIN  0.4  0.3   ALBUMIN  2.9*  2.6*   GLOBULIN  2.7  2.4       [facility](GLUCOSE:3,POCGLUCOSE:3)  )  Lab Results   Component Value Date    HBA1C 15.6 (H) 12/04/2017    HBA1C 11.7 (H) 07/01/2013    HBA1C 10.8 10/10/2012    CORTISOL 3.9 02/02/2018       DISCHARGE MEDICATIONS:  (X)  Medication Reconciliation Completed     Medication List      CHANGE how you take these medications      Instructions   zolpidem 5 MG Tabs  What changed:  · when to take this  · reasons to take this  Commonly known as:  AMBIEN   Take 1 Tab by mouth at bedtime as needed for Sleep for up to 5 days.  Dose:  5 mg        CONTINUE taking these medications      Instructions   cephALEXin 500 MG Caps  Commonly known as:  KEFLEX   Take 500 mg by mouth 3 times a day. Start date: 12/28/17 10 days  Dose:  500 mg     DULoxetine 30 MG Cpep  Commonly known as:  CYMBALTA   Take 30 mg by mouth 2 times a day.  Dose:  30 mg     hydrOXYzine HCl 50 MG Tabs  Commonly known as:  ATARAX   Take 50 mg by mouth every 6 hours as needed for Anxiety.  Dose:  50 mg     metFORMIN 500 MG Tabs  Commonly known as:  GLUCOPHAGE   Take 500 mg by mouth 2 times a day, with meals.  Dose:  500 mg     metoprolol  MG Tb24  Commonly known as:  TOPROL XL   Take 100 mg by mouth every day.  Dose:  100 mg     XARELTO 20 MG Tabs tablet  Generic drug:  rivaroxaban   Take 20 mg by mouth with dinner.  Dose:  20 mg        STOP taking these medications    KLONOPIN 1 MG Tabs  Generic drug:  clonazePAM     lisinopril 10 MG Tabs  Commonly known as:  PRINIVIL            INSTRUCTIONS:   The patient was instructed to return to the ER in the event of worsening symptoms. I have counseled the patient on the importance of compliance and the patient has agreed to proceed with all medical recommendations and follow up plan indicated above.   The patient understands that all  medications come with benefits and risks. Risks may include permanent injury or death and these risks can be minimized with close reassessment and monitoring.        Follow up appointment details :     F/U with PCP in one week    PENDING STUDIES:  NONE    Primary Care Provider: Tiago Higgins MD      Time spent on discharge day patient visit, preparing discharge paperwork and arranging for patient follow up 45 mins.

## 2018-02-03 NOTE — DISCHARGE INSTRUCTIONS
Discharge Instructions    Discharged to home by car with relative. Discharged via wheelchair, hospital escort: Yes.  Special equipment needed: Not Applicable    Be sure to schedule a follow-up appointment with your primary care doctor within a week after discharge from hospital for follow up.     Discharge Plan:   Diet Plan: Discussed  Activity Level: Discussed  Smoking Cessation Offered: Patient Refused  Confirmed Symptoms Management: Discussed  Medication Reconciliation Updated: Yes  Influenza Vaccine Indication: Not indicated: Previously immunized this influenza season and > 8 years of age    I understand that a diet low in cholesterol, fat, and sodium is recommended for good health. Unless I have been given specific instructions below for another diet, I accept this instruction as my diet prescription.   Other diet: regular    Special Instructions: None    · Is patient discharged on Warfarin / Coumadin?   No Smoking Hazards  Smoking cigarettes is extremely bad for your health. Tobacco smoke has over 200 known poisons in it. It contains the poisonous gases nitrogen oxide and carbon monoxide. There are over 60 chemicals in tobacco smoke that cause cancer. Some of the chemicals found in cigarette smoke include:   · Cyanide.    · Benzene.    · Formaldehyde.    · Methanol (wood alcohol).    · Acetylene (fuel used in welding torches).    · Ammonia.    Even smoking lightly shortens your life expectancy by several years. You can greatly reduce the risk of medical problems for you and your family by stopping now. Smoking is the most preventable cause of death and disease in our society. Within days of quitting smoking, your circulation improves, you decrease the risk of having a heart attack, and your lung capacity improves. There may be some increased phlegm in the first few days after quitting, and it may take months for your lungs to clear up completely. Quitting for 10 years reduces your risk of developing lung  cancer to almost that of a nonsmoker.   WHAT ARE THE RISKS OF SMOKING?  Cigarette smokers have an increased risk of many serious medical problems, including:  · Lung cancer.    · Lung disease (such as pneumonia, bronchitis, and emphysema).    · Heart attack and chest pain due to the heart not getting enough oxygen (angina).    · Heart disease and peripheral blood vessel disease.    · Hypertension.    · Stroke.    · Oral cancer (cancer of the lip, mouth, or voice box).    · Bladder cancer.    · Pancreatic cancer.    · Cervical cancer.    · Pregnancy complications, including premature birth.    · Stillbirths and smaller  babies, birth defects, and genetic damage to sperm.    · Early menopause.    · Lower estrogen level for women.    · Infertility.    · Facial wrinkles.    · Blindness.    · Increased risk of broken bones (fractures).    · Senile dementia.    · Stomach ulcers and internal bleeding.    · Delayed wound healing and increased risk of complications during surgery.  Because of secondhand smoke exposure, children of smokers have an increased risk of the following:   · Sudden infant death syndrome (SIDS).    · Respiratory infections.    · Lung cancer.    · Heart disease.    · Ear infections.    WHY IS SMOKING ADDICTIVE?  Nicotine is the chemical agent in tobacco that is capable of causing addiction or dependence. When you smoke and inhale, nicotine is absorbed rapidly into the bloodstream through your lungs. Both inhaled and noninhaled nicotine may be addictive.   WHAT ARE THE BENEFITS OF QUITTING?   There are many health benefits to quitting smoking. Some are:   · The likelihood of developing cancer and heart disease decreases. Health improvements are seen almost immediately.    · Blood pressure, pulse rate, and breathing patterns start returning to normal soon after quitting.    · People who quit may see an improvement in their overall quality of life.    HOW DO YOU QUIT SMOKING?  Smoking is an  addiction with both physical and psychological effects, and longtime habits can be hard to change. Your health care provider can recommend:  · Programs and community resources, which may include group support, education, or therapy.  · Replacement products, such as patches, gum, and nasal sprays. Use these products only as directed. Do not replace cigarette smoking with electronic cigarettes (commonly called e-cigarettes). The safety of e-cigarettes is unknown, and some may contain harmful chemicals.  FOR MORE INFORMATION  · American Lung Association: www.lung.org  · American Cancer Society: www.cancer.org     This information is not intended to replace advice given to you by your health care provider. Make sure you discuss any questions you have with your health care provider.     Document Released: 01/25/2006 Document Revised: 10/08/2014 Document Reviewed: 06/09/2014  [employer] Interactive Patient Education ©2016 [employer]    Steps to Quit Smoking   Smoking tobacco can be harmful to your health and can affect almost every organ in your body. Smoking puts you, and those around you, at risk for developing many serious chronic diseases. Quitting smoking is difficult, but it is one of the best things that you can do for your health. It is never too late to quit.  WHAT ARE THE BENEFITS OF QUITTING SMOKING?  When you quit smoking, you lower your risk of developing serious diseases and conditions, such as:  · Lung cancer or lung disease, such as COPD.  · Heart disease.  · Stroke.  · Heart attack.  · Infertility.  · Osteoporosis and bone fractures.  Additionally, symptoms such as coughing, wheezing, and shortness of breath may get better when you quit. You may also find that you get sick less often because your body is stronger at fighting off colds and infections. If you are pregnant, quitting smoking can help to reduce your chances of having a baby of low birth weight.  HOW DO I GET READY TO QUIT?  When you decide to  "quit smoking, create a plan to make sure that you are successful. Before you quit:  · Pick a date to quit. Set a date within the next two weeks to give you time to prepare.  · Write down the reasons why you are quitting. Keep this list in places where you will see it often, such as on your bathroom mirror or in your car or wallet.  · Identify the people, places, things, and activities that make you want to smoke (triggers) and avoid them. Make sure to take these actions:  ¨ Throw away all cigarettes at home, at work, and in your car.  ¨ Throw away smoking accessories, such as ashtrays and lighters.  ¨ Clean your car and make sure to empty the ashtray.  ¨ Clean your home, including curtains and carpets.  · Tell your family, friends, and coworkers that you are quitting. Support from your loved ones can make quitting easier.  · Talk with your health care provider about your options for quitting smoking.  · Find out what treatment options are covered by your health insurance.  WHAT STRATEGIES CAN I USE TO QUIT SMOKING?   Talk with your healthcare provider about different strategies to quit smoking. Some strategies include:  · Quitting smoking altogether instead of gradually lessening how much you smoke over a period of time. Research shows that quitting \"cold turkey\" is more successful than gradually quitting.  · Attending in-person counseling to help you build problem-solving skills. You are more likely to have success in quitting if you attend several counseling sessions. Even short sessions of 10 minutes can be effective.  · Finding resources and support systems that can help you to quit smoking and remain smoke-free after you quit. These resources are most helpful when you use them often. They can include:  ¨ Online chats with a counselor.  ¨ Telephone quitlines.  ¨ Printed self-help materials.  ¨ Support groups or group counseling.  ¨ Text messaging programs.  ¨ Mobile phone applications.  · Taking medicines to " help you quit smoking. (If you are pregnant or breastfeeding, talk with your health care provider first.) Some medicines contain nicotine and some do not. Both types of medicines help with cravings, but the medicines that include nicotine help to relieve withdrawal symptoms. Your health care provider may recommend:  ¨ Nicotine patches, gum, or lozenges.  ¨ Nicotine inhalers or sprays.  ¨ Non-nicotine medicine that is taken by mouth.  Talk with your health care provider about combining strategies, such as taking medicines while you are also receiving in-person counseling. Using these two strategies together makes you more likely to succeed in quitting than if you used either strategy on its own.  If you are pregnant or breastfeeding, talk with your health care provider about finding counseling or other support strategies to quit smoking. Do not take medicine to help you quit smoking unless told to do so by your health care provider.  WHAT THINGS CAN I DO TO MAKE IT EASIER TO QUIT?  Quitting smoking might feel overwhelming at first, but there is a lot that you can do to make it easier. Take these important actions:  · Reach out to your family and friends and ask that they support and encourage you during this time. Call telephone quitlines, reach out to support groups, or work with a counselor for support.  · Ask people who smoke to avoid smoking around you.  · Avoid places that trigger you to smoke, such as bars, parties, or smoke-break areas at work.  · Spend time around people who do not smoke.  · Lessen stress in your life, because stress can be a smoking trigger for some people. To lessen stress, try:  ¨ Exercising regularly.  ¨ Deep-breathing exercises.  ¨ Yoga.  ¨ Meditating.  ¨ Performing a body scan. This involves closing your eyes, scanning your body from head to toe, and noticing which parts of your body are particularly tense. Purposefully relax the muscles in those areas.  · Download or purchase mobile  phone or tablet apps (applications) that can help you stick to your quit plan by providing reminders, tips, and encouragement. There are many free apps, such as QuitGuide from the CDC (Centers for Disease Control and Prevention). You can find other support for quitting smoking (smoking cessation) through smokefree.gov and other websites.  HOW WILL I FEEL WHEN I QUIT SMOKING?  Within the first 24 hours of quitting smoking, you may start to feel some withdrawal symptoms. These symptoms are usually most noticeable 2-3 days after quitting, but they usually do not last beyond 2-3 weeks. Changes or symptoms that you might experience include:  · Mood swings.  · Restlessness, anxiety, or irritation.  · Difficulty concentrating.  · Dizziness.  · Strong cravings for sugary foods in addition to nicotine.  · Mild weight gain.  · Constipation.  · Nausea.  · Coughing or a sore throat.  · Changes in how your medicines work in your body.  · A depressed mood.  · Difficulty sleeping (insomnia).  After the first 2-3 weeks of quitting, you may start to notice more positive results, such as:  · Improved sense of smell and taste.  · Decreased coughing and sore throat.  · Slower heart rate.  · Lower blood pressure.  · Clearer skin.  · The ability to breathe more easily.  · Fewer sick days.  Quitting smoking is very challenging for most people. Do not get discouraged if you are not successful the first time. Some people need to make many attempts to quit before they achieve long-term success. Do your best to stick to your quit plan, and talk with your health care provider if you have any questions or concerns.     This information is not intended to replace advice given to you by your health care provider. Make sure you discuss any questions you have with your health care provider.     Document Released: 12/12/2002 Document Revised: 05/03/2016 Document Reviewed: 03/28/2013  [employer] Interactive Patient Education ©2016 [employer]  Smoking Cessation, Tips for Success  If you are ready to quit smoking, congratulations! You have chosen to help yourself be healthier. Cigarettes bring nicotine, tar, carbon monoxide, and other irritants into your body. Your lungs, heart, and blood vessels will be able to work better without these poisons. There are many different ways to quit smoking. Nicotine gum, nicotine patches, a nicotine inhaler, or nicotine nasal spray can help with physical craving. Hypnosis, support groups, and medicines help break the habit of smoking.  WHAT THINGS CAN I DO TO MAKE QUITTING EASIER?   Here are some tips to help you quit for good:  · Pick a date when you will quit smoking completely. Tell all of your friends and family about your plan to quit on that date.  · Do not try to slowly cut down on the number of cigarettes you are smoking. Pick a quit date and quit smoking completely starting on that day.  · Throw away all cigarettes.    · Clean and remove all ashtrays from your home, work, and car.  · On a card, write down your reasons for quitting. Carry the card with you and read it when you get the urge to smoke.  · Cleanse your body of nicotine. Drink enough water and fluids to keep your urine clear or pale yellow. Do this after quitting to flush the nicotine from your body.  · Learn to predict your moods. Do not let a bad situation be your excuse to have a cigarette. Some situations in your life might tempt you into wanting a cigarette.  · Never have \"just one\" cigarette. It leads to wanting another and another. Remind yourself of your decision to quit.  · Change habits associated with smoking. If you smoked while driving or when feeling stressed, try other activities to replace smoking. Stand up when drinking your coffee. Brush your teeth after eating. Sit in a different chair when you read the paper. Avoid alcohol while trying to quit, and try to drink fewer caffeinated beverages. Alcohol and caffeine may urge you to " "smoke.  · Avoid foods and drinks that can trigger a desire to smoke, such as sugary or spicy foods and alcohol.  · Ask people who smoke not to smoke around you.  · Have something planned to do right after eating or having a cup of coffee. For example, plan to take a walk or exercise.  · Try a relaxation exercise to calm you down and decrease your stress. Remember, you may be tense and nervous for the first 2 weeks after you quit, but this will pass.  · Find new activities to keep your hands busy. Play with a pen, coin, or rubber band. Doodle or draw things on paper.  · Brush your teeth right after eating. This will help cut down on the craving for the taste of tobacco after meals. You can also try mouthwash.    · Use oral substitutes in place of cigarettes. Try using lemon drops, carrots, cinnamon sticks, or chewing gum. Keep them handy so they are available when you have the urge to smoke.  · When you have the urge to smoke, try deep breathing.  · Designate your home as a nonsmoking area.  · If you are a heavy smoker, ask your health care provider about a prescription for nicotine chewing gum. It can ease your withdrawal from nicotine.  · Reward yourself. Set aside the cigarette money you save and buy yourself something nice.  · Look for support from others. Join a support group or smoking cessation program. Ask someone at home or at work to help you with your plan to quit smoking.  · Always ask yourself, \"Do I need this cigarette or is this just a reflex?\" Tell yourself, \"Today, I choose not to smoke,\" or \"I do not want to smoke.\" You are reminding yourself of your decision to quit.  · Do not replace cigarette smoking with electronic cigarettes (commonly called e-cigarettes). The safety of e-cigarettes is unknown, and some may contain harmful chemicals.  · If you relapse, do not give up! Plan ahead and think about what you will do the next time you get the urge to smoke.  HOW WILL I FEEL WHEN I QUIT SMOKING?  You " may have symptoms of withdrawal because your body is used to nicotine (the addictive substance in cigarettes). You may crave cigarettes, be irritable, feel very hungry, cough often, get headaches, or have difficulty concentrating. The withdrawal symptoms are only temporary. They are strongest when you first quit but will go away within 10-14 days. When withdrawal symptoms occur, stay in control. Think about your reasons for quitting. Remind yourself that these are signs that your body is healing and getting used to being without cigarettes. Remember that withdrawal symptoms are easier to treat than the major diseases that smoking can cause.   Even after the withdrawal is over, expect periodic urges to smoke. However, these cravings are generally short lived and will go away whether you smoke or not. Do not smoke!  WHAT RESOURCES ARE AVAILABLE TO HELP ME QUIT SMOKING?  Your health care provider can direct you to community resources or hospitals for support, which may include:  · Group support.  · Education.  · Hypnosis.  · Therapy.     This information is not intended to replace advice given to you by your health care provider. Make sure you discuss any questions you have with your health care provider.     Document Released: 09/15/2005 Document Revised: 01/08/2016 Document Reviewed: 06/05/2014  [employer] Interactive Patient Education ©2016 [employer]    Deep Vein Thrombosis  A deep vein thrombosis (DVT) is a blood clot that develops in the deep, larger veins of the leg, arm, or pelvis. These are more dangerous than clots that might form in veins near the surface of the body. A DVT can lead to serious and even life-threatening complications if the clot breaks off and travels in the bloodstream to the lungs.   A DVT can damage the valves in your leg veins so that instead of flowing upward, the blood pools in the lower leg. This is called post-thrombotic syndrome, and it can result in pain, swelling, discoloration,  and sores on the leg.  CAUSES  Usually, several things contribute to the formation of blood clots. Contributing factors include:  · The flow of blood slows down.  · The inside of the vein is damaged in some way.  · You have a condition that makes blood clot more easily.  RISK FACTORS  Some people are more likely than others to develop blood clots. Risk factors include:   · Smoking.  · Being overweight (obese).  · Sitting or lying still for a long time. This includes long-distance travel, paralysis, or recovery from an illness or surgery.  Other factors that increase risk are:   · Older age, especially over 75 years of age.  · Having a family history of blood clots or if you have already had a blot clot.  · Having major or lengthy surgery. This is especially true for surgery on the hip, knee, or belly (abdomen). Hip surgery is particularly high risk.  · Having a long, thin tube (catheter) placed inside a vein during a medical procedure.  · Breaking a hip or leg.  · Having cancer or cancer treatment.  · Pregnancy and childbirth.  ¨ Hormone changes make the blood clot more easily during pregnancy.  ¨ The fetus puts pressure on the veins of the pelvis.  ¨ There is a risk of injury to veins during delivery or a caesarean delivery. The risk is highest just after childbirth.  · Medicines containing the female hormone estrogen. This includes birth control pills and hormone replacement therapy.  · Other circulation or heart problems.    SIGNS AND SYMPTOMS  When a clot forms, it can either partially or totally block the blood flow in that vein. Symptoms of a DVT can include:  · Swelling of the leg or arm, especially if one side is much worse.  · Warmth and redness of the leg or arm, especially if one side is much worse.  · Pain in an arm or leg. If the clot is in the leg, symptoms may be more noticeable or worse when standing or walking.  The symptoms of a DVT that has traveled to the lungs (pulmonary embolism, PE) usually  start suddenly and include:  · Shortness of breath.  · Coughing.  · Coughing up blood or blood-tinged mucus.  · Chest pain. The chest pain is often worse with deep breaths.  · Rapid heartbeat.  Anyone with these symptoms should get emergency medical treatment right away. Do not wait to see if the symptoms will go away. Call your local emergency services (911 in the [place] if you have these symptoms. Do not drive yourself to the hospital.  DIAGNOSIS  If a DVT is suspected, your health care provider will take a full medical history and perform a physical exam. Tests that also may be required include:  · Blood tests, including studies of the clotting properties of the blood.  · Ultrasound to see if you have clots in your legs or lungs.  · X-rays to show the flow of blood when dye is injected into the veins (venogram).  · Studies of your lungs if you have any chest symptoms.  PREVENTION  · Exercise the legs regularly. Take a brisk 30-minute walk every day.  · Maintain a weight that is appropriate for your height.  · Avoid sitting or lying in bed for long periods of time without moving your legs.  · Women, particularly those over the age of 35 years, should consider the risks and benefits of taking estrogen medicines, including birth control pills.  · Do not smoke, especially if you take estrogen medicines.  · Long-distance travel can increase your risk of DVT. You should exercise your legs by walking or pumping the muscles every hour.  · Many of the risk factors above relate to situations that exist with hospitalization, either for illness, injury, or elective surgery. Prevention may include medical and nonmedical measures.  ¨ Your health care provider will assess you for the need for venous thromboembolism prevention when you are admitted to the hospital. If you are having surgery, your surgeon will assess you the day of or day after surgery.  TREATMENT  Once identified, a DVT can be treated. It can also be prevented in  some circumstances. Once you have had a DVT, you may be at increased risk for a DVT in the future. The most common treatment for DVT is blood-thinning (anticoagulant) medicine, which reduces the blood's tendency to clot. Anticoagulants can stop new blood clots from forming and stop old clots from growing. They cannot dissolve existing clots. Your body does this by itself over time. Anticoagulants can be given by mouth, through an IV tube, or by injection. Your health care provider will determine the best program for you. Other medicines or treatments that may be used are:  · Heparin or related medicines (low molecular weight heparin) are often the first treatment for a blood clot. They act quickly. However, they cannot be taken orally and must be given either in shot form or by IV tube.  ¨ Heparin can cause a fall in a component of blood that stops bleeding and forms blood clots (platelets). You will be monitored with blood tests to be sure this does not occur.  · Warfarin is an anticoagulant that can be swallowed. It takes a few days to start working, so usually heparin or related medicines are used in combination. Once warfarin is working, heparin is usually stopped.  · Factor Xa inhibitor medicines, such as rivaroxaban and apixaban, also reduce blood clotting. These medicines are taken orally and can often be used without heparin or related medicines.  · Less commonly, clot dissolving drugs (thrombolytics) are used to dissolve a DVT. They carry a high risk of bleeding, so they are used mainly in severe cases where your life or a part of your body is threatened.  · Very rarely, a blood clot in the leg needs to be removed surgically.  · If you are unable to take anticoagulants, your health care provider may arrange for you to have a filter placed in a main vein in your abdomen. This filter prevents clots from traveling to your lungs.  HOME CARE INSTRUCTIONS  · Take all medicines as directed by your health care  provider.  · Learn as much as you can about DVT.  · Wear a medical alert bracelet or carry a medical alert card.  · Ask your health care provider how soon you can go back to normal activities. It is important to stay active to prevent blood clots. If you are on anticoagulant medicine, avoid contact sports.  · It is very important to exercise. This is especially important while traveling, sitting, or standing for long periods of time. Exercise your legs by walking or by tightening and relaxing your leg muscles regularly. Take frequent walks.  · You may need to wear compression stockings. These are tight elastic stockings that apply pressure to the lower legs. This pressure can help keep the blood in the legs from clotting.  Taking Warfarin  Warfarin is a daily medicine that is taken by mouth. Your health care provider will advise you on the length of treatment (usually 3-6 months, sometimes lifelong). If you take warfarin:  · Understand how to take warfarin and foods that can affect how warfarin works in your body.  · Too much and too little warfarin are both dangerous. Too much warfarin increases the risk of bleeding. Too little warfarin continues to allow the risk for blood clots.  Warfarin and Regular Blood Testing  While taking warfarin, you will need to have regular blood tests to measure your blood clotting time. These blood tests usually include both the prothrombin time (PT) and international normalized ratio (INR) tests. The PT and INR results allow your health care provider to adjust your dose of warfarin. It is very important that you have your PT and INR tested as often as directed by your health care provider.     Warfarin and Your Diet  Avoid major changes in your diet, or notify your health care provider before changing your diet. Arrange a visit with a registered dietitian to answer your questions. Many foods, especially foods high in vitamin K, can interfere with warfarin and affect the PT and INR  results. You should eat a consistent amount of foods high in vitamin K. Foods high in vitamin K include:   · Spinach, kale, broccoli, cabbage, danna and turnip greens, [place]man sprouts, peas, cauliflower, seaweed, and parsley.  · Beef and pork liver.  · Green tea.  · Soybean oil.  Warfarin with Other Medicines  Many medicines can interfere with warfarin and affect the PT and INR results. You must:  · Tell your health care provider about any and all medicines, vitamins, and supplements you take, including aspirin and other over-the-counter anti-inflammatory medicines. Be especially cautious with aspirin and anti-inflammatory medicines. Ask your health care provider before taking these.  · Do not take or discontinue any prescribed or over-the-counter medicine except on the advice of your health care provider or pharmacist.  Warfarin Side Effects  Warfarin can have side effects, such as easy bruising and difficulty stopping bleeding. Ask your health care provider or pharmacist about other side effects of warfarin. You will need to:  · Hold pressure over cuts for longer than usual.  · Notify your dentist and other health care providers that you are taking warfarin before you undergo any procedures where bleeding may occur.  Warfarin with Alcohol and Tobacco   · Drinking alcohol frequently can increase the effect of warfarin, leading to excess bleeding. It is best to avoid alcoholic drinks or to consume only very small amounts while taking warfarin. Notify your health care provider if you change your alcohol intake.    · Do not use any tobacco products including cigarettes, chewing tobacco, or electronic cigarettes. If you smoke, quit. Ask your health care provider for help with quitting smoking.  Alternative Medicines to Warfarin: Factor Xa Inhibitor Medicines  · These blood-thinning medicines are taken by mouth, usually for several weeks or longer. It is important to take the medicine every single day at the same  time each day.  · There are no regular blood tests required when using these medicines.  · There are fewer food and drug interactions than with warfarin.  · The side effects of this class of medicine are similar to those of warfarin, including excessive bruising or bleeding. Ask your health care provider or pharmacist about other potential side effects.  SEEK MEDICAL CARE IF:  · You notice a rapid heartbeat.  · You feel weaker or more tired than usual.  · You feel faint.  · You notice increased bruising.  · You feel your symptoms are not getting better in the time expected.  · You believe you are having side effects of medicine.  SEEK IMMEDIATE MEDICAL CARE IF:  · You have chest pain.  · You have trouble breathing.  · You have new or increased swelling or pain in one leg.  · You cough up blood.  · You notice blood in vomit, in a bowel movement, or in urine.  MAKE SURE YOU:  · Understand these instructions.  · Will watch your condition.  · Will get help right away if you are not doing well or get worse.     This information is not intended to replace advice given to you by your health care provider. Make sure you discuss any questions you have with your health care provider.     Document Released: 12/18/2006 Document Revised: 01/08/2016 Document Reviewed: 04/13/2016  [employer] Interactive Patient Education ©2016 [employer]  Diabetes and Sick Day Management  Blood sugar (glucose) can be more difficult to control when you are sick. Colds, fever, flu, nausea, vomiting, and diarrhea are all examples of common illnesses that can cause problems for people with diabetes. Loss of body fluids (dehydration) from fever, vomiting, diarrhea, infection, and the stress of a sickness can all cause blood glucose levels to increase. Because of this, it is very important to take your diabetes medicines and to eat some form of carbohydrate food when you are sick. Liquid or soft foods are often tolerated, and they help to replace  fluids.  HOME CARE INSTRUCTIONS  These main guidelines are intended for managing a short-term (24 hours or less) sickness:  · Take your usual dose of insulin or oral diabetes medicine. An exception would be if you take any form of metformin. If you cannot eat or drink, you can become dehydrated and should not take this medicine.  · Continue to take your insulin even if you are unable to eat solid foods or are vomiting. Your insulin dose may stay the same, or it may need to be increased when you are sick.  · You will need to test your blood glucose more often, generally every 2-4 hours. If you have type 1 diabetes, test your urine for ketones every 4 hours. If you have type 2 diabetes, test your urine for ketones as directed by your health care provider.  · Eat some form of food that contains carbohydrates. The carbohydrates can be in solid or liquid form. You should eat 45-50 g of carbohydrates every 3-4 hours.  · Replace fluids if you have a fever, vomit, or have diarrhea. Ask your health care provider for specific rehydration instructions.  · Watch carefully for the signs of ketoacidosis if you have type 1 diabetes. Call your health care provider if any of the following symptoms are present, especially in children:  ¨ Moderate to large ketones in the urine along with a high blood glucose level.  ¨ Severe nausea.  ¨ Vomiting.  ¨ Diarrhea.  ¨ Abdominal pain.  ¨ Rapid breathing.  · Drink extra liquids that do not contain sugar such as water.  · Be careful with over-the-counter medicines. Read the labels. They may contain sugar or types of sugars that can increase your blood glucose level.  Food Choices for Illness  All of the food choices below contain about 15 g of carbohydrates. Plan ahead and keep some of these foods around.   · ½ to ¾ cup carbonated beverage containing sugar. Carbonated beverages will usually be better tolerated if they are opened and left at room temperature for a few minutes.  · ½ of a twin  frozen ice pop.  · ½ cup regular gelatin.  · ½ cup juice.  · ½ cup ice cream or frozen yogurt.  · ½ cup cooked cereal.  · ¼ cup sherbet.  · 1 cup clear broth or soup.  · 1 cup cream soup.  · ½ cup regular custard.  · ½ cup regular pudding.  · 1 cup sports drink.  · 1 cup plain yogurt.  · 1 slice toast.  · 6 squares saltine crackers.  · 5 vanilla wafers.  SEEK MEDICAL CARE IF:   · You are unable to drink fluids, even small amounts.  · You have nausea and vomiting for more than 6 hours.  · You have diarrhea for more than 6 hours.  · Your blood glucose level is more than 240 mg/dL, even with additional insulin.  · There is a change in mental status.  · You develop an additional serious sickness.  · You have been sick for 2 days and are not getting better.  · You have a fever.  SEEK IMMEDIATE MEDICAL CARE IF:  · You have difficulty breathing.  · You have moderate to large ketone levels.  MAKE SURE YOU:  · Understand these instructions.  · Will watch your condition.  · Will get help right away if you are not doing well or get worse.     This information is not intended to replace advice given to you by your health care provider. Make sure you discuss any questions you have with your health care provider.     Document Released: 12/20/2004 Document Revised: 01/08/2016 Document Reviewed: 05/27/2014  [employer] Interactive Patient Education ©2016 [employer]    Diabetes and Exercise  Exercising regularly is important. It is not just about losing weight. It has many health benefits, such as:  · Improving your overall fitness, flexibility, and endurance.  · Increasing your bone density.  · Helping with weight control.  · Decreasing your body fat.  · Increasing your muscle strength.  · Reducing stress and tension.  · Improving your overall health.  People with diabetes who exercise gain additional benefits because exercise:  · Reduces appetite.  · Improves the body's use of blood sugar (glucose).  · Helps lower or control blood  glucose.  · Decreases blood pressure.  · Helps control blood lipids (such as cholesterol and triglycerides).  · Improves the body's use of the hormone insulin by:  ¨ Increasing the body's insulin sensitivity.  ¨ Reducing the body's insulin needs.  · Decreases the risk for heart disease because exercising:  ¨ Lowers cholesterol and triglycerides levels.  ¨ Increases the levels of good cholesterol (such as high-density lipoproteins [HDL]) in the body.  ¨ Lowers blood glucose levels.  YOUR ACTIVITY PLAN   Choose an activity that you enjoy, and set realistic goals. To exercise safely, you should begin practicing any new physical activity slowly, and gradually increase the intensity of the exercise over time. Your health care provider or diabetes educator can help create an activity plan that works for you. General recommendations include:  · Encouraging children to engage in at least 60 minutes of physical activity each day.  · Stretching and performing strength training exercises, such as yoga or weight lifting, at least 2 times per week.  · Performing a total of at least 150 minutes of moderate-intensity exercise each week, such as brisk walking or water aerobics.  · Exercising at least 3 days per week, making sure you allow no more than 2 consecutive days to pass without exercising.  · Avoiding long periods of inactivity (90 minutes or more). When you have to spend an extended period of time sitting down, take frequent breaks to walk or stretch.  RECOMMENDATIONS FOR EXERCISING WITH TYPE 1 OR TYPE 2 DIABETES   · Check your blood glucose before exercising. If blood glucose levels are greater than 240 mg/dL, check for urine ketones. Do not exercise if ketones are present.  · Avoid injecting insulin into areas of the body that are going to be exercised. For example, avoid injecting insulin into:  ¨ The arms when playing tennis.  ¨ The legs when jogging.  · Keep a record of:  ¨ Food intake before and after you  exercise.  ¨ Expected peak times of insulin action.  ¨ Blood glucose levels before and after you exercise.  ¨ The type and amount of exercise you have done.  · Review your records with your health care provider. Your health care provider will help you to develop guidelines for adjusting food intake and insulin amounts before and after exercising.  · If you take insulin or oral hypoglycemic agents, watch for signs and symptoms of hypoglycemia. They include:  ¨ Dizziness.  ¨ Shaking.  ¨ Sweating.  ¨ Chills.  ¨ Confusion.  · Drink plenty of water while you exercise to prevent dehydration or heat stroke. Body water is lost during exercise and must be replaced.  · Talk to your health care provider before starting an exercise program to make sure it is safe for you. Remember, almost any type of activity is better than none.     This information is not intended to replace advice given to you by your health care provider. Make sure you discuss any questions you have with your health care provider.     Document Released: 03/09/2005 Document Revised: 05/03/2016 Document Reviewed: 05/27/2014  [employer] Interactive Patient Education ©2016 [employer]  Zolpidem tablets  What is this medicine?  ZOLPIDEM (zole PI dem) is used to treat insomnia. This medicine helps you to fall asleep and sleep through the night.  This medicine may be used for other purposes; ask your health care provider or pharmacist if you have questions.  COMMON BRAND NAME(S): Kelvin  What should I tell my health care provider before I take this medicine?  They need to know if you have any of these conditions:  -depression  -history of a drug or alcohol abuse problem  -liver disease  -lung or breathing disease  -suicidal thoughts  -an unusual or allergic reaction to zolpidem, other medicines, foods, dyes, or preservatives  -pregnant or trying to get pregnant  -breast-feeding  How should I use this medicine?  Take this medicine by mouth with a glass of water.  Follow the directions on the prescription label. It is better to take this medicine on an empty stomach and only when you are ready for bed. Do not take your medicine more often than directed. If you have been taking this medicine for several weeks and suddenly stop taking it, you may get unpleasant withdrawal symptoms. Your doctor or health care professional may want to gradually reduce the dose. Do not stop taking this medicine on your own. Always follow your doctor or health care professional's advice.  A special MedGuide will be given to you by the pharmacist with each prescription and refill. Be sure to read this information carefully each time.  Talk to your pediatrician regarding the use of this medicine in children. Special care may be needed.  Overdosage: If you think you have taken too much of this medicine contact a poison control center or emergency room at once.  NOTE: This medicine is only for you. Do not share this medicine with others.  What if I miss a dose?  This does not apply. This medicine should only be taken immediately before going to sleep. Do not take double or extra doses.  What may interact with this medicine?  -herbal medicines like kava kava, melatonin, Erwin's wort and valerian  -medicines for fungal infections like ketoconazole, fluconazole, or itraconazole  -medicines for treating depression or other mental problems  -other medicines given for sleep  -some medicines for Parkinson' s disease or other movement disorders  -some medicines used to treat HIV infection or AIDS, like ritonavir  This list may not describe all possible interactions. Give your health care provider a list of all the medicines, herbs, non-prescription drugs, or dietary supplements you use. Also tell them if you smoke, drink alcohol, or use illegal drugs. Some items may interact with your medicine.  What should I watch for while using this medicine?  Visit your doctor or health care professional for regular  checks on your progress. Keep a regular sleep schedule by going to bed at about the same time each night. Avoid caffeine-containing drinks in the evening hours. When sleep medicines are used every night for more than a few weeks, they may stop working. Talk to your doctor if you still have trouble sleeping.  Do not take this medicine unless you are able to get a full night's sleep before you must be active again. You may not be able to remember things that you do in the hours after you take this medicine. Some people have reported driving, making phone calls, or preparing and eating food while asleep after taking sleep medicine. Take this medicine right before going to sleep. Tell your doctor if you are have any problems with your memory.  After you stop taking this medicine, you may have trouble falling asleep. This is called rebound insomnia. This problem usually goes away on its own after 1 or 2 nights.  You may get drowsy or dizzy. Do not drive, use machinery, or do anything that needs mental alertness until you know how this medicine affects you. Do not stand or sit up quickly, especially if you are an older patient. This reduces the risk of dizzy or fainting spells. Alcohol may interfere with the effect of this medicine. Avoid alcoholic drinks. This medicine may cause a decrease in mental alertness the morning after use, even if you feel that you are fully awake. Tell your doctor if you will need to perform activities requiring full alertness, such as driving, the next morning after you have taken this medicine.  If you or your family notice any changes in your behavior, or if you have any unusual or disturbing thoughts, call your doctor right away.  What side effects may I notice from receiving this medicine?  Side effects that you should report to your doctor or health care professional as soon as possible:  -allergic reactions like skin rash, itching or hives, swelling of the face, lips, or tongue  -changes  in vision  -confusion  -depressed mood  -feeling faint or lightheaded, falls  -hallucinations  -problems with balance, speaking, walking  -restlessness, excitability, or feelings of agitation  -unusual activities while asleep like driving, eating, making phone calls  Side effects that usually do not require medical attention (report to your doctor or health care professional if they continue or are bothersome):  -diarrhea  -dizziness, or daytime drowsiness, sometimes called a hangover effect  -headache  This list may not describe all possible side effects. Call your doctor for medical advice about side effects. You may report side effects to FDA at 6-274-FDA-5416.  Where should I keep my medicine?  Keep out of the reach of children. This medicine can be abused. Keep your medicine in a safe place to protect it from theft. Do not share this medicine with anyone. Selling or giving away this medicine is dangerous and against the law.  Store at room temperature between 20 and 25 degrees C (68 and 77 degrees F). Throw away any unused medicine after the expiration date.  NOTE: This sheet is a summary. It may not cover all possible information. If you have questions about this medicine, talk to your doctor, pharmacist, or health care provider.  © 2014, [employer]. (12/3/2013 4:54:48 PM)      Depression / Suicide Risk    As you are discharged from this [facility] facility, it is important to learn how to keep safe from harming yourself.    Recognize the warning signs:  · Abrupt changes in personality, positive or negative- including increase in energy   · Giving away possessions  · Change in eating patterns- significant weight changes-  positive or negative  · Change in sleeping patterns- unable to sleep or sleeping all the time   · Unwillingness or inability to communicate  · Depression  · Unusual sadness, discouragement and loneliness  · Talk of wanting to die  · Neglect of personal  appearance   · Rebelliousness- reckless behavior  · Withdrawal from people/activities they love  · Confusion- inability to concentrate     If you or a loved one observes any of these behaviors or has concerns about self-harm, here's what you can do:  · Talk about it- your feelings and reasons for harming yourself  · Remove any means that you might use to hurt yourself (examples: pills, rope, extension cords, firearm)  · Get professional help from the community (Mental Health, Substance Abuse, psychological counseling)  · Do not be alone:Call your Safe Contact- someone whom you trust who will be there for you.  · Call your local CRISIS HOTLINE 326-9488 or 367-393-9824  · Call your local Children's Mobile Crisis Response Team [place] (110) 677-0103 or www.Yillio  · Call the toll free National Suicide Prevention Hotlines   · National Suicide Prevention Lifeline 535-883-NFFO (1850)  · National Hope Line Network 800-SUICIDE (371-3564)

## 2018-02-03 NOTE — CARE PLAN
Problem: Safety  Goal: Will remain free from falls  Outcome: PROGRESSING AS EXPECTED  Bed in lowest position, call light and bedside table within reach. Instructed patient to use call light for assistance. Patient verbalized understanding.

## 2018-02-03 NOTE — DIETARY
Nutrition Services:   Consult received for Diabetic diet education. Attempted to visit pt for diabetic diet education, pt was busy. Left handout at bedside. Of note pt is on a regular diet, pt would benefit from diabetic diet while admitted to aide in blood glucose control. RD will follow up for diet education.

## 2018-02-03 NOTE — PROGRESS NOTES
Discharge Summary    Instructions given to patient on discharge concerning medications, make an appointment with PCP, and s/s to look for when coming back to emergency room. Patient verbalized understanding. PIV removed from left hand and right fa with no s/s of bleeding or infections. Vitals WNL. Transport awaiting patient to finish dressing to take downstairs for cab.

## 2018-02-03 NOTE — PROGRESS NOTES
Assumed care at 1915. Bedside report received from Day RN Tatyana. Patient's chart and MAR reviewed. 12 hour chart check complete. Patient was updated on plan of care for the night. Questions answered and concerns addressed.  Pt denies any additional needs at this time. White board updated. Call light, phone and personal belongings within reach. Bed alarm on and working appropriately. Vital signs stable

## 2018-02-05 NOTE — DOCUMENTATION QUERY
DOCUMENTATION QUERY    PROVIDERS: Please select “Cosign w/ note” to reply to query.    To better represent the severity of illness of your patient, please review the following information and exercise your independent professional judgment in responding to this query.     Acute on chronic kidney failure is documented in the History and Physical and Progress Notes. Based upon the clinical findings, risk factors, and treatment, can this diagnosis of chronic kidney failure be further specified? (reference table below)    • Chronic Kidney Disease Stage I      • Chronic Kidney Disease Stage II     • Chronic Kidney Disease Stage III    • Chronic Kidney Disease Stage IV    • Chronic Kidney Disease Stage V     • CKD ruled out  • Other explanation of clinical findings  • Unable to determine    The medical record reflects the following:   Clinical Findings BUN 16 - 7  Cr 1.30 - 0.81  GFR 42 - >60    H&P: acute on chronic kidney failure - likely secondary to prerenal    2/2 Hosp PN: acute on chronic kidney failure - improved with IVFs; monitoring   Treatment IVF  Labs    Risk Factors Hypotension  BETTY  DM, type 2   Location within medical record History and Physical, Progress Notes and Lab Results      Definition of BETTY/ARF:  is an abrupt reduction in kidney function with increase in SCr of >/= 0.3 mg/dl   or SCr > 150% from baseline   or oliguria of < 0.5 ml/kg per hour for more than 6 hours.      Definitions of CKD stages & ESRD are documented in chart below.   CKD Stage    Description    GFR (mL/min/1.73 m2)      1    Kidney damage with normal or elevated GFR    Greater than 90      2    Kidney damage with mild decrease in GFR    60-89      3    Moderate decrease in GFR                       30-59      4    Severe decrease in GFR    15-29      5    Kidney failure  Less than 15  (or dialysis)      ESRD    Renal replacement therapy    Dialysis or transplantation   Source: National Kid[employer]    Thank you,   Stefani  Michele, RN, BSN  Clinical   [phone]

## 2018-02-06 LAB
BACTERIA BLD CULT: NORMAL
BACTERIA BLD CULT: NORMAL
SIGNIFICANT IND 70042: NORMAL
SIGNIFICANT IND 70042: NORMAL
SITE SITE: NORMAL
SITE SITE: NORMAL
SOURCE SOURCE: NORMAL
SOURCE SOURCE: NORMAL

## 2018-02-07 ENCOUNTER — APPOINTMENT (OUTPATIENT)
Dept: INTERNAL MEDICINE | Facility: MEDICAL CENTER | Age: 58
End: 2018-02-07
Payer: MEDICARE

## 2018-02-16 NOTE — TELEPHONE ENCOUNTER
1. Caller Name: Patient                      Call Back Number: [phone] (home)     2. Message: Patient called on 02/14 and left a message stating that she has gone to hospital twice this month because she has felt lightheaded. She would like a call back to let her know what to do.    3. Patient approves office to leave a detailed voicemail/MyChart message: N\A    I called her the day she left a message but stated she wouldn't be at home but I could still leave her a message. I couldn't leave a vm because one was not set up. I called today once again and no answer. She has appt for follow up on these episodes 02/26.

## 2018-02-20 NOTE — PROGRESS NOTES
SUBJECTIVE      Chief Complaint   Patient presents with   • Sore     poss bed sore lower back               Rash  This is a new problem. The problem has been gradually worsening since onset 1 wk ago. Pain location: buttock The rash is characterized by redness, swelling and pain. Pt was exposed to nothing. Pertinent negatives include no congestion, cough, fatigue, fever or shortness of breath. Past treatments include nothing.     History   Substance Use Topics   • Smoking status: Never Smoker    • Smokeless tobacco: No    • Alcohol Use: No      Past medical history  - DM-II      Current Outpatient Prescriptions on File Prior to Visit   Medication Sig Dispense Refill   • metoprolol SR (TOPROL XL) 100 MG TABLET SR 24 HR Take 100 mg by mouth every day.     • rivaroxaban (XARELTO) 20 MG Tab tablet Take 20 mg by mouth with dinner.     • cephALEXin (KEFLEX) 500 MG Cap Take 500 mg by mouth 3 times a day. Start date: 12/28/17 10 days     • metformin (GLUCOPHAGE) 500 MG Tab Take 500 mg by mouth 2 times a day, with meals.     • duloxetine (CYMBALTA) 30 MG Cap DR Particles Take 30 mg by mouth 2 times a day.     • hydrOXYzine (ATARAX) 50 MG TABS Take 50 mg by mouth every 6 hours as needed for Anxiety.       No current facility-administered medications on file prior to visit.            Family History   Problem Relation Age of Onset   • Diabetes Mother    • Heart Disease Father    • Cancer Father    • Diabetes Sister          Review of Systems   Constitutional: Negative for fever and fatigue.   HENT: Negative for congestion.    Respiratory: Negative for cough and shortness of breath.    Cardiovascular: Negative for chest pain.   Gastrointestinal: Negative for abdominal pain.   Skin: Positive for rash.   Neurological: Negative for dizziness.   All other systems reviewed and are negative.         Objective:     Blood pressure 102/58, pulse 100, temperature 36.8 °C (98.2 °F), weight 74.8 kg (165 lb), SpO2 98 %.      Physical Exam    Constitutional: pt is oriented to person, place, and time. Pt appears well-developed and well-nourished. No distress.   HENT:   Head: Normocephalic and atraumatic.   Eyes: Conjunctivae are normal. No scleral icterus.   Cardiovascular: Normal rate and regular rhythm.    Pulmonary/Chest: Effort normal and breath sounds normal. No respiratory distress.        Neurological: pt is alert and oriented to person, place, and time. No cranial nerve deficit.   Skin: Skin is warm. Pt is not diaphoretic.      Shallow 2cm ulceration over superior gluteal cleft with surrounding small area of erythema, warmth, tenderness.   No drainage      Nursing note and vitals reviewed.              Assessment/Plan:     1. Decubitus ulcer of buttock, stage 1, unspecified laterality     Wound was cleaned and dressed w/neosporin, telfa  - clindamycin (CLEOCIN) 300 MG Cap; Take 1 Cap by mouth 3 times a day for 7 days.  Dispense: 21 Cap; Refill: 0  -wound care instructions given   REFERRAL TO WOUND CLINIC

## 2018-02-20 NOTE — PATIENT INSTRUCTIONS
Pressure Ulcer  A pressure ulcer is a sore that has formed from the breakdown of skin and exposure of deeper layers of tissue. It develops in areas of the body where there is unrelieved pressure. Pressure ulcers are usually found over a bony area, such as the shoulder blades, spine, lower back, hips, knees, ankles, and heels.  Pressure ulcers vary in severity. Your health care provider may determine the severity (stage) of your pressure ulcer. The stages include:  · Stage I--The skin is red, and when the skin is pressed, it stays red.  · Stage II--The top layer of skin is gone, and there is a shallow, pink ulcer.  · Stage III--The ulcer becomes deeper, and it is more difficult to see the whole wound. Also, there may be yellow or brown parts, as well as pink and red parts.  · Stage IV--The ulcer may be deep and red, pink, brown, white, or yellow. Bone or muscle may be seen.  · Unstageable pressure ulcer--The ulcer is covered almost completely with black, brown, or yellow tissue. It is not known how deep the ulcer is or what stage it is until this covering comes off.  · Suspected deep tissue injury--A person's skin can be injured from pressure or pulling on the skin when his or her position is changed. The skin appears purple or maroon. There may not be an opening in the skin, but there could be a blood-filled blister. This deep tissue injury is often difficult to see in people with darker skin tones. The site may open and become deeper in time. However, early interventions will help the area heal and may prevent the area from opening.  CAUSES  Pressure ulcers are caused by pressure against the skin that limits the flow of blood to the skin and nearby tissues. There are many risk factors that can lead to pressure sores.  RISK FACTORS  · Decreased ability to move.  · Decreased ability to feel pain or discomfort.  · Excessive skin moisture from urine, stool, sweat, or secretions.  · Poor  nutrition.  · Dehydration.  · Tobacco, drug, or alcohol abuse.  · Having someone pull on bedsheets that are under you, such as when health care workers are changing your position in a hospital bed.  · Obesity.  · Increased adult age.  · Hospitalization in a critical care unit for longer than 4 days with use of medical devices.  · Prolonged use of medical devices.  · Critical illness.  · Anemia.  · Traumatic brain injury.  · Spinal cord injury.  · Stroke.  · Diabetes.  · Poor blood glucose control.  · Low blood pressure (hypotension).  · Low oxygen levels.  · Medicines that reduce blood flow.  · Infection.  · Being of -American or / descent.  · Loss of bladder and bowel control (incontinence).  · Malnutrition.  If you are at risk for pressure ulcers, your health care provider may recommend certain types of bedding to help prevent them. Bedding options may include foam or gel mattresses that are covered with one of these:  · A sheepskin blanket.  · A pad that is filled with gel, air, water, or foam.  DIAGNOSIS  Your health care provider will diagnose your pressure ulcer based on its appearance. The health care provider may determine the stage of your pressure ulcer as well. Tests may be done to check for infection, to assess your circulation, or to check for other diseases, such as diabetes.  TREATMENT  Treatment of your pressure ulcer begins with determining what stage the ulcer is in. Your treatment team may include your health care provider, a wound care specialist, a nutritionist, a physical therapist, and a surgeon. Possible treatments may include:  · Moving or repositioning every 1-2 hours.  · Using beds or mattresses to shift your body weight and pressure points frequently.  · Improving your diet.  · Cleaning and bandaging (dressing) the open wound.  · Giving antibiotic medicines.  · Removing damaged tissue.  · Surgery and sometimes skin grafts.  HOME CARE INSTRUCTIONS  · If you were  hospitalized, follow the care plan that was started in the hospital.  · Avoid staying in the same position for more than 2 hours. Use padding, devices, or mattresses to cushion your pressure points as directed by your health care provider.  · Eat a well-balanced diet. Take nutritional supplements and vitamins as directed by your health care provider.  · Keep all follow-up appointments.  · Only take over-the-counter or prescription medicines for pain, fever, or discomfort as directed by your health care provider.  SEEK MEDICAL CARE IF:  · Your pressure ulcer is not improving.  · You do not know how to care for your pressure ulcer.  · You notice other areas of redness on your skin.  · You have a fever.  SEEK IMMEDIATE MEDICAL CARE IF:  · You have increasing redness, swelling, or pain in your pressure ulcer.  · You notice pus coming from your pressure ulcer.  · You notice a bad smell coming from the wound or dressing.  · Your pressure ulcer opens up again.     This information is not intended to replace advice given to you by your health care provider. Make sure you discuss any questions you have with your health care provider.     Document Released: 12/18/2006 Document Revised: 01/08/2016 Document Reviewed: 08/25/2014  [employer] Interactive Patient Education ©2016 [employer]

## 2018-02-21 NOTE — TELEPHONE ENCOUNTER
Tiago Higgins M.D.  Bishnu Espitia Ass't   Caller: Unspecified (5 days ago,  2:35 PM)             I would need to discuss this with her at her appointment. Could you try her again and tell her she needs to come in as scheduled? I believe she no-showed for her last two appointments.      Called patient and spoke with her to see how she was doing. She states she is doing well just in a little pain because of a bed sore she got on her bottom. She would like for the appointments that she got noted as no show to be known that she was in the hospital most of those times. So, she would like to just know that it isn't because she didn't want to come but wasn't able to make it.

## 2018-02-26 PROBLEM — L89.90 PRESSURE ULCER: Status: ACTIVE | Noted: 2018-01-01

## 2018-02-26 NOTE — PROGRESS NOTES
Established Patient    Vin presents today with the following:    CC: pressure ulcer, diabetes    HPI: 57 year old female with a history of T2DM, HTN, DVT on Xarelto presents for medication refills and the following complaints:    Type 2 diabetes mellitus  States has been to hospital twice for DKA, doesn't remember being given insulin sliding scale last visit. States she uses Lantus every night. States she doesn't understand what foods make her blood sugar go up. Wants to control her blood sugar so she doesn't go to the hospital anymore.    Decubitus ulcer of sacral region, stage 2  Denies fever, chills, pain. Has roommate change bandage.    Smoking  States is not ready to quit at this time.    Anxiety  Insomnia  Chronic pain  States she previous got tramadol and zolpidem from her prior PCP. Denies ever seeing pain specialist. Is requesting refills.      Patient Active Problem List    Diagnosis Date Noted   • DKA (diabetic ketoacidoses) (CMS-HCC) 01/22/2018     Priority: High   • Metabolic encephalopathy 01/22/2018     Priority: High   • History of DVT (deep vein thrombosis) 12/04/2017     Priority: Low   • Smoking 01/26/2018   • Hyperosmolarity due to secondary diabetes mellitus (CMS-HCC) 12/04/2017   • Diabetes (CMS-HCC) 12/04/2017   • Hypertension 12/04/2017   • Hyperlipidemia 12/04/2017   • Depression 12/04/2017   • CKD (chronic kidney disease) stage 3, GFR 30-59 ml/min 12/04/2017   • Hypertriglyceridemia 12/04/2017   • Hyperglycemia 12/03/2017       Current Outpatient Prescriptions   Medication Sig Dispense Refill   • omeprazole (PRILOSEC) 20 MG delayed-release capsule Take 20 mg by mouth every day.     • cyclobenzaprine (FLEXERIL) 10 MG Tab Take 10 mg by mouth 3 times a day as needed.     • clonazePAM (KLONOPIN) 1 MG Tab Take 0.5 mg by mouth 2 times a day.     • spironolactone (ALDACTONE) 50 MG Tab Take 50 mg by mouth every day.     • zolpidem (AMBIEN) 5 MG Tab Take 5 mg by mouth at bedtime as needed  "for Sleep.     • tramadol (ULTRAM) 50 MG Tab Take 50 mg by mouth every four hours as needed.     • hydroCHLOROthiazide (HYDRODIURIL) 50 MG Tab Take 50 mg by mouth every day.     • duloxetine (CYMBALTA) 30 MG Cap DR Particles Take 30 mg by mouth 2 times a day.     • clindamycin (CLEOCIN) 300 MG Cap Take 1 Cap by mouth 3 times a day for 7 days. 21 Cap 0   • metoprolol SR (TOPROL XL) 100 MG TABLET SR 24 HR Take 100 mg by mouth every day.     • rivaroxaban (XARELTO) 20 MG Tab tablet Take 20 mg by mouth with dinner.     • metformin (GLUCOPHAGE) 500 MG Tab Take 500 mg by mouth 2 times a day, with meals.     • hydrOXYzine (ATARAX) 50 MG TABS Take 50 mg by mouth every 6 hours as needed for Anxiety.       No current facility-administered medications for this visit.        ROS: As per HPI. Additional pertinent symptoms as noted below.    Constitutional: Denies weight loss, fever, chills, weakness, malaise.  Eyes: Denies new visual loss, blurred vision, double vision, yellow sclerae.  ENT: Denies new hearing loss, sneezing, congestion, runny nose, sore throat.  Cardiovascular: Denies chest pain, chest pressure, chest discomfort, palpitations.  Respiratory: Denies new shortness of breath, cough, sputum.  GI: Denies anorexia, nausea, vomiting, diarrhea, abdominal pain.  : Denies burning on urination, new increased frequency.  Musculo-skeletal: Denies new muscle, back pain, joint pain, stiffness.  Skin: Slightly smaller than dime sized stage 2 pressure ulcer without surrounding erythema or induration located on right superior buttock in intergluteal cleft  Neurological: Denies paresthesias, fasciculations, seizures, focal weakness.  Psychological: Denies change in personality, affect, depression.  All others negative    /76   Pulse (!) 116   Temp 36.4 °C (97.6 °F)   Ht 1.6 m (5' 3\")   SpO2 99%     Physical Exam   Constitutional: No acute distress.   Eyes: Pupils are equal, round, and reactive to light. Extra ocular " movements intact. No scleral icterus.  Neck: Neck supple. No thyromegaly present.   Cardiovascular: Normal rate and regular rhythm. No gallop, no friction rub. No murmurs.  Pulmonary/Chest: Breath sounds normal. No wheezing or rhonchi. No accessory muscle use.  Musculoskeletal: No edema.   Lymphadenopathy: No cervical adenopathy.  Neurological: Alert and oriented to person, place, and time.   Skin: No cyanosis. Nails show no clubbing.    Note: I have reviewed all pertinent labs and diagnostic tests associated with this visit with specific comments listed under the assessment and plan below    Assessment and Plan    1. Gastroesophageal reflux disease, esophagitis presence not specified  Requesting refill, denies symptoms.  - omeprazole (PRILOSEC) 20 MG delayed-release capsule; Take 1 Cap by mouth every day.  Dispense: 30 Cap; Refill: 3    2. Degenerative disc disease, lumbar  Requesting refill, denies symptoms.  - cyclobenzaprine (FLEXERIL) 10 MG Tab; Take 1 Tab by mouth 3 times a day as needed.  Dispense: 30 Tab; Refill: 0    3. Hypertension, unspecified type  Well controlled, today 110/76. Requesting refills.  - spironolactone (ALDACTONE) 50 MG Tab; Take 1 Tab by mouth every day.  Dispense: 30 Tab; Refill: 3  - hydroCHLOROthiazide (HYDRODIURIL) 50 MG Tab; Take 1 Tab by mouth every day.  Dispense: 30 Tab; Refill: 3    4. Depression, unspecified depression type  Requesting refill. Denies symptoms.  - DULoxetine (CYMBALTA) 30 MG Cap DR Particles; Take 1 Cap by mouth 2 times a day.  Dispense: 30 Cap; Refill: 3    5. Type 2 diabetes mellitus with complication, with long-term current use of insulin (CMS-HCC)  Uncontrolled, last A1c 15.6 two months ago. Needs sub-specialist for control due to recurrent hospitalizations.  - REFERRAL TO ENDOCRINOLOGY  - REFERRAL TO DIABETIC EDUCATION Diabetes Self Management Education / Training (DSME/T) and Medical Nutrition Therapy (MNT): Initial Group DSME/MNT as authorized by payor,  Follow-Up DSME/MNT as authorized by payor; DSME/T Content: Monitoring Diabete...    6. Anxiety  Requesting refill of clonazepam, discussed non-pharmacologic option of mental health referral.  - REFERRAL TO PSYCHIATRY    7. Hyperlipidemia, unspecified hyperlipidemia type  Ran out of medication a month ago, requesting new prescription.  - atorvastatin (LIPITOR) 40 MG Tab; Take 1 Tab by mouth every day.  Dispense: 30 Tab; Refill: 3    8. Decubitus ulcer of sacral region, stage 2  2 recent hospitalizations to ICU for DKA in the setting of obesity and poor wound healing due to DM.  - Topical OTC A+D  - Shower PRN, dressing when dry    9. Smoking  Not interested in quitting.  - Revisit next appointment.    10. Insomnia  11. Chronic pain  Requesting refill of tramadol and zolpidem.   - Referral to pain clinic    Followup: Return in about 5 weeks (around 4/2/2018).      Signed by: Tiago Higgins M.D.

## 2018-02-26 NOTE — PATIENT INSTRUCTIONS
Apply A+D to bed sore, ok to shower, use bandage when not in shower  Schedule Endocrinology, Pain Clinic, Psychiatry, Diabetic Education

## 2018-06-02 NOTE — ED NOTES
at bedside for blood draw.    [facility] Progress Note    Subjective:  Feels her breathing is better.      Objective:  /55 (BP Location: Right arm)   Pulse 60   Temp 98.4 °F (36.9 °C) (Oral)   Resp 20   Wt 170 lb 8 oz (77.3 kg)   SpO2 93%   BMI 28.37 kg/m²     Telemetry:

## 2018-07-25 NOTE — PROGRESS NOTES
Outcome: Left Message    Please transfer to Patient Outreach Team at [phone] when patient returns call.    WebIZ Checked & Epic Updated:  yes    [facility] Verified: no    Attempt # 1

## 2018-10-28 NOTE — PROGRESS NOTES
Subjective:      Vin Maciel is a 58 y.o. female who presents with Foot Problem (left little toe black bump )        Foot Problem     Patient presents today for about 1-2 days of noticed black bump on her left pinky toe.  Patients states she has loss of sensation to her feet and toes due to hx of diabetes.  She states that her  here with her pointed out that her toe looked black.  She states she tried to look at and felt it was a blister. She does not recall trauma.  She notes she mostly wears her house slippers and cannot attribute any footwear from rubbing to create this. She has not noticed the area worsening since the discovery and has not noticed any swelling to the toe or redness advancing outside of the toe. No fevers.  No attempted interventions.  Does not have a podiatrist but would like one.     Patient also requests refill of her albuterol inhaler.  She states she gets asthma generally related to seasonal allergies.     Review of Systems   Constitutional: Negative.    HENT: Negative.    Respiratory: Positive for wheezing.         SEE HPI   Cardiovascular: Negative.    Musculoskeletal: Negative.    Skin:        SEE HPI   Neurological: Positive for sensory change (chronic, bilateral feet).   Endo/Heme/Allergies: Negative.    Psychiatric/Behavioral: Negative.        PMH:  has a past medical history of DM (diabetes mellitus) (HCC) and HTN (hypertension).  MEDS:   Current Outpatient Prescriptions:   •  cephALEXin (KEFLEX) 500 MG Cap, Take 1 Cap by mouth 3 times a day for 7 days., Disp: 21 Cap, Rfl: 0  •  albuterol 108 (90 Base) MCG/ACT Aero Soln inhalation aerosol, Inhale 2 Puffs by mouth every 6 hours as needed for Shortness of Breath., Disp: 8.5 g, Rfl: 0  •  omeprazole (PRILOSEC) 20 MG delayed-release capsule, Take 1 Cap by mouth every day., Disp: 30 Cap, Rfl: 3  •  clonazePAM (KLONOPIN) 1 MG Tab, Take 0.5 mg by mouth 2 times a day., Disp: , Rfl:   •  zolpidem (AMBIEN) 5 MG Tab, Take 5 mg by mouth  "at bedtime as needed for Sleep., Disp: , Rfl:   •  tramadol (ULTRAM) 50 MG Tab, Take 50 mg by mouth every four hours as needed., Disp: , Rfl:   •  spironolactone (ALDACTONE) 50 MG Tab, Take 1 Tab by mouth every day., Disp: 30 Tab, Rfl: 3  •  metoprolol SR (TOPROL XL) 100 MG TABLET SR 24 HR, Take 100 mg by mouth every day., Disp: , Rfl:   •  rivaroxaban (XARELTO) 20 MG Tab tablet, Take 20 mg by mouth with dinner., Disp: , Rfl:   •  metformin (GLUCOPHAGE) 500 MG Tab, Take 500 mg by mouth 2 times a day, with meals., Disp: , Rfl:   •  hydrOXYzine (ATARAX) 50 MG TABS, Take 50 mg by mouth every 6 hours as needed for Anxiety., Disp: , Rfl:   ALLERGIES:   Allergies   Allergen Reactions   • Sulfa Drugs Nausea     N/V nervousness     SURGHX:   Past Surgical History:   Procedure Laterality Date   • APPENDECTOMY     • CHOLECYSTECTOMY     • [facility]  DELIVERY SER     • HYSTERECTOMY, TOTAL ABDOMINAL     • LAMINOTOMY     • OTHER      back, neck, shoulder surgeries   • OTHER ORTHOPEDIC SURGERY       SOCHX:  reports that she has been smoking.  She has been smoking about 1.00 pack per day. She has never used smokeless tobacco. She reports that she does not drink alcohol or use drugs.  FH: Family history was reviewed, no pertinent findings to report   Objective:     /80   Pulse (!) 105   Temp 37.1 °C (98.8 °F) (Temporal)   Resp 15   Ht 1.6 m (5' 3\")   Wt 74.8 kg (165 lb)   SpO2 96%   BMI 29.23 kg/m²      Physical Exam   Constitutional: She is oriented to person, place, and time. She appears well-developed and well-nourished. No distress.   HENT:   Head: Normocephalic and atraumatic.   Nose: Nose normal.   Eyes: Conjunctivae and EOM are normal.   Cardiovascular: Normal rate and regular rhythm.    Pulmonary/Chest: Effort normal. No respiratory distress. She has wheezes (few scattered end exp wheezes. ). She has no rales.   Musculoskeletal: Normal range of motion.        Feet:    Neurological: She is alert and oriented " to person, place, and time.   Skin: Skin is warm and dry.   Psychiatric: She has a normal mood and affect. Her behavior is normal.   Vitals reviewed.          Assessment/Plan:     1. Infected blister  cephALEXin (KEFLEX) 500 MG Cap    REFERRAL TO PODIATRY   2. Asthma due to seasonal allergies  albuterol 108 (90 Base) MCG/ACT Aero Soln inhalation aerosol   3. Type 2 diabetes mellitus with diabetic polyneuropathy, with long-term current use of insulin ([facility])  REFERRAL TO PODIATRY         -care at home discussed.  Recommend avoidance of rubbing, consider new shoes.   -will cover as above, concern for mild secondary infection, hx of polyneuropathy/DM.   Patient would like to establish with podiatry.  Referral placed  -albuterol HFA renewed  -advised PCP follow up       Supportive care, differential diagnoses, and indications for immediate follow-up discussed with patient.   Pathogenesis of diagnosis discussed including typical length and natural progression.   Instructed to return to clinic or nearest emergency department for any change in condition, further concerns, or worsening of symptoms.  Patient states understanding of the plan of care and discharge instructions.      Telma Montoya P.A.-C.

## 2019-01-01 ENCOUNTER — APPOINTMENT (OUTPATIENT)
Dept: RADIOLOGY | Facility: MEDICAL CENTER | Age: 59
DRG: 853 | End: 2019-01-01
Attending: EMERGENCY MEDICINE
Payer: MEDICARE

## 2019-01-01 ENCOUNTER — HOSPITAL ENCOUNTER (INPATIENT)
Facility: MEDICAL CENTER | Age: 59
LOS: 2 days | DRG: 853 | End: 2019-02-14
Attending: EMERGENCY MEDICINE | Admitting: INTERNAL MEDICINE
Payer: MEDICARE

## 2019-01-01 ENCOUNTER — APPOINTMENT (OUTPATIENT)
Dept: RADIOLOGY | Facility: MEDICAL CENTER | Age: 59
DRG: 853 | End: 2019-01-01
Attending: INTERNAL MEDICINE
Payer: MEDICARE

## 2019-01-01 ENCOUNTER — APPOINTMENT (OUTPATIENT)
Dept: RADIOLOGY | Facility: MEDICAL CENTER | Age: 59
DRG: 853 | End: 2019-01-01
Attending: STUDENT IN AN ORGANIZED HEALTH CARE EDUCATION/TRAINING PROGRAM
Payer: MEDICARE

## 2019-01-01 VITALS
HEIGHT: 62 IN | BODY MASS INDEX: 26.09 KG/M2 | TEMPERATURE: 101.3 F | DIASTOLIC BLOOD PRESSURE: 55 MMHG | OXYGEN SATURATION: 75 % | WEIGHT: 141.76 LBS | SYSTOLIC BLOOD PRESSURE: 112 MMHG

## 2019-01-01 DIAGNOSIS — I95.89 HYPOTENSION DUE TO HYPOVOLEMIA: Primary | ICD-10-CM

## 2019-01-01 DIAGNOSIS — E86.0 DEHYDRATION: ICD-10-CM

## 2019-01-01 DIAGNOSIS — E10.10 DIABETIC KETOACIDOSIS WITHOUT COMA ASSOCIATED WITH TYPE 1 DIABETES MELLITUS (HCC): ICD-10-CM

## 2019-01-01 DIAGNOSIS — E86.1 HYPOTENSION DUE TO HYPOVOLEMIA: Primary | ICD-10-CM

## 2019-01-01 DIAGNOSIS — I48.91 ATRIAL FIBRILLATION WITH RVR (HCC): ICD-10-CM

## 2019-01-01 DIAGNOSIS — A41.9 SEPSIS, DUE TO UNSPECIFIED ORGANISM: ICD-10-CM

## 2019-01-01 DIAGNOSIS — N39.0 ACUTE UTI: ICD-10-CM

## 2019-01-01 LAB
ABO GROUP BLD: NORMAL
ABO GROUP BLD: NORMAL
ACTION RANGE TRIGGERED IACRT: NO
ACTION RANGE TRIGGERED IACRT: YES
ALBUMIN SERPL BCP-MCNC: 2.7 G/DL (ref 3.2–4.9)
ALBUMIN SERPL BCP-MCNC: 2.8 G/DL (ref 3.2–4.9)
ALBUMIN SERPL BCP-MCNC: 2.9 G/DL (ref 3.2–4.9)
ALBUMIN SERPL BCP-MCNC: 3.7 G/DL (ref 3.2–4.9)
ALBUMIN SERPL BCP-MCNC: 3.8 G/DL (ref 3.2–4.9)
ALBUMIN/GLOB SERPL: 1.2 G/DL
ALBUMIN/GLOB SERPL: 1.6 G/DL
ALBUMIN/GLOB SERPL: 1.8 G/DL
ALP SERPL-CCNC: 106 U/L (ref 30–99)
ALP SERPL-CCNC: 127 U/L (ref 30–99)
ALP SERPL-CCNC: 134 U/L (ref 30–99)
ALP SERPL-CCNC: 169 U/L (ref 30–99)
ALP SERPL-CCNC: 86 U/L (ref 30–99)
ALT SERPL-CCNC: 10 U/L (ref 2–50)
ALT SERPL-CCNC: 12 U/L (ref 2–50)
ALT SERPL-CCNC: 12 U/L (ref 2–50)
ALT SERPL-CCNC: 7 U/L (ref 2–50)
ALT SERPL-CCNC: 8 U/L (ref 2–50)
AMMONIA PLAS-SCNC: 53 UMOL/L (ref 11–45)
AMPHET UR QL SCN: NEGATIVE
ANION GAP SERPL CALC-SCNC: 10 MMOL/L (ref 0–11.9)
ANION GAP SERPL CALC-SCNC: 13 MMOL/L (ref 0–11.9)
ANION GAP SERPL CALC-SCNC: 18 MMOL/L (ref 0–11.9)
ANION GAP SERPL CALC-SCNC: 18 MMOL/L (ref 0–11.9)
ANION GAP SERPL CALC-SCNC: 21 MMOL/L (ref 0–11.9)
ANION GAP SERPL CALC-SCNC: 22 MMOL/L (ref 0–11.9)
ANION GAP SERPL CALC-SCNC: 22 MMOL/L (ref 0–11.9)
ANION GAP SERPL CALC-SCNC: 28 MMOL/L (ref 0–11.9)
ANION GAP SERPL CALC-SCNC: 40 MMOL/L (ref 0–11.9)
ANION GAP SERPL CALC-SCNC: 9 MMOL/L (ref 0–11.9)
ANISOCYTOSIS BLD QL SMEAR: ABNORMAL
ANISOCYTOSIS BLD QL SMEAR: ABNORMAL
APAP SERPL-MCNC: <10 UG/ML (ref 10–30)
APPEARANCE UR: ABNORMAL
APTT PPP: 27.1 SEC (ref 24.7–36)
AST SERPL-CCNC: 13 U/L (ref 12–45)
AST SERPL-CCNC: 14 U/L (ref 12–45)
AST SERPL-CCNC: 14 U/L (ref 12–45)
AST SERPL-CCNC: 15 U/L (ref 12–45)
AST SERPL-CCNC: 18 U/L (ref 12–45)
B-OH-BUTYR SERPL-MCNC: >8 MMOL/L (ref 0.02–0.27)
BACTERIA #/AREA URNS HPF: NEGATIVE /HPF
BARBITURATES UR QL SCN: NEGATIVE
BASE EXCESS BLDA CALC-SCNC: -6 MMOL/L (ref -4–3)
BASE EXCESS BLDA CALC-SCNC: -9 MMOL/L (ref -4–3)
BASE EXCESS BLDV CALC-SCNC: -15 MMOL/L
BASOPHILS # BLD AUTO: 0 % (ref 0–1.8)
BASOPHILS # BLD AUTO: 0.4 % (ref 0–1.8)
BASOPHILS # BLD AUTO: 1.8 % (ref 0–1.8)
BASOPHILS # BLD: 0 K/UL (ref 0–0.12)
BASOPHILS # BLD: 0.04 K/UL (ref 0–0.12)
BASOPHILS # BLD: 0.09 K/UL (ref 0–0.12)
BENZODIAZ UR QL SCN: NEGATIVE
BILIRUB SERPL-MCNC: 0.6 MG/DL (ref 0.1–1.5)
BILIRUB SERPL-MCNC: 0.7 MG/DL (ref 0.1–1.5)
BILIRUB SERPL-MCNC: 0.7 MG/DL (ref 0.1–1.5)
BILIRUB SERPL-MCNC: 0.9 MG/DL (ref 0.1–1.5)
BILIRUB SERPL-MCNC: 1.2 MG/DL (ref 0.1–1.5)
BILIRUB UR QL STRIP.AUTO: NEGATIVE
BLD GP AB SCN SERPL QL: NORMAL
BODY TEMPERATURE: ABNORMAL CENTIGRADE
BODY TEMPERATURE: ABNORMAL DEGREES
BODY TEMPERATURE: ABNORMAL DEGREES
BUN SERPL-MCNC: 102 MG/DL (ref 8–22)
BUN SERPL-MCNC: 113 MG/DL (ref 8–22)
BUN SERPL-MCNC: 76 MG/DL (ref 8–22)
BUN SERPL-MCNC: 82 MG/DL (ref 8–22)
BUN SERPL-MCNC: 84 MG/DL (ref 8–22)
BUN SERPL-MCNC: 85 MG/DL (ref 8–22)
BUN SERPL-MCNC: 86 MG/DL (ref 8–22)
BUN SERPL-MCNC: 94 MG/DL (ref 8–22)
BUN SERPL-MCNC: 95 MG/DL (ref 8–22)
BUN SERPL-MCNC: 97 MG/DL (ref 8–22)
BZE UR QL SCN: NEGATIVE
CALCIUM SERPL-MCNC: 7.2 MG/DL (ref 8.5–10.5)
CALCIUM SERPL-MCNC: 7.4 MG/DL (ref 8.5–10.5)
CALCIUM SERPL-MCNC: 7.4 MG/DL (ref 8.5–10.5)
CALCIUM SERPL-MCNC: 7.6 MG/DL (ref 8.5–10.5)
CALCIUM SERPL-MCNC: 7.7 MG/DL (ref 8.5–10.5)
CALCIUM SERPL-MCNC: 7.8 MG/DL (ref 8.5–10.5)
CALCIUM SERPL-MCNC: 8 MG/DL (ref 8.5–10.5)
CALCIUM SERPL-MCNC: 8.1 MG/DL (ref 8.5–10.5)
CALCIUM SERPL-MCNC: 8.4 MG/DL (ref 8.5–10.5)
CALCIUM SERPL-MCNC: 9.1 MG/DL (ref 8.5–10.5)
CANNABINOIDS UR QL SCN: NEGATIVE
CHLORIDE SERPL-SCNC: 104 MMOL/L (ref 96–112)
CHLORIDE SERPL-SCNC: 106 MMOL/L (ref 96–112)
CHLORIDE SERPL-SCNC: 110 MMOL/L (ref 96–112)
CHLORIDE SERPL-SCNC: 115 MMOL/L (ref 96–112)
CHLORIDE SERPL-SCNC: 118 MMOL/L (ref 96–112)
CHLORIDE SERPL-SCNC: 72 MMOL/L (ref 96–112)
CHLORIDE SERPL-SCNC: 85 MMOL/L (ref 96–112)
CHLORIDE SERPL-SCNC: 96 MMOL/L (ref 96–112)
CHLORIDE SERPL-SCNC: 97 MMOL/L (ref 96–112)
CHLORIDE SERPL-SCNC: 99 MMOL/L (ref 96–112)
CHOLEST SERPL-MCNC: 51 MG/DL (ref 100–199)
CK SERPL-CCNC: 128 U/L (ref 0–154)
CK SERPL-CCNC: 135 U/L (ref 0–154)
CO2 BLDA-SCNC: 18 MMOL/L (ref 20–33)
CO2 BLDA-SCNC: 19 MMOL/L (ref 20–33)
CO2 SERPL-SCNC: 14 MMOL/L (ref 20–33)
CO2 SERPL-SCNC: 15 MMOL/L (ref 20–33)
CO2 SERPL-SCNC: 17 MMOL/L (ref 20–33)
CO2 SERPL-SCNC: 18 MMOL/L (ref 20–33)
CO2 SERPL-SCNC: 18 MMOL/L (ref 20–33)
CO2 SERPL-SCNC: 20 MMOL/L (ref 20–33)
CO2 SERPL-SCNC: 21 MMOL/L (ref 20–33)
CO2 SERPL-SCNC: 22 MMOL/L (ref 20–33)
COLOR UR: YELLOW
COMMENT 1642: NORMAL
CORTIS SERPL-MCNC: 98.7 UG/DL (ref 0–23)
CREAT SERPL-MCNC: 2.09 MG/DL (ref 0.5–1.4)
CREAT SERPL-MCNC: 2.41 MG/DL (ref 0.5–1.4)
CREAT SERPL-MCNC: 2.49 MG/DL (ref 0.5–1.4)
CREAT SERPL-MCNC: 2.63 MG/DL (ref 0.5–1.4)
CREAT SERPL-MCNC: 2.7 MG/DL (ref 0.5–1.4)
CREAT SERPL-MCNC: 3.22 MG/DL (ref 0.5–1.4)
CREAT SERPL-MCNC: 3.51 MG/DL (ref 0.5–1.4)
CREAT SERPL-MCNC: 3.65 MG/DL (ref 0.5–1.4)
CREAT SERPL-MCNC: 4.24 MG/DL (ref 0.5–1.4)
CREAT SERPL-MCNC: 4.54 MG/DL (ref 0.5–1.4)
EKG IMPRESSION: NORMAL
EOSINOPHIL # BLD AUTO: 0 K/UL (ref 0–0.51)
EOSINOPHIL # BLD AUTO: 0 K/UL (ref 0–0.51)
EOSINOPHIL # BLD AUTO: 0.01 K/UL (ref 0–0.51)
EOSINOPHIL NFR BLD: 0 % (ref 0–6.9)
EOSINOPHIL NFR BLD: 0 % (ref 0–6.9)
EOSINOPHIL NFR BLD: 0.1 % (ref 0–6.9)
EPI CELLS #/AREA URNS HPF: NEGATIVE /HPF
ERYTHROCYTE [DISTWIDTH] IN BLOOD BY AUTOMATED COUNT: 37.9 FL (ref 35.9–50)
ERYTHROCYTE [DISTWIDTH] IN BLOOD BY AUTOMATED COUNT: 38.8 FL (ref 35.9–50)
ERYTHROCYTE [DISTWIDTH] IN BLOOD BY AUTOMATED COUNT: 40 FL (ref 35.9–50)
ERYTHROCYTE [DISTWIDTH] IN BLOOD BY AUTOMATED COUNT: 51.1 FL (ref 35.9–50)
EST. AVERAGE GLUCOSE BLD GHB EST-MCNC: 456 MG/DL
ETHANOL BLD-MCNC: 0 G/DL
ETHANOL BLD-MCNC: 0 G/DL
GLOBULIN SER CALC-MCNC: 1.7 G/DL (ref 1.9–3.5)
GLOBULIN SER CALC-MCNC: 2.1 G/DL (ref 1.9–3.5)
GLOBULIN SER CALC-MCNC: 2.2 G/DL (ref 1.9–3.5)
GLOBULIN SER CALC-MCNC: 2.4 G/DL (ref 1.9–3.5)
GLOBULIN SER CALC-MCNC: 3.3 G/DL (ref 1.9–3.5)
GLUCOSE BLD-MCNC: 119 MG/DL (ref 65–99)
GLUCOSE BLD-MCNC: 148 MG/DL (ref 65–99)
GLUCOSE BLD-MCNC: 151 MG/DL (ref 65–99)
GLUCOSE BLD-MCNC: 157 MG/DL (ref 65–99)
GLUCOSE BLD-MCNC: 177 MG/DL (ref 65–99)
GLUCOSE BLD-MCNC: 178 MG/DL (ref 65–99)
GLUCOSE BLD-MCNC: 180 MG/DL (ref 65–99)
GLUCOSE BLD-MCNC: 181 MG/DL (ref 65–99)
GLUCOSE BLD-MCNC: 184 MG/DL (ref 65–99)
GLUCOSE BLD-MCNC: 188 MG/DL (ref 65–99)
GLUCOSE BLD-MCNC: 194 MG/DL (ref 65–99)
GLUCOSE BLD-MCNC: 205 MG/DL (ref 65–99)
GLUCOSE BLD-MCNC: 206 MG/DL (ref 65–99)
GLUCOSE BLD-MCNC: 207 MG/DL (ref 65–99)
GLUCOSE BLD-MCNC: 228 MG/DL (ref 65–99)
GLUCOSE BLD-MCNC: 303 MG/DL (ref 65–99)
GLUCOSE BLD-MCNC: 309 MG/DL (ref 65–99)
GLUCOSE BLD-MCNC: 366 MG/DL (ref 65–99)
GLUCOSE BLD-MCNC: 436 MG/DL (ref 65–99)
GLUCOSE BLD-MCNC: 464 MG/DL (ref 65–99)
GLUCOSE BLD-MCNC: 504 MG/DL (ref 65–99)
GLUCOSE BLD-MCNC: 515 MG/DL (ref 65–99)
GLUCOSE BLD-MCNC: 522 MG/DL (ref 65–99)
GLUCOSE BLD-MCNC: 523 MG/DL (ref 65–99)
GLUCOSE BLD-MCNC: 548 MG/DL (ref 65–99)
GLUCOSE BLD-MCNC: 548 MG/DL (ref 65–99)
GLUCOSE BLD-MCNC: >600 MG/DL (ref 65–99)
GLUCOSE SERPL-MCNC: 1255 MG/DL (ref 65–99)
GLUCOSE SERPL-MCNC: 1659 MG/DL (ref 65–99)
GLUCOSE SERPL-MCNC: 1771 MG/DL (ref 65–99)
GLUCOSE SERPL-MCNC: 245 MG/DL (ref 65–99)
GLUCOSE SERPL-MCNC: 432 MG/DL (ref 65–99)
GLUCOSE SERPL-MCNC: 623 MG/DL (ref 65–99)
GLUCOSE SERPL-MCNC: 644 MG/DL (ref 65–99)
GLUCOSE SERPL-MCNC: 669 MG/DL (ref 65–99)
GLUCOSE SERPL-MCNC: 700 MG/DL (ref 65–99)
GLUCOSE SERPL-MCNC: 704 MG/DL (ref 65–99)
GLUCOSE SERPL-MCNC: 756 MG/DL (ref 65–99)
GLUCOSE SERPL-MCNC: 768 MG/DL (ref 65–99)
GLUCOSE SERPL-MCNC: 848 MG/DL (ref 65–99)
GLUCOSE SERPL-MCNC: 862 MG/DL (ref 65–99)
GLUCOSE SERPL-MCNC: 914 MG/DL (ref 65–99)
GLUCOSE SERPL-MCNC: 918 MG/DL (ref 65–99)
GLUCOSE SERPL-MCNC: 945 MG/DL (ref 65–99)
GLUCOSE SERPL-MCNC: 981 MG/DL (ref 65–99)
GLUCOSE SERPL-MCNC: 993 MG/DL (ref 65–99)
GLUCOSE UR STRIP.AUTO-MCNC: >=1000 MG/DL
HBA1C MFR BLD: 17.5 % (ref 0–5.6)
HCG SERPL QL: NEGATIVE
HCO3 BLDA-SCNC: 16.6 MMOL/L (ref 17–25)
HCO3 BLDA-SCNC: 18.4 MMOL/L (ref 17–25)
HCO3 BLDV-SCNC: 13 MMOL/L (ref 24–28)
HCT VFR BLD AUTO: 30.8 % (ref 37–47)
HCT VFR BLD AUTO: 32.1 % (ref 37–47)
HCT VFR BLD AUTO: 32.5 % (ref 37–47)
HCT VFR BLD AUTO: 51.5 % (ref 37–47)
HDLC SERPL-MCNC: 19 MG/DL
HGB BLD-MCNC: 10 G/DL (ref 12–16)
HGB BLD-MCNC: 10.3 G/DL (ref 12–16)
HGB BLD-MCNC: 10.6 G/DL (ref 12–16)
HGB BLD-MCNC: 13.5 G/DL (ref 12–16)
HOROWITZ INDEX BLDA+IHG-RTO: 157 MM[HG]
HOROWITZ INDEX BLDA+IHG-RTO: 211 MM[HG]
HYALINE CASTS #/AREA URNS LPF: ABNORMAL /LPF
HYPOCHROMIA BLD QL SMEAR: ABNORMAL
IMM GRANULOCYTES # BLD AUTO: 0.08 K/UL (ref 0–0.11)
IMM GRANULOCYTES NFR BLD AUTO: 0.8 % (ref 0–0.9)
INR PPP: 1.24 (ref 0.87–1.13)
INST. QUALIFIED PATIENT IIQPT: YES
INST. QUALIFIED PATIENT IIQPT: YES
KETONES UR STRIP.AUTO-MCNC: 15 MG/DL
LACTATE BLD-SCNC: 3.1 MMOL/L (ref 0.5–2)
LACTATE BLD-SCNC: 3.3 MMOL/L (ref 0.5–2)
LACTATE BLD-SCNC: 3.5 MMOL/L (ref 0.5–2)
LACTATE BLD-SCNC: 3.5 MMOL/L (ref 0.5–2)
LACTATE BLD-SCNC: 3.6 MMOL/L (ref 0.5–2)
LACTATE BLD-SCNC: 3.6 MMOL/L (ref 0.5–2)
LACTATE BLD-SCNC: 4.8 MMOL/L (ref 0.5–2)
LDLC SERPL CALC-MCNC: ABNORMAL MG/DL
LEUKOCYTE ESTERASE UR QL STRIP.AUTO: ABNORMAL
LIPASE SERPL-CCNC: 586 U/L (ref 11–82)
LYMPHOCYTES # BLD AUTO: 0.58 K/UL (ref 1–4.8)
LYMPHOCYTES # BLD AUTO: 1.4 K/UL (ref 1–4.8)
LYMPHOCYTES # BLD AUTO: 1.72 K/UL (ref 1–4.8)
LYMPHOCYTES NFR BLD: 11.3 % (ref 22–41)
LYMPHOCYTES NFR BLD: 11.9 % (ref 22–41)
LYMPHOCYTES NFR BLD: 16.4 % (ref 22–41)
MAGNESIUM SERPL-MCNC: 1.5 MG/DL (ref 1.5–2.5)
MAGNESIUM SERPL-MCNC: 1.9 MG/DL (ref 1.5–2.5)
MAGNESIUM SERPL-MCNC: 1.9 MG/DL (ref 1.5–2.5)
MAGNESIUM SERPL-MCNC: 2 MG/DL (ref 1.5–2.5)
MAGNESIUM SERPL-MCNC: 2.3 MG/DL (ref 1.5–2.5)
MAGNESIUM SERPL-MCNC: 2.6 MG/DL (ref 1.5–2.5)
MANUAL DIFF BLD: ABNORMAL
MANUAL DIFF BLD: NORMAL
MCH RBC QN AUTO: 25.8 PG (ref 27–33)
MCH RBC QN AUTO: 25.8 PG (ref 27–33)
MCH RBC QN AUTO: 25.9 PG (ref 27–33)
MCH RBC QN AUTO: 25.9 PG (ref 27–33)
MCHC RBC AUTO-ENTMCNC: 26.2 G/DL (ref 33.6–35)
MCHC RBC AUTO-ENTMCNC: 32.1 G/DL (ref 33.6–35)
MCHC RBC AUTO-ENTMCNC: 32.5 G/DL (ref 33.6–35)
MCHC RBC AUTO-ENTMCNC: 32.6 G/DL (ref 33.6–35)
MCV RBC AUTO: 79.3 FL (ref 81.4–97.8)
MCV RBC AUTO: 79.8 FL (ref 81.4–97.8)
MCV RBC AUTO: 80.5 FL (ref 81.4–97.8)
MCV RBC AUTO: 98.5 FL (ref 81.4–97.8)
METAMYELOCYTES NFR BLD MANUAL: 2.6 %
METHADONE UR QL SCN: NEGATIVE
MICRO URNS: ABNORMAL
MICROCYTES BLD QL SMEAR: ABNORMAL
MICROCYTES BLD QL SMEAR: ABNORMAL
MONOCYTES # BLD AUTO: 0.09 K/UL (ref 0–0.85)
MONOCYTES # BLD AUTO: 0.43 K/UL (ref 0–0.85)
MONOCYTES # BLD AUTO: 0.94 K/UL (ref 0–0.85)
MONOCYTES NFR BLD AUTO: 1.8 % (ref 0–13.4)
MONOCYTES NFR BLD AUTO: 3.5 % (ref 0–13.4)
MONOCYTES NFR BLD AUTO: 8.9 % (ref 0–13.4)
MORPHOLOGY BLD-IMP: NORMAL
MYELOCYTES NFR BLD MANUAL: 0.9 %
NEUTROPHILS # BLD AUTO: 10.24 K/UL (ref 2–7.15)
NEUTROPHILS # BLD AUTO: 4.1 K/UL (ref 2–7.15)
NEUTROPHILS # BLD AUTO: 7.72 K/UL (ref 2–7.15)
NEUTROPHILS NFR BLD: 71.8 % (ref 44–72)
NEUTROPHILS NFR BLD: 73 % (ref 44–72)
NEUTROPHILS NFR BLD: 73.4 % (ref 44–72)
NEUTS BAND NFR BLD MANUAL: 11.8 % (ref 0–10)
NEUTS BAND NFR BLD MANUAL: 9.6 % (ref 0–10)
NITRITE UR QL STRIP.AUTO: NEGATIVE
NRBC # BLD AUTO: 0 K/UL
NRBC # BLD AUTO: 0 K/UL
NRBC # BLD AUTO: 0.03 K/UL
NRBC BLD-RTO: 0 /100 WBC
NRBC BLD-RTO: 0 /100 WBC
NRBC BLD-RTO: 0.2 /100 WBC
O2/TOTAL GAS SETTING VFR VENT: 28 %
O2/TOTAL GAS SETTING VFR VENT: 70 %
OPIATES UR QL SCN: NEGATIVE
OSMOLALITY SERPL: 425 MOSM/KG H2O (ref 278–298)
OXYCODONE UR QL SCN: NEGATIVE
PCO2 BLDA: 31.1 MMHG (ref 26–37)
PCO2 BLDA: 34.7 MMHG (ref 26–37)
PCO2 BLDV: 37 MMHG (ref 41–51)
PCO2 TEMP ADJ BLDA: 32.8 MMHG (ref 26–37)
PCO2 TEMP ADJ BLDA: 37.7 MMHG (ref 26–37)
PCP UR QL SCN: NEGATIVE
PH BLDA: 7.29 [PH] (ref 7.4–7.5)
PH BLDA: 7.38 [PH] (ref 7.4–7.5)
PH BLDV: 7.15 [PH] (ref 7.31–7.45)
PH TEMP ADJ BLDA: 7.26 [PH] (ref 7.4–7.5)
PH TEMP ADJ BLDA: 7.36 [PH] (ref 7.4–7.5)
PH UR STRIP.AUTO: 5 [PH]
PHOSPHATE SERPL-MCNC: 2 MG/DL (ref 2.5–4.5)
PHOSPHATE SERPL-MCNC: 3.4 MG/DL (ref 2.5–4.5)
PHOSPHATE SERPL-MCNC: 3.9 MG/DL (ref 2.5–4.5)
PHOSPHATE SERPL-MCNC: 4.1 MG/DL (ref 2.5–4.5)
PHOSPHATE SERPL-MCNC: 6.9 MG/DL (ref 2.5–4.5)
PLATELET # BLD AUTO: 128 K/UL (ref 164–446)
PLATELET # BLD AUTO: 206 K/UL (ref 164–446)
PLATELET # BLD AUTO: 214 K/UL (ref 164–446)
PLATELET # BLD AUTO: 331 K/UL (ref 164–446)
PLATELET BLD QL SMEAR: NORMAL
PMV BLD AUTO: 12.1 FL (ref 9–12.9)
PMV BLD AUTO: 12.3 FL (ref 9–12.9)
PMV BLD AUTO: 12.8 FL (ref 9–12.9)
PMV BLD AUTO: 12.9 FL (ref 9–12.9)
PO2 BLDA: 110 MMHG (ref 64–87)
PO2 BLDA: 59 MMHG (ref 64–87)
PO2 BLDV: 33.6 MMHG (ref 25–40)
PO2 TEMP ADJ BLDA: 122 MMHG (ref 64–87)
PO2 TEMP ADJ BLDA: 64 MMHG (ref 64–87)
POTASSIUM SERPL-SCNC: 3.7 MMOL/L (ref 3.6–5.5)
POTASSIUM SERPL-SCNC: 3.8 MMOL/L (ref 3.6–5.5)
POTASSIUM SERPL-SCNC: 3.9 MMOL/L (ref 3.6–5.5)
POTASSIUM SERPL-SCNC: 4 MMOL/L (ref 3.6–5.5)
POTASSIUM SERPL-SCNC: 4.2 MMOL/L (ref 3.6–5.5)
POTASSIUM SERPL-SCNC: 4.4 MMOL/L (ref 3.6–5.5)
POTASSIUM SERPL-SCNC: 4.7 MMOL/L (ref 3.6–5.5)
POTASSIUM SERPL-SCNC: 4.7 MMOL/L (ref 3.6–5.5)
POTASSIUM SERPL-SCNC: 4.8 MMOL/L (ref 3.6–5.5)
POTASSIUM SERPL-SCNC: 4.8 MMOL/L (ref 3.6–5.5)
POTASSIUM SERPL-SCNC: 5.1 MMOL/L (ref 3.6–5.5)
PROPOXYPH UR QL SCN: NEGATIVE
PROT SERPL-MCNC: 4.5 G/DL (ref 6–8.2)
PROT SERPL-MCNC: 4.9 G/DL (ref 6–8.2)
PROT SERPL-MCNC: 5.3 G/DL (ref 6–8.2)
PROT SERPL-MCNC: 5.8 G/DL (ref 6–8.2)
PROT SERPL-MCNC: 7.1 G/DL (ref 6–8.2)
PROT UR QL STRIP: 30 MG/DL
PROTHROMBIN TIME: 15.7 SEC (ref 12–14.6)
RBC # BLD AUTO: 3.86 M/UL (ref 4.2–5.4)
RBC # BLD AUTO: 3.99 M/UL (ref 4.2–5.4)
RBC # BLD AUTO: 4.1 M/UL (ref 4.2–5.4)
RBC # BLD AUTO: 5.23 M/UL (ref 4.2–5.4)
RBC # URNS HPF: ABNORMAL /HPF
RBC BLD AUTO: PRESENT
RBC BLD AUTO: PRESENT
RBC UR QL AUTO: ABNORMAL
RH BLD: NORMAL
RH BLD: NORMAL
SALICYLATES SERPL-MCNC: 0 MG/DL (ref 15–25)
SAO2 % BLDA: 90 % (ref 93–99)
SAO2 % BLDA: 98 % (ref 93–99)
SAO2 % BLDV: 46.4 %
SODIUM SERPL-SCNC: 126 MMOL/L (ref 135–145)
SODIUM SERPL-SCNC: 128 MMOL/L (ref 135–145)
SODIUM SERPL-SCNC: 136 MMOL/L (ref 135–145)
SODIUM SERPL-SCNC: 139 MMOL/L (ref 135–145)
SODIUM SERPL-SCNC: 140 MMOL/L (ref 135–145)
SODIUM SERPL-SCNC: 142 MMOL/L (ref 135–145)
SODIUM SERPL-SCNC: 143 MMOL/L (ref 135–145)
SODIUM SERPL-SCNC: 144 MMOL/L (ref 135–145)
SODIUM SERPL-SCNC: 145 MMOL/L (ref 135–145)
SODIUM SERPL-SCNC: 145 MMOL/L (ref 135–145)
SP GR UR STRIP.AUTO: 1.02
SPECIMEN DRAWN FROM PATIENT: ABNORMAL
SPECIMEN DRAWN FROM PATIENT: ABNORMAL
TRIGL SERPL-MCNC: 176 MG/DL (ref 0–149)
TROPONIN I SERPL-MCNC: 0.04 NG/ML (ref 0–0.04)
TSH SERPL DL<=0.005 MIU/L-ACNC: 0.32 UIU/ML (ref 0.38–5.33)
UROBILINOGEN UR STRIP.AUTO-MCNC: 0.2 MG/DL
WBC # BLD AUTO: 10.5 K/UL (ref 4.8–10.8)
WBC # BLD AUTO: 12.4 K/UL (ref 4.8–10.8)
WBC # BLD AUTO: 4.7 K/UL (ref 4.8–10.8)
WBC # BLD AUTO: 4.9 K/UL (ref 4.8–10.8)
WBC #/AREA URNS HPF: ABNORMAL /HPF
YEAST BUDDING URNS QL: PRESENT /HPF

## 2019-01-01 PROCEDURE — 83930 ASSAY OF BLOOD OSMOLALITY: CPT

## 2019-01-01 PROCEDURE — 83605 ASSAY OF LACTIC ACID: CPT | Mod: 91

## 2019-01-01 PROCEDURE — 99291 CRITICAL CARE FIRST HOUR: CPT | Performed by: INTERNAL MEDICINE

## 2019-01-01 PROCEDURE — 5A1945Z RESPIRATORY VENTILATION, 24-96 CONSECUTIVE HOURS: ICD-10-PCS | Performed by: INTERNAL MEDICINE

## 2019-01-01 PROCEDURE — 700111 HCHG RX REV CODE 636 W/ 250 OVERRIDE (IP): Performed by: INTERNAL MEDICINE

## 2019-01-01 PROCEDURE — 85007 BL SMEAR W/DIFF WBC COUNT: CPT

## 2019-01-01 PROCEDURE — 700101 HCHG RX REV CODE 250: Performed by: INTERNAL MEDICINE

## 2019-01-01 PROCEDURE — 82962 GLUCOSE BLOOD TEST: CPT | Mod: 91

## 2019-01-01 PROCEDURE — 84100 ASSAY OF PHOSPHORUS: CPT

## 2019-01-01 PROCEDURE — 94003 VENT MGMT INPAT SUBQ DAY: CPT

## 2019-01-01 PROCEDURE — 36556 INSERT NON-TUNNEL CV CATH: CPT

## 2019-01-01 PROCEDURE — 501838 HCHG SUTURE GENERAL: Performed by: SURGERY

## 2019-01-01 PROCEDURE — 82947 ASSAY GLUCOSE BLOOD QUANT: CPT | Mod: 91

## 2019-01-01 PROCEDURE — 700111 HCHG RX REV CODE 636 W/ 250 OVERRIDE (IP)

## 2019-01-01 PROCEDURE — 80048 BASIC METABOLIC PNL TOTAL CA: CPT

## 2019-01-01 PROCEDURE — 85027 COMPLETE CBC AUTOMATED: CPT

## 2019-01-01 PROCEDURE — 160029 HCHG SURGERY MINUTES - 1ST 30 MINS LEVEL 4: Performed by: SURGERY

## 2019-01-01 PROCEDURE — 80061 LIPID PANEL: CPT

## 2019-01-01 PROCEDURE — 99292 CRITICAL CARE ADDL 30 MIN: CPT | Mod: 25 | Performed by: INTERNAL MEDICINE

## 2019-01-01 PROCEDURE — 700111 HCHG RX REV CODE 636 W/ 250 OVERRIDE (IP): Performed by: STUDENT IN AN ORGANIZED HEALTH CARE EDUCATION/TRAINING PROGRAM

## 2019-01-01 PROCEDURE — 36415 COLL VENOUS BLD VENIPUNCTURE: CPT

## 2019-01-01 PROCEDURE — 93005 ELECTROCARDIOGRAM TRACING: CPT | Performed by: EMERGENCY MEDICINE

## 2019-01-01 PROCEDURE — 700105 HCHG RX REV CODE 258: Performed by: INTERNAL MEDICINE

## 2019-01-01 PROCEDURE — 86900 BLOOD TYPING SEROLOGIC ABO: CPT

## 2019-01-01 PROCEDURE — 302154 K THERMIA BLANKET 24X60: Performed by: INTERNAL MEDICINE

## 2019-01-01 PROCEDURE — 700102 HCHG RX REV CODE 250 W/ 637 OVERRIDE(OP): Performed by: INTERNAL MEDICINE

## 2019-01-01 PROCEDURE — 99233 SBSQ HOSP IP/OBS HIGH 50: CPT | Performed by: INTERNAL MEDICINE

## 2019-01-01 PROCEDURE — 501445 HCHG STAPLER, SKIN DISP: Performed by: SURGERY

## 2019-01-01 PROCEDURE — 82962 GLUCOSE BLOOD TEST: CPT

## 2019-01-01 PROCEDURE — 83735 ASSAY OF MAGNESIUM: CPT

## 2019-01-01 PROCEDURE — 71045 X-RAY EXAM CHEST 1 VIEW: CPT

## 2019-01-01 PROCEDURE — 700105 HCHG RX REV CODE 258: Performed by: STUDENT IN AN ORGANIZED HEALTH CARE EDUCATION/TRAINING PROGRAM

## 2019-01-01 PROCEDURE — B548ZZA ULTRASONOGRAPHY OF SUPERIOR VENA CAVA, GUIDANCE: ICD-10-PCS | Performed by: INTERNAL MEDICINE

## 2019-01-01 PROCEDURE — 84443 ASSAY THYROID STIM HORMONE: CPT

## 2019-01-01 PROCEDURE — 84703 CHORIONIC GONADOTROPIN ASSAY: CPT

## 2019-01-01 PROCEDURE — 160009 HCHG ANES TIME/MIN: Performed by: SURGERY

## 2019-01-01 PROCEDURE — 93005 ELECTROCARDIOGRAM TRACING: CPT

## 2019-01-01 PROCEDURE — 80307 DRUG TEST PRSMV CHEM ANLYZR: CPT

## 2019-01-01 PROCEDURE — 82803 BLOOD GASES ANY COMBINATION: CPT

## 2019-01-01 PROCEDURE — 82533 TOTAL CORTISOL: CPT

## 2019-01-01 PROCEDURE — 700111 HCHG RX REV CODE 636 W/ 250 OVERRIDE (IP): Performed by: EMERGENCY MEDICINE

## 2019-01-01 PROCEDURE — 99292 CRITICAL CARE ADDL 30 MIN: CPT | Performed by: INTERNAL MEDICINE

## 2019-01-01 PROCEDURE — 70450 CT HEAD/BRAIN W/O DYE: CPT

## 2019-01-01 PROCEDURE — 82947 ASSAY GLUCOSE BLOOD QUANT: CPT

## 2019-01-01 PROCEDURE — 160041 HCHG SURGERY MINUTES - EA ADDL 1 MIN LEVEL 4: Performed by: SURGERY

## 2019-01-01 PROCEDURE — 85610 PROTHROMBIN TIME: CPT

## 2019-01-01 PROCEDURE — 700111 HCHG RX REV CODE 636 W/ 250 OVERRIDE (IP): Mod: JG | Performed by: INTERNAL MEDICINE

## 2019-01-01 PROCEDURE — 700102 HCHG RX REV CODE 250 W/ 637 OVERRIDE(OP): Performed by: STUDENT IN AN ORGANIZED HEALTH CARE EDUCATION/TRAINING PROGRAM

## 2019-01-01 PROCEDURE — 96365 THER/PROPH/DIAG IV INF INIT: CPT

## 2019-01-01 PROCEDURE — A4314 CATH W/DRAINAGE 2-WAY LATEX: HCPCS | Performed by: INTERNAL MEDICINE

## 2019-01-01 PROCEDURE — 87086 URINE CULTURE/COLONY COUNT: CPT

## 2019-01-01 PROCEDURE — P9047 ALBUMIN (HUMAN), 25%, 50ML: HCPCS | Mod: JG | Performed by: INTERNAL MEDICINE

## 2019-01-01 PROCEDURE — 770022 HCHG ROOM/CARE - ICU (200)

## 2019-01-01 PROCEDURE — 02HV33Z INSERTION OF INFUSION DEVICE INTO SUPERIOR VENA CAVA, PERCUTANEOUS APPROACH: ICD-10-PCS | Performed by: INTERNAL MEDICINE

## 2019-01-01 PROCEDURE — 87040 BLOOD CULTURE FOR BACTERIA: CPT

## 2019-01-01 PROCEDURE — 84484 ASSAY OF TROPONIN QUANT: CPT

## 2019-01-01 PROCEDURE — 0W9G0ZZ DRAINAGE OF PERITONEAL CAVITY, OPEN APPROACH: ICD-10-PCS | Performed by: SURGERY

## 2019-01-01 PROCEDURE — 84132 ASSAY OF SERUM POTASSIUM: CPT

## 2019-01-01 PROCEDURE — 86901 BLOOD TYPING SEROLOGIC RH(D): CPT

## 2019-01-01 PROCEDURE — 160048 HCHG OR STATISTICAL LEVEL 1-5: Performed by: SURGERY

## 2019-01-01 PROCEDURE — 96361 HYDRATE IV INFUSION ADD-ON: CPT

## 2019-01-01 PROCEDURE — 82010 KETONE BODYS QUAN: CPT

## 2019-01-01 PROCEDURE — 36556 INSERT NON-TUNNEL CV CATH: CPT | Mod: RT | Performed by: INTERNAL MEDICINE

## 2019-01-01 PROCEDURE — 99291 CRITICAL CARE FIRST HOUR: CPT

## 2019-01-01 PROCEDURE — 87106 FUNGI IDENTIFICATION YEAST: CPT

## 2019-01-01 PROCEDURE — 700105 HCHG RX REV CODE 258: Performed by: EMERGENCY MEDICINE

## 2019-01-01 PROCEDURE — 82803 BLOOD GASES ANY COMBINATION: CPT | Mod: 91

## 2019-01-01 PROCEDURE — 302132 K THERMIA MOTOR: Performed by: INTERNAL MEDICINE

## 2019-01-01 PROCEDURE — 0WJP0ZZ INSPECTION OF GASTROINTESTINAL TRACT, OPEN APPROACH: ICD-10-PCS | Performed by: SURGERY

## 2019-01-01 PROCEDURE — 80053 COMPREHEN METABOLIC PANEL: CPT

## 2019-01-01 PROCEDURE — 700101 HCHG RX REV CODE 250

## 2019-01-01 PROCEDURE — 85730 THROMBOPLASTIN TIME PARTIAL: CPT

## 2019-01-01 PROCEDURE — 85025 COMPLETE CBC W/AUTO DIFF WBC: CPT

## 2019-01-01 PROCEDURE — 700102 HCHG RX REV CODE 250 W/ 637 OVERRIDE(OP): Performed by: EMERGENCY MEDICINE

## 2019-01-01 PROCEDURE — A9270 NON-COVERED ITEM OR SERVICE: HCPCS | Performed by: INTERNAL MEDICINE

## 2019-01-01 PROCEDURE — 82550 ASSAY OF CK (CPK): CPT

## 2019-01-01 PROCEDURE — 36600 WITHDRAWAL OF ARTERIAL BLOOD: CPT

## 2019-01-01 PROCEDURE — 83036 HEMOGLOBIN GLYCOSYLATED A1C: CPT

## 2019-01-01 PROCEDURE — 82140 ASSAY OF AMMONIA: CPT

## 2019-01-01 PROCEDURE — 96367 TX/PROPH/DG ADDL SEQ IV INF: CPT

## 2019-01-01 PROCEDURE — 81001 URINALYSIS AUTO W/SCOPE: CPT

## 2019-01-01 PROCEDURE — 96366 THER/PROPH/DIAG IV INF ADDON: CPT

## 2019-01-01 PROCEDURE — 03HY32Z INSERTION OF MONITORING DEVICE INTO UPPER ARTERY, PERCUTANEOUS APPROACH: ICD-10-PCS | Performed by: ANESTHESIOLOGY

## 2019-01-01 PROCEDURE — 74176 CT ABD & PELVIS W/O CONTRAST: CPT

## 2019-01-01 PROCEDURE — 94002 VENT MGMT INPAT INIT DAY: CPT

## 2019-01-01 PROCEDURE — 86850 RBC ANTIBODY SCREEN: CPT

## 2019-01-01 PROCEDURE — 80053 COMPREHEN METABOLIC PANEL: CPT | Mod: 91

## 2019-01-01 PROCEDURE — 83690 ASSAY OF LIPASE: CPT

## 2019-01-01 PROCEDURE — C1751 CATH, INF, PER/CENT/MIDLINE: HCPCS

## 2019-01-01 RX ORDER — LORAZEPAM 2 MG/ML
1-5 CONCENTRATE ORAL
Status: DISCONTINUED | OUTPATIENT
Start: 2019-01-01 | End: 2019-01-01 | Stop reason: HOSPADM

## 2019-01-01 RX ORDER — MAGNESIUM SULFATE HEPTAHYDRATE 40 MG/ML
2 INJECTION, SOLUTION INTRAVENOUS
Status: COMPLETED | OUTPATIENT
Start: 2019-01-01 | End: 2019-01-01

## 2019-01-01 RX ORDER — POTASSIUM CHLORIDE 14.9 MG/ML
20 INJECTION INTRAVENOUS ONCE
Status: COMPLETED | OUTPATIENT
Start: 2019-01-01 | End: 2019-01-01

## 2019-01-01 RX ORDER — CYCLOBENZAPRINE HCL 10 MG
10 TABLET ORAL 3 TIMES DAILY PRN
COMMUNITY

## 2019-01-01 RX ORDER — POLYETHYLENE GLYCOL 3350 17 G/17G
1 POWDER, FOR SOLUTION ORAL
Status: DISCONTINUED | OUTPATIENT
Start: 2019-01-01 | End: 2019-01-01

## 2019-01-01 RX ORDER — HEPARIN SODIUM 5000 [USP'U]/ML
5000 INJECTION, SOLUTION INTRAVENOUS; SUBCUTANEOUS EVERY 8 HOURS
Status: DISCONTINUED | OUTPATIENT
Start: 2019-01-01 | End: 2019-01-01

## 2019-01-01 RX ORDER — POTASSIUM CHLORIDE 7.45 MG/ML
10 INJECTION INTRAVENOUS ONCE
Status: COMPLETED | OUTPATIENT
Start: 2019-01-01 | End: 2019-01-01

## 2019-01-01 RX ORDER — ONDANSETRON 4 MG/1
8 TABLET, ORALLY DISINTEGRATING ORAL EVERY 8 HOURS PRN
Status: DISCONTINUED | OUTPATIENT
Start: 2019-01-01 | End: 2019-01-01 | Stop reason: HOSPADM

## 2019-01-01 RX ORDER — SODIUM CHLORIDE 9 MG/ML
2000 INJECTION, SOLUTION INTRAVENOUS ONCE
Status: DISCONTINUED | OUTPATIENT
Start: 2019-01-01 | End: 2019-01-01

## 2019-01-01 RX ORDER — SODIUM CHLORIDE, SODIUM LACTATE, POTASSIUM CHLORIDE, AND CALCIUM CHLORIDE .6; .31; .03; .02 G/100ML; G/100ML; G/100ML; G/100ML
2000 INJECTION, SOLUTION INTRAVENOUS ONCE
Status: DISCONTINUED | OUTPATIENT
Start: 2019-01-01 | End: 2019-01-01

## 2019-01-01 RX ORDER — ACETAMINOPHEN 650 MG/1
650 SUPPOSITORY RECTAL EVERY 6 HOURS PRN
Status: DISCONTINUED | OUTPATIENT
Start: 2019-01-01 | End: 2019-01-01

## 2019-01-01 RX ORDER — INSULIN GLARGINE 100 [IU]/ML
80 INJECTION, SOLUTION SUBCUTANEOUS EVERY EVENING
COMMUNITY

## 2019-01-01 RX ORDER — MAGNESIUM SULFATE HEPTAHYDRATE 40 MG/ML
4 INJECTION, SOLUTION INTRAVENOUS
Status: COMPLETED | OUTPATIENT
Start: 2019-01-01 | End: 2019-01-01

## 2019-01-01 RX ORDER — SODIUM CHLORIDE 9 MG/ML
1000 INJECTION, SOLUTION INTRAVENOUS ONCE
Status: COMPLETED | OUTPATIENT
Start: 2019-01-01 | End: 2019-01-01

## 2019-01-01 RX ORDER — SODIUM CHLORIDE, SODIUM LACTATE, POTASSIUM CHLORIDE, CALCIUM CHLORIDE 600; 310; 30; 20 MG/100ML; MG/100ML; MG/100ML; MG/100ML
INJECTION, SOLUTION INTRAVENOUS CONTINUOUS
Status: DISCONTINUED | OUTPATIENT
Start: 2019-01-01 | End: 2019-01-01

## 2019-01-01 RX ORDER — NOREPINEPHRINE BITARTRATE 1 MG/ML
INJECTION, SOLUTION INTRAVENOUS
Status: COMPLETED
Start: 2019-01-01 | End: 2019-01-01

## 2019-01-01 RX ORDER — SODIUM CHLORIDE, SODIUM LACTATE, POTASSIUM CHLORIDE, CALCIUM CHLORIDE 600; 310; 30; 20 MG/100ML; MG/100ML; MG/100ML; MG/100ML
1000 INJECTION, SOLUTION INTRAVENOUS ONCE
Status: COMPLETED | OUTPATIENT
Start: 2019-01-01 | End: 2019-01-01

## 2019-01-01 RX ORDER — DULOXETIN HYDROCHLORIDE 30 MG/1
30 CAPSULE, DELAYED RELEASE ORAL 2 TIMES DAILY
COMMUNITY

## 2019-01-01 RX ORDER — LORAZEPAM 2 MG/ML
1 CONCENTRATE ORAL
Status: DISCONTINUED | OUTPATIENT
Start: 2019-01-01 | End: 2019-01-01

## 2019-01-01 RX ORDER — DEXTROSE AND SODIUM CHLORIDE 10; .45 G/100ML; G/100ML
INJECTION, SOLUTION INTRAVENOUS CONTINUOUS
Status: DISCONTINUED | OUTPATIENT
Start: 2019-01-01 | End: 2019-01-01

## 2019-01-01 RX ORDER — PIOGLITAZONEHYDROCHLORIDE 30 MG/1
30 TABLET ORAL DAILY
Refills: 3 | COMMUNITY
Start: 2018-01-01

## 2019-01-01 RX ORDER — ONDANSETRON 2 MG/ML
8 INJECTION INTRAMUSCULAR; INTRAVENOUS EVERY 8 HOURS PRN
Status: DISCONTINUED | OUTPATIENT
Start: 2019-01-01 | End: 2019-01-01 | Stop reason: HOSPADM

## 2019-01-01 RX ORDER — ATROPINE SULFATE 10 MG/ML
2 SOLUTION/ DROPS OPHTHALMIC EVERY 4 HOURS PRN
Status: DISCONTINUED | OUTPATIENT
Start: 2019-01-01 | End: 2019-01-01 | Stop reason: HOSPADM

## 2019-01-01 RX ORDER — SODIUM CHLORIDE, SODIUM LACTATE, POTASSIUM CHLORIDE, AND CALCIUM CHLORIDE .6; .31; .03; .02 G/100ML; G/100ML; G/100ML; G/100ML
1000 INJECTION, SOLUTION INTRAVENOUS ONCE
Status: COMPLETED | OUTPATIENT
Start: 2019-01-01 | End: 2019-01-01

## 2019-01-01 RX ORDER — LISINOPRIL 10 MG/1
10 TABLET ORAL DAILY
Refills: 3 | COMMUNITY
Start: 2018-01-01

## 2019-01-01 RX ORDER — LORAZEPAM 2 MG/ML
1 INJECTION INTRAMUSCULAR
Status: DISCONTINUED | OUTPATIENT
Start: 2019-01-01 | End: 2019-01-01

## 2019-01-01 RX ORDER — DEXTROSE MONOHYDRATE 25 G/50ML
12.5-25 INJECTION, SOLUTION INTRAVENOUS PRN
Status: DISCONTINUED | OUTPATIENT
Start: 2019-01-01 | End: 2019-01-01

## 2019-01-01 RX ORDER — AMOXICILLIN 250 MG
2 CAPSULE ORAL 2 TIMES DAILY
Status: DISCONTINUED | OUTPATIENT
Start: 2019-01-01 | End: 2019-01-01

## 2019-01-01 RX ORDER — ATORVASTATIN CALCIUM 40 MG/1
40 TABLET, FILM COATED ORAL DAILY
Refills: 3 | COMMUNITY
Start: 2018-01-01

## 2019-01-01 RX ORDER — BISACODYL 10 MG
10 SUPPOSITORY, RECTAL RECTAL
Status: DISCONTINUED | OUTPATIENT
Start: 2019-01-01 | End: 2019-01-01

## 2019-01-01 RX ORDER — GABAPENTIN 800 MG/1
800 TABLET ORAL DAILY
Refills: 11 | COMMUNITY
Start: 2018-01-01 | End: 2019-01-01 | Stop reason: CLARIF

## 2019-01-01 RX ORDER — SODIUM CHLORIDE 9 MG/ML
500 INJECTION, SOLUTION INTRAVENOUS ONCE
Status: DISCONTINUED | OUTPATIENT
Start: 2019-01-01 | End: 2019-01-01

## 2019-01-01 RX ORDER — POLYVINYL ALCOHOL 14 MG/ML
2 SOLUTION/ DROPS OPHTHALMIC EVERY 6 HOURS PRN
Status: DISCONTINUED | OUTPATIENT
Start: 2019-01-01 | End: 2019-01-01 | Stop reason: HOSPADM

## 2019-01-01 RX ORDER — SODIUM CHLORIDE, SODIUM LACTATE, POTASSIUM CHLORIDE, AND CALCIUM CHLORIDE .6; .31; .03; .02 G/100ML; G/100ML; G/100ML; G/100ML
500 INJECTION, SOLUTION INTRAVENOUS ONCE
Status: COMPLETED | OUTPATIENT
Start: 2019-01-01 | End: 2019-01-01

## 2019-01-01 RX ORDER — SODIUM CHLORIDE, SODIUM LACTATE, POTASSIUM CHLORIDE, CALCIUM CHLORIDE 600; 310; 30; 20 MG/100ML; MG/100ML; MG/100ML; MG/100ML
1000 INJECTION, SOLUTION INTRAVENOUS CONTINUOUS
Status: DISCONTINUED | OUTPATIENT
Start: 2019-01-01 | End: 2019-01-01

## 2019-01-01 RX ORDER — HYDRALAZINE HYDROCHLORIDE 20 MG/ML
10 INJECTION INTRAMUSCULAR; INTRAVENOUS EVERY 4 HOURS PRN
Status: DISCONTINUED | OUTPATIENT
Start: 2019-01-01 | End: 2019-01-01

## 2019-01-01 RX ORDER — PHENYLEPHRINE HCL IN 0.9% NACL 0.5 MG/5ML
100 SYRINGE (ML) INTRAVENOUS
Status: DISCONTINUED | OUTPATIENT
Start: 2019-01-01 | End: 2019-01-01

## 2019-01-01 RX ORDER — DEXTROSE, SODIUM CHLORIDE, SODIUM LACTATE, POTASSIUM CHLORIDE, AND CALCIUM CHLORIDE 5; .6; .31; .03; .02 G/100ML; G/100ML; G/100ML; G/100ML; G/100ML
INJECTION, SOLUTION INTRAVENOUS CONTINUOUS
Status: DISCONTINUED | OUTPATIENT
Start: 2019-01-01 | End: 2019-01-01

## 2019-01-01 RX ORDER — ALBUMIN (HUMAN) 12.5 G/50ML
50 SOLUTION INTRAVENOUS ONCE
Status: COMPLETED | OUTPATIENT
Start: 2019-01-01 | End: 2019-01-01

## 2019-01-01 RX ORDER — HYDROMORPHONE HYDROCHLORIDE 2 MG/ML
4 INJECTION, SOLUTION INTRAMUSCULAR; INTRAVENOUS; SUBCUTANEOUS
Status: DISCONTINUED | OUTPATIENT
Start: 2019-01-01 | End: 2019-01-01

## 2019-01-01 RX ORDER — LORAZEPAM 2 MG/ML
1-5 INJECTION INTRAMUSCULAR
Status: DISCONTINUED | OUTPATIENT
Start: 2019-01-01 | End: 2019-01-01 | Stop reason: HOSPADM

## 2019-01-01 RX ORDER — ONDANSETRON 2 MG/ML
4 INJECTION INTRAMUSCULAR; INTRAVENOUS EVERY 4 HOURS PRN
Status: DISCONTINUED | OUTPATIENT
Start: 2019-01-01 | End: 2019-01-01

## 2019-01-01 RX ADMIN — PHENYLEPHRINE HYDROCHLORIDE 300 MCG/MIN: 10 INJECTION INTRAVENOUS at 13:56

## 2019-01-01 RX ADMIN — NOREPINEPHRINE BITARTRATE 5 MCG/MIN: 1 INJECTION INTRAVENOUS at 17:29

## 2019-01-01 RX ADMIN — SODIUM CHLORIDE, POTASSIUM CHLORIDE, SODIUM LACTATE AND CALCIUM CHLORIDE 1000 ML: 600; 310; 30; 20 INJECTION, SOLUTION INTRAVENOUS at 01:24

## 2019-01-01 RX ADMIN — PHENYLEPHRINE HYDROCHLORIDE 50 MCG/MIN: 10 INJECTION INTRAVENOUS at 18:48

## 2019-01-01 RX ADMIN — VASOPRESSIN 0.03 UNITS/MIN: 20 INJECTION INTRAVENOUS at 02:44

## 2019-01-01 RX ADMIN — SODIUM CHLORIDE 2 UNITS/HR: 9 INJECTION, SOLUTION INTRAVENOUS at 01:45

## 2019-01-01 RX ADMIN — LORAZEPAM 5 MG: 2 INJECTION INTRAMUSCULAR at 15:25

## 2019-01-01 RX ADMIN — PHENYLEPHRINE HYDROCHLORIDE 300 MCG/MIN: 10 INJECTION INTRAVENOUS at 23:24

## 2019-01-01 RX ADMIN — SODIUM CHLORIDE 1000 ML: 9 INJECTION, SOLUTION INTRAVENOUS at 13:44

## 2019-01-01 RX ADMIN — SODIUM CHLORIDE 3 UNITS/HR: 9 INJECTION, SOLUTION INTRAVENOUS at 13:41

## 2019-01-01 RX ADMIN — FENTANYL CITRATE 50 MCG/HR: 50 INJECTION, SOLUTION INTRAMUSCULAR; INTRAVENOUS at 17:03

## 2019-01-01 RX ADMIN — PHENYLEPHRINE HYDROCHLORIDE 300 MCG/MIN: 10 INJECTION INTRAVENOUS at 22:37

## 2019-01-01 RX ADMIN — PHENYLEPHRINE HYDROCHLORIDE 160 MCG/MIN: 10 INJECTION INTRAVENOUS at 03:56

## 2019-01-01 RX ADMIN — CEFTRIAXONE SODIUM 1 G: 1 INJECTION, POWDER, FOR SOLUTION INTRAMUSCULAR; INTRAVENOUS at 06:26

## 2019-01-01 RX ADMIN — SODIUM CHLORIDE 1000 ML: 9 INJECTION, SOLUTION INTRAVENOUS at 16:30

## 2019-01-01 RX ADMIN — SODIUM CHLORIDE 3 UNITS/HR: 9 INJECTION, SOLUTION INTRAVENOUS at 11:00

## 2019-01-01 RX ADMIN — CEFTRIAXONE SODIUM 1 G: 1 INJECTION, POWDER, FOR SOLUTION INTRAMUSCULAR; INTRAVENOUS at 06:18

## 2019-01-01 RX ADMIN — SODIUM CHLORIDE, POTASSIUM CHLORIDE, SODIUM LACTATE AND CALCIUM CHLORIDE: 600; 310; 30; 20 INJECTION, SOLUTION INTRAVENOUS at 09:28

## 2019-01-01 RX ADMIN — PHENYLEPHRINE HYDROCHLORIDE 300 MCG/MIN: 10 INJECTION INTRAVENOUS at 01:43

## 2019-01-01 RX ADMIN — METRONIDAZOLE 500 MG: 500 INJECTION, SOLUTION INTRAVENOUS at 14:23

## 2019-01-01 RX ADMIN — SODIUM CHLORIDE, POTASSIUM CHLORIDE, SODIUM LACTATE AND CALCIUM CHLORIDE 1000 ML: 600; 310; 30; 20 INJECTION, SOLUTION INTRAVENOUS at 12:15

## 2019-01-01 RX ADMIN — POTASSIUM CHLORIDE 10 MEQ: 7.46 INJECTION, SOLUTION INTRAVENOUS at 16:44

## 2019-01-01 RX ADMIN — PHENYLEPHRINE HYDROCHLORIDE 300 MCG/MIN: 10 INJECTION INTRAVENOUS at 11:23

## 2019-01-01 RX ADMIN — FENTANYL CITRATE 100 MCG: 50 INJECTION, SOLUTION INTRAMUSCULAR; INTRAVENOUS at 14:23

## 2019-01-01 RX ADMIN — SODIUM CHLORIDE, POTASSIUM CHLORIDE, SODIUM LACTATE AND CALCIUM CHLORIDE 500 ML: 600; 310; 30; 20 INJECTION, SOLUTION INTRAVENOUS at 23:21

## 2019-01-01 RX ADMIN — PHENYLEPHRINE HYDROCHLORIDE 300 MCG/MIN: 10 INJECTION INTRAVENOUS at 08:15

## 2019-01-01 RX ADMIN — METRONIDAZOLE 500 MG: 500 INJECTION, SOLUTION INTRAVENOUS at 05:01

## 2019-01-01 RX ADMIN — METRONIDAZOLE 500 MG: 500 INJECTION, SOLUTION INTRAVENOUS at 05:51

## 2019-01-01 RX ADMIN — SODIUM CHLORIDE, POTASSIUM CHLORIDE, SODIUM LACTATE AND CALCIUM CHLORIDE 1000 ML: 600; 310; 30; 20 INJECTION, SOLUTION INTRAVENOUS at 21:38

## 2019-01-01 RX ADMIN — POTASSIUM CHLORIDE 10 MEQ: 7.46 INJECTION, SOLUTION INTRAVENOUS at 17:08

## 2019-01-01 RX ADMIN — NOREPINEPHRINE BITARTRATE 14 MCG/MIN: 1 INJECTION INTRAVENOUS at 08:10

## 2019-01-01 RX ADMIN — FENTANYL CITRATE 100 MCG: 50 INJECTION, SOLUTION INTRAMUSCULAR; INTRAVENOUS at 05:30

## 2019-01-01 RX ADMIN — SODIUM CHLORIDE 9 UNITS/HR: 9 INJECTION, SOLUTION INTRAVENOUS at 17:57

## 2019-01-01 RX ADMIN — CEFTRIAXONE SODIUM 1 G: 1 INJECTION, POWDER, FOR SOLUTION INTRAMUSCULAR; INTRAVENOUS at 12:17

## 2019-01-01 RX ADMIN — MAGNESIUM SULFATE HEPTAHYDRATE 2 G: 40 INJECTION, SOLUTION INTRAVENOUS at 20:05

## 2019-01-01 RX ADMIN — PHENYLEPHRINE HYDROCHLORIDE 300 MCG/MIN: 10 INJECTION INTRAVENOUS at 20:44

## 2019-01-01 RX ADMIN — ACETAMINOPHEN 650 MG: 650 SUPPOSITORY RECTAL at 22:55

## 2019-01-01 RX ADMIN — SODIUM CHLORIDE, POTASSIUM CHLORIDE, SODIUM LACTATE AND CALCIUM CHLORIDE: 600; 310; 30; 20 INJECTION, SOLUTION INTRAVENOUS at 20:42

## 2019-01-01 RX ADMIN — PHENYLEPHRINE HYDROCHLORIDE 300 MCG/MIN: 10 INJECTION INTRAVENOUS at 22:55

## 2019-01-01 RX ADMIN — NOREPINEPHRINE BITARTRATE 14 MCG/MIN: 1 INJECTION INTRAVENOUS at 17:57

## 2019-01-01 RX ADMIN — PHENYLEPHRINE HYDROCHLORIDE 300 MCG/MIN: 10 INJECTION INTRAVENOUS at 11:08

## 2019-01-01 RX ADMIN — PHENYLEPHRINE HYDROCHLORIDE 300 MCG/MIN: 10 INJECTION INTRAVENOUS at 04:06

## 2019-01-01 RX ADMIN — SODIUM CHLORIDE, POTASSIUM CHLORIDE, SODIUM LACTATE AND CALCIUM CHLORIDE: 600; 310; 30; 20 INJECTION, SOLUTION INTRAVENOUS at 17:19

## 2019-01-01 RX ADMIN — PHENYLEPHRINE HYDROCHLORIDE 300 MCG/MIN: 10 INJECTION INTRAVENOUS at 06:56

## 2019-01-01 RX ADMIN — FENTANYL CITRATE 400 MCG/HR: 50 INJECTION, SOLUTION INTRAMUSCULAR; INTRAVENOUS at 14:45

## 2019-01-01 RX ADMIN — PHENYLEPHRINE HYDROCHLORIDE 300 MCG/MIN: 10 INJECTION INTRAVENOUS at 16:24

## 2019-01-01 RX ADMIN — NOREPINEPHRINE BITARTRATE 14 MCG/MIN: 1 INJECTION INTRAVENOUS at 03:34

## 2019-01-01 RX ADMIN — FENTANYL CITRATE 100 MCG: 50 INJECTION, SOLUTION INTRAMUSCULAR; INTRAVENOUS at 15:25

## 2019-01-01 RX ADMIN — HEPARIN SODIUM 5000 UNITS: 5000 INJECTION, SOLUTION INTRAVENOUS; SUBCUTANEOUS at 22:57

## 2019-01-01 RX ADMIN — HEPARIN SODIUM 5000 UNITS: 5000 INJECTION, SOLUTION INTRAVENOUS; SUBCUTANEOUS at 05:53

## 2019-01-01 RX ADMIN — PHENYLEPHRINE HYDROCHLORIDE 300 MCG/MIN: 10 INJECTION INTRAVENOUS at 18:36

## 2019-01-01 RX ADMIN — HEPARIN SODIUM 5000 UNITS: 5000 INJECTION, SOLUTION INTRAVENOUS; SUBCUTANEOUS at 22:12

## 2019-01-01 RX ADMIN — SODIUM CHLORIDE, POTASSIUM CHLORIDE, SODIUM LACTATE AND CALCIUM CHLORIDE: 600; 310; 30; 20 INJECTION, SOLUTION INTRAVENOUS at 01:27

## 2019-01-01 RX ADMIN — NOREPINEPHRINE BITARTRATE 14 MCG/MIN: 1 INJECTION INTRAVENOUS at 06:56

## 2019-01-01 RX ADMIN — METRONIDAZOLE 500 MG: 500 INJECTION, SOLUTION INTRAVENOUS at 01:52

## 2019-01-01 RX ADMIN — NOREPINEPHRINE BITARTRATE 8 MG: 1 INJECTION INTRAVENOUS at 17:07

## 2019-01-01 RX ADMIN — ALBUMIN (HUMAN) 50 G: 5 SOLUTION INTRAVENOUS at 18:42

## 2019-01-01 RX ADMIN — SODIUM CHLORIDE, POTASSIUM CHLORIDE, SODIUM LACTATE AND CALCIUM CHLORIDE 1000 ML: 600; 310; 30; 20 INJECTION, SOLUTION INTRAVENOUS at 16:11

## 2019-01-01 RX ADMIN — FENTANYL CITRATE 200 MCG: 50 INJECTION, SOLUTION INTRAMUSCULAR; INTRAVENOUS at 07:49

## 2019-01-01 RX ADMIN — POTASSIUM CHLORIDE 20 MEQ: 14.9 INJECTION, SOLUTION INTRAVENOUS at 20:06

## 2019-01-01 RX ADMIN — PHENYLEPHRINE HYDROCHLORIDE 300 MCG/MIN: 10 INJECTION INTRAVENOUS at 06:27

## 2019-01-01 RX ADMIN — FENTANYL CITRATE 100 MCG: 50 INJECTION, SOLUTION INTRAMUSCULAR; INTRAVENOUS at 10:47

## 2019-01-01 RX ADMIN — SODIUM CHLORIDE, SODIUM LACTATE, POTASSIUM CHLORIDE, CALCIUM CHLORIDE AND DEXTROSE MONOHYDRATE: 5; 600; 310; 30; 20 INJECTION, SOLUTION INTRAVENOUS at 21:32

## 2019-01-01 RX ADMIN — SODIUM CHLORIDE, POTASSIUM CHLORIDE, SODIUM LACTATE AND CALCIUM CHLORIDE: 600; 310; 30; 20 INJECTION, SOLUTION INTRAVENOUS at 21:41

## 2019-01-01 RX ADMIN — FENTANYL CITRATE 50 MCG: 50 INJECTION, SOLUTION INTRAMUSCULAR; INTRAVENOUS at 12:35

## 2019-01-01 RX ADMIN — POTASSIUM CHLORIDE 10 MEQ: 7.46 INJECTION, SOLUTION INTRAVENOUS at 05:52

## 2019-01-01 RX ADMIN — METRONIDAZOLE 500 MG: 500 INJECTION, SOLUTION INTRAVENOUS at 22:12

## 2019-01-01 RX ADMIN — PHENYLEPHRINE HYDROCHLORIDE 300 MCG/MIN: 10 INJECTION INTRAVENOUS at 09:08

## 2019-01-01 RX ADMIN — POTASSIUM CHLORIDE 10 MEQ: 7.46 INJECTION, SOLUTION INTRAVENOUS at 11:17

## 2019-01-01 RX ADMIN — PHENYLEPHRINE HYDROCHLORIDE 300 MCG/MIN: 10 INJECTION INTRAVENOUS at 01:15

## 2019-01-01 RX ADMIN — SODIUM CHLORIDE, POTASSIUM CHLORIDE, SODIUM LACTATE AND CALCIUM CHLORIDE: 600; 310; 30; 20 INJECTION, SOLUTION INTRAVENOUS at 16:24

## 2019-01-01 RX ADMIN — PHENYLEPHRINE HYDROCHLORIDE 300 MCG/MIN: 10 INJECTION INTRAVENOUS at 13:30

## 2019-01-01 RX ADMIN — SODIUM CHLORIDE, POTASSIUM CHLORIDE, SODIUM LACTATE AND CALCIUM CHLORIDE 1000 ML: 600; 310; 30; 20 INJECTION, SOLUTION INTRAVENOUS at 02:28

## 2019-01-01 RX ADMIN — VASOPRESSIN 0.03 UNITS/MIN: 20 INJECTION INTRAVENOUS at 05:54

## 2019-01-01 RX ADMIN — HEPARIN SODIUM 5000 UNITS: 5000 INJECTION, SOLUTION INTRAVENOUS; SUBCUTANEOUS at 14:23

## 2019-01-01 RX ADMIN — VASOPRESSIN 0.03 UNITS/MIN: 20 INJECTION INTRAVENOUS at 16:09

## 2019-01-01 RX ADMIN — SODIUM CHLORIDE, POTASSIUM CHLORIDE, SODIUM LACTATE AND CALCIUM CHLORIDE: 600; 310; 30; 20 INJECTION, SOLUTION INTRAVENOUS at 23:22

## 2019-01-01 RX ADMIN — POTASSIUM CHLORIDE 20 MEQ: 14.9 INJECTION, SOLUTION INTRAVENOUS at 01:52

## 2019-01-01 RX ADMIN — FENTANYL CITRATE 100 MCG: 50 INJECTION, SOLUTION INTRAMUSCULAR; INTRAVENOUS at 16:44

## 2019-01-01 RX ADMIN — SODIUM CHLORIDE, POTASSIUM CHLORIDE, SODIUM LACTATE AND CALCIUM CHLORIDE: 600; 310; 30; 20 INJECTION, SOLUTION INTRAVENOUS at 12:24

## 2019-01-01 ASSESSMENT — COPD QUESTIONNAIRES
COPD SCREENING SCORE: 3
DURING THE PAST 4 WEEKS HOW MUCH DID YOU FEEL SHORT OF BREATH: NONE/LITTLE OF THE TIME
DO YOU EVER COUGH UP ANY MUCUS OR PHLEGM?: NO/ONLY WITH OCCASIONAL COLDS OR INFECTIONS
HAVE YOU SMOKED AT LEAST 100 CIGARETTES IN YOUR ENTIRE LIFE: YES

## 2019-01-01 ASSESSMENT — LIFESTYLE VARIABLES: EVER_SMOKED: YES

## 2019-02-12 PROBLEM — N30.01 ACUTE CYSTITIS WITH HEMATURIA: Status: ACTIVE | Noted: 2019-01-01

## 2019-02-12 PROBLEM — N18.30 ACUTE RENAL FAILURE WITH ACUTE TUBULAR NECROSIS SUPERIMPOSED ON STAGE 3 CHRONIC KIDNEY DISEASE (HCC): Status: ACTIVE | Noted: 2019-01-01

## 2019-02-12 PROBLEM — E87.20 LACTIC ACIDOSIS: Status: ACTIVE | Noted: 2019-01-01

## 2019-02-12 PROBLEM — N17.0 ACUTE RENAL FAILURE WITH ACUTE TUBULAR NECROSIS SUPERIMPOSED ON STAGE 3 CHRONIC KIDNEY DISEASE (HCC): Status: ACTIVE | Noted: 2019-01-01

## 2019-02-12 PROBLEM — I48.0 PAROXYSMAL ATRIAL FIBRILLATION (HCC): Status: ACTIVE | Noted: 2019-01-01

## 2019-02-12 PROBLEM — E87.1 HYPONATREMIA: Status: ACTIVE | Noted: 2019-01-01

## 2019-02-12 NOTE — ED PROVIDER NOTES
ED Provider Note    ER PROVIDER NOTE        CHIEF COMPLAINT  Chief Complaint   Patient presents with   • ALOC     Pt BIB EMS/ pt found down/ALOC for 3 days/ s/o unable to care for pt. BG >600 PTA.    • Hyperglycemia   • Atrial Fibrillation       HPI  Vin Maciel is a 58 y.o. female who presents to the emergency department complaining of after being found down.  She was apparently down for 3 days and found altered and confused she is unable to provide any history.  On EMS arrival, no trauma was noted    No other history is obtainable due to her altered mental status    REVIEW OF SYSTEMS  Pertinent positives include confusion. Pertinent negatives include no trauma.  HPI further is unobtainable due to her altered mental status  PAST MEDICAL HISTORY   has a past medical history of DM (diabetes mellitus) (HCC) and HTN (hypertension).    SURGICAL HISTORY   has a past surgical history that includes other; hysterectomy, total abdominal; cholecystectomy; appendectomy;  DELIVERY SER; other orthopedic surgery; and laminotomy.    FAMILY HISTORY  Family History   Problem Relation Age of Onset   • Diabetes Mother    • Heart Disease Father    • Cancer Father    • Diabetes Sister        SOCIAL HISTORY  Social History     Social History   • Marital status:      Spouse name: N/A   • Number of children: N/A   • Years of education: N/A     Social History Main Topics   • Smoking status: Current Every Day Smoker     Packs/day: 1.00   • Smokeless tobacco: Never Used   • Alcohol use No   • Drug use: No   • Sexual activity: Not on file     Other Topics Concern   • Not on file     Social History Narrative   • No narrative on file      History   Drug Use No       CURRENT MEDICATIONS  Home Medications     Reviewed by Kavon Rehman (Pharmacy Tech) on 19 at 1341  Med List Status: Complete   Medication Last Dose Status   albuterol 108 (90 Base) MCG/ACT Aero Soln inhalation aerosol UNK Active   atorvastatin  (LIPITOR) 40 MG Tab UNK Active   clonazePAM (KLONOPIN) 1 MG Tab UNK Active   cyclobenzaprine (FLEXERIL) 10 MG Tab UNK Active   DULoxetine (CYMBALTA) 30 MG Cap DR Particles UNK Active   Insulin Glargine (BASAGLAR KWIKPEN) 100 UNIT/ML Solution Pen-injector UNK Active   lisinopril (PRINIVIL) 10 MG Tab UNK Active   metformin (GLUCOPHAGE) 500 MG Tab UNK Active   metoprolol SR (TOPROL XL) 50 MG TABLET SR 24 HR UNK Active   omeprazole (PRILOSEC) 20 MG delayed-release capsule UNK Active   pioglitazone (ACTOS) 30 MG Tab UNK Active   rivaroxaban (XARELTO) 20 MG Tab tablet UNK Active   spironolactone (ALDACTONE) 50 MG Tab UNK Active   tramadol (ULTRAM) 50 MG Tab UNK Active   zolpidem (AMBIEN) 5 MG Tab UNK Active                ALLERGIES  Allergies   Allergen Reactions   • Sulfa Drugs Nausea     N/V nervousness       PHYSICAL EXAM  VITAL SIGNS: Pulse (!) 112   Temp 36.5 °C (97.7 °F) (Temporal)   Resp 20   Wt 61 kg (134 lb 7.7 oz)   SpO2 97%   BMI 23.82 kg/m²   Pulse ox interpretation:I interpret this pulse ox as normal.    Constitutional: Alert confused  HENT: No signs of trauma, Bilateral external ears normal, Nose normal.  Mucous membranes markedly dry  Eyes: Pupils are equal and reactive, Conjunctiva normal, Non-icteric.   Neck: Normal range of motion, No tenderness, Supple, No stridor.   Lymphatic: No lymphadenopathy noted.   Cardiovascular: Tachycardic, regular, no murmurs.   Thorax & Lungs: Normal breath sounds, No respiratory distress, No wheezing, No chest tenderness.   Abdomen: Bowel sounds normal, Soft, No tenderness, No masses, No pulsatile masses. No peritoneal signs.  Skin: Warm, Dry, No erythema, No rash.   Back: No bony tenderness, No CVA tenderness.   Musculoskeletal: Good range of motion in all major joints. No tenderness to palpation or major deformities noted.   Neurologic: Alert , confused, moves all 4 extremities but does not follow commands  Psychiatric: Confused  DIAGNOSTIC STUDIES /  PROCEDURES    Results for orders placed or performed during the hospital encounter of 02/12/19   CBC WITH DIFFERENTIAL   Result Value Ref Range    WBC 10.5 4.8 - 10.8 K/uL    RBC 5.23 4.20 - 5.40 M/uL    Hemoglobin 13.5 12.0 - 16.0 g/dL    Hematocrit 51.5 (H) 37.0 - 47.0 %    MCV 98.5 (H) 81.4 - 97.8 fL    MCH 25.8 (L) 27.0 - 33.0 pg    MCHC 26.2 (L) 33.6 - 35.0 g/dL    RDW 51.1 (H) 35.9 - 50.0 fL    Platelet Count 331 164 - 446 K/uL    MPV 12.8 9.0 - 12.9 fL    Neutrophils-Polys 73.40 (H) 44.00 - 72.00 %    Lymphocytes 16.40 (L) 22.00 - 41.00 %    Monocytes 8.90 0.00 - 13.40 %    Eosinophils 0.10 0.00 - 6.90 %    Basophils 0.40 0.00 - 1.80 %    Immature Granulocytes 0.80 0.00 - 0.90 %    Nucleated RBC 0.00 /100 WBC    Neutrophils (Absolute) 7.72 (H) 2.00 - 7.15 K/uL    Lymphs (Absolute) 1.72 1.00 - 4.80 K/uL    Monos (Absolute) 0.94 (H) 0.00 - 0.85 K/uL    Eos (Absolute) 0.01 0.00 - 0.51 K/uL    Baso (Absolute) 0.04 0.00 - 0.12 K/uL    Immature Granulocytes (abs) 0.08 0.00 - 0.11 K/uL    NRBC (Absolute) 0.00 K/uL    Hypochromia 1+     Anisocytosis 1+     Microcytosis 1+    COMP METABOLIC PANEL   Result Value Ref Range    Sodium 126 (L) 135 - 145 mmol/L    Potassium 4.8 3.6 - 5.5 mmol/L    Chloride 72 (L) 96 - 112 mmol/L    Co2 14 (L) 20 - 33 mmol/L    Anion Gap 40.0 (H) 0.0 - 11.9    Glucose 1771 (HH) 65 - 99 mg/dL    Bun 113 (HH) 8 - 22 mg/dL    Creatinine 4.54 (H) 0.50 - 1.40 mg/dL    Calcium 9.1 8.5 - 10.5 mg/dL    AST(SGOT) 14 12 - 45 U/L    ALT(SGPT) 12 2 - 50 U/L    Alkaline Phosphatase 169 (H) 30 - 99 U/L    Total Bilirubin 1.2 0.1 - 1.5 mg/dL    Albumin 3.8 3.2 - 4.9 g/dL    Total Protein 7.1 6.0 - 8.2 g/dL    Globulin 3.3 1.9 - 3.5 g/dL    A-G Ratio 1.2 g/dL   HCG QUAL SERUM   Result Value Ref Range    Beta-Hcg Qualitative Serum Negative Negative   DIAGNOSTIC ALCOHOL   Result Value Ref Range    Diagnostic Alcohol 0.00 0.00 g/dL   URINE DRUG SCREEN (TRIAGE)   Result Value Ref Range    Amphetamines Urine  Negative Negative    Barbiturates Negative Negative    Benzodiazepines Negative Negative    Cocaine Metabolite Negative Negative    Methadone Negative Negative    Opiates Negative Negative    Oxycodone Negative Negative    Phencyclidine -Pcp Negative Negative    Propoxyphene Negative Negative    Cannabinoid Metab Negative Negative   Lactic acid (lactate)   Result Value Ref Range    Lactic Acid 4.8 (HH) 0.5 - 2.0 mmol/L   URINALYSIS   Result Value Ref Range    Color Yellow     Character Turbid (A)     Specific Gravity 1.023 <1.035    Ph 5.0 5.0 - 8.0    Glucose >=1000 (A) Negative mg/dL    Ketones 15 (A) Negative mg/dL    Protein 30 (A) Negative mg/dL    Bilirubin Negative Negative    Urobilinogen, Urine 0.2 Negative    Nitrite Negative Negative    Leukocyte Esterase Moderate (A) Negative    Occult Blood Moderate (A) Negative    Micro Urine Req Microscopic    PERIPHERAL SMEAR REVIEW   Result Value Ref Range    Peripheral Smear Review see below    PLATELET ESTIMATE   Result Value Ref Range    Plt Estimation Normal    MORPHOLOGY   Result Value Ref Range    RBC Morphology Present    DIFFERENTIAL COMMENT   Result Value Ref Range    Comments-Diff see below    URINE MICROSCOPIC (W/UA)   Result Value Ref Range    WBC Packed (A) /hpf    RBC 5-10 (A) /hpf    Bacteria Negative None /hpf    Epithelial Cells Negative /hpf    Hyaline Cast 0-2 /lpf    Budding Yeast Present (A) Absent /hpf   VENOUS BLOOD GAS   Result Value Ref Range    Venous Bg Ph 7.15 (L) 7.31 - 7.45    Venous Bg Pco2 37.0 (L) 41.0 - 51.0 mmHg    Venous Bg Po2 33.6 25.0 - 40.0 mmHg    Venous Bg O2 Saturation 46.4 %    Venous Bg Hco3 13 (L) 24 - 28 mmol/L    Venous Bg Base Excess -15 mmol/L    Body Temp see below Centigrade   TROPONIN   Result Value Ref Range    Troponin I 0.04 0.00 - 0.04 ng/mL   TSH   Result Value Ref Range    TSH 0.320 (L) 0.380 - 5.330 uIU/mL   PT/INR   Result Value Ref Range    PT 15.7 (H) 12.0 - 14.6 sec    INR 1.24 (H) 0.87 - 1.13   PTT    Result Value Ref Range    APTT 27.1 24.7 - 36.0 sec   BETA-HYDROXYBUTYRIC ACID   Result Value Ref Range    beta-Hydroxybutyric Acid >8.00 (H) 0.02 - 0.27 mmol/L   CREATINE KINASE   Result Value Ref Range    CPK Total 135 0 - 154 U/L   ESTIMATED GFR   Result Value Ref Range    GFR If  12 (A) >60 mL/min/1.73 m 2    GFR If Non African American 10 (A) >60 mL/min/1.73 m 2   EKG   Result Value Ref Range    Report       [facility] Emergency Dept.    Test Date:  2019  Pt Name:    CANDICE TURNER                Department: ER  MRN:        [id number]                      Room:        02  Gender:     Female                       Technician: [id number]  :        1960                   Requested By:ER TRIAGE PROTOCOL  Order #:    [id number]                    Reading MD: DERICK RENAE MD    Measurements  Intervals                                Axis  Rate:       137                          P:  CO:                                      QRS:        57  QRSD:       102                          T:          62  QT:         352  QTc:        532    Interpretive Statements  ATRIAL FIBRILLATION  PROLONGED QT INTERVAL  Compared to ECG 2018 15:17:19  Prolonged QT interval now present  Sinus rhythm no longer present    Electronically Signed On 2019 14:04:59 PST by DERICK RENAE MD           RADIOLOGY  DX-CHEST-PORTABLE (1 VIEW)   Final Result      Right central line projects over the SVC. No pneumothorax.         CT-HEAD W/O   Final Result      No acute intracranial abnormality is identified.      DX-CHEST-PORTABLE (1 VIEW)   Final Result      No acute cardiopulmonary process is seen.      Atherosclerotic plaque.        The radiologist's interpretation of all radiological studies have been reviewed by me.    COURSE & MEDICAL DECISION MAKING  Nursing notes, VS, PMSFHx reviewed in chart.    12:02 PM Patient seen and examined at bedside. Patient will be treated with IV fluids and  ceftriaxone. Ordered for sepsis bundle, x-ray, CT to evaluate her symptoms.     Patient is receiving IV fluids, still persistently tachycardic, central line placed    CENTRAL LINE PROCEDURE NOTE: (with ultrasound guidance)  The patient was consented all risks benefits and alternatives were discussed.  LOCATION: Right IJ  POSITION:  trendelenburg.  PROCEDURE NOTE:  Sterile prep, gown, and drape protocols were used. The vascular triangle was identified. Local anesthesia 1%  lidocaine 3cc was infiltrated with a 27 gauge needle. Using ultrasound guidance, the right internal jugular was canulated with good flow. At no point in the procedure was air aspirated with needle introduction. Wire was passed without difficulty. Dilation was performed. The central line catheter was passed over the wire without difficulty. The line was secured in place using 4-0 nylon suture. Air in the various ports was evacuated and the lines were flushed by me. Patient tolerated the procedure well there were no complications. A chest x-ray was ordered prior to use of the IV.    POST-PROCEDURE CXR:  There is no pneumothorax present. The tip of the line is well positioned at the SVC /RA junction.      With continued fluids her heart rate is decreasing to the low 100s, blood pressure is improving    Patient appears more alert although she is still confused    Discussed case with [facility] ICU team for admission    Discussed case with Dr. Carlson from pulmonology/critical care    2:36 PM  Blood pressure and heart rate continued to improve with fluid administration      The total critical care time spent on this patient was 40 minutes, resuscitating patient, speaking with admitting physician, and interpreting test results. This 40 minutes is exclusive of separately billable procedures.    HYDRATION: Based on the patient's presentation of Dehydration and DKA the patient was given IV fluids. IV Hydration was used because oral hydration was not as rapid as  required. Upon recheck following hydration, the patient was improved.    Decision Making:  This is a 58 y.o. female presenting after being found down.  She does appear to have acute kidney failure likely from volume depletion and dehydration and has been started on aggressive fluids.  She does have concomitant DKA.  Her pH is 7.1 and blood sugar 1771 so she may be more hyperosmolar in nature.  She again was given fluid resuscitation and started on insulin drip.  Patient additionally has evidence of urinary tract infection, likely sepsis as well central line was placed in case of need for pressors although she has been fluid responsive at this time and given IV ceftriaxone for the.  There is no evidence of intracranial bleed or lesion has etiology of her altered mental status and this is likely secondary to her hyperosmolar state.  ECG initially with atrial fibrillation with RVR this did improve with fluids and I think this is more secondary to her baseline disease then primary cardiac    Patient is admitted in critical condition    FINAL IMPRESSION  1. Diabetic ketoacidosis without coma associated with type 1 diabetes mellitus (HCC)    2. Dehydration    3. Atrial fibrillation with RVR ([facility])    4. Acute UTI    5. Sepsis, due to unspecified organism ([facility])          The note accurately reflects work and decisions made by me.  Greg Monroe  2/12/2019  2:37 PM

## 2019-02-12 NOTE — CONSULTS
Critical Care Consultation    Date of consult: 2019    Referring Physician  Greg Monroe M.D.    Reason for Consultation  DKA    History of Presenting Illness  58 y.o. female who presented 2019 with past medical history significant for diabetes and hypertension who was found down by unknown persons for approximately 3 days confused.  She was found to have a glucose greater than 600 and patient with rapid ventricular response.  In the emergency department she was found to be in DKA and with IV fluids her heart rate started to improve and she converted back into normal sinus rhythm.  She is unable to provide any history at this time given her altered mentation.    Code Status  Full Code    Review of Systems  Review of Systems   Unable to perform ROS: Mental status change       Past Medical History   has a past medical history of DM (diabetes mellitus) (HCC) and HTN (hypertension).    Surgical History   has a past surgical history that includes other; hysterectomy, total abdominal; cholecystectomy; appendectomy;  DELIVERY SER; other orthopedic surgery; and laminotomy.    Family History  family history includes Cancer in her father; Diabetes in her mother and sister; Heart Disease in her father.    Social History   reports that she has been smoking.  She has been smoking about 1.00 pack per day. She has never used smokeless tobacco. She reports that she does not drink alcohol or use drugs.    Medications  Home Medications     Reviewed by Kavon Rehman (Pharmacy Tech) on 19 at 1341  Med List Status: Complete   Medication Last Dose Status   albuterol 108 (90 Base) MCG/ACT Aero Soln inhalation aerosol UNK Active   atorvastatin (LIPITOR) 40 MG Tab UNK Active   clonazePAM (KLONOPIN) 1 MG Tab UNK Active   cyclobenzaprine (FLEXERIL) 10 MG Tab UNK Active   DULoxetine (CYMBALTA) 30 MG Cap DR Particles UNK Active   Insulin Glargine (BASAGLAR KWIKPEN) 100 UNIT/ML Solution Pen-injector UNK  Active   lisinopril (PRINIVIL) 10 MG Tab UNK Active   metformin (GLUCOPHAGE) 500 MG Tab UNK Active   metoprolol SR (TOPROL XL) 50 MG TABLET SR 24 HR UNK Active   omeprazole (PRILOSEC) 20 MG delayed-release capsule UNK Active   pioglitazone (ACTOS) 30 MG Tab UNK Active   rivaroxaban (XARELTO) 20 MG Tab tablet UNK Active   spironolactone (ALDACTONE) 50 MG Tab UNK Active   tramadol (ULTRAM) 50 MG Tab UNK Active   zolpidem (AMBIEN) 5 MG Tab UNK Active              Current Facility-Administered Medications   Medication Dose Route Frequency Provider Last Rate Last Dose   • insulin regular human (HUMULIN/NOVOLIN R) 62.5 Units in  mL Infusion for DKA  3 Units/hr Intravenous Continuous Greg Monroe M.D. 12 mL/hr at 02/12/19 1341 3 Units/hr at 02/12/19 1341   • senna-docusate (PERICOLACE or SENOKOT S) 8.6-50 MG per tablet 2 Tab  2 Tab Oral BID Ike Prado M.D.        And   • polyethylene glycol/lytes (MIRALAX) PACKET 1 Packet  1 Packet Oral QDAY PRN Ike Prado M.D.        And   • magnesium hydroxide (MILK OF MAGNESIA) suspension 30 mL  30 mL Oral QDAY PRN Ike Prado M.D.        And   • bisacodyl (DULCOLAX) suppository 10 mg  10 mg Rectal QDAY PRN Ike Prado M.D.       • Respiratory Care per Protocol   Nebulization Continuous RT Ike Prado M.D.       • enoxaparin (LOVENOX) inj 40 mg  40 mg Subcutaneous DAILY Ike Prado M.D.       • hydrALAZINE (APRESOLINE) injection 10 mg  10 mg Intravenous Q4HRS PRN Ike Prado M.D.         Current Outpatient Prescriptions   Medication Sig Dispense Refill   • Insulin Glargine (BASAGLAR KWIKPEN) 100 UNIT/ML Solution Pen-injector Inject 80 Units as instructed every evening.     • DULoxetine (CYMBALTA) 30 MG Cap DR Particles Take 30 mg by mouth 2 times a day.     • cyclobenzaprine (FLEXERIL) 10 MG Tab Take 10 mg by mouth 3 times a day as needed.     • atorvastatin (LIPITOR) 40 MG Tab Take 40  mg by mouth every day.  3   • lisinopril (PRINIVIL) 10 MG Tab Take 10 mg by mouth every day.  3   • pioglitazone (ACTOS) 30 MG Tab Take 30 mg by mouth every day.  3   • albuterol 108 (90 Base) MCG/ACT Aero Soln inhalation aerosol Inhale 2 Puffs by mouth every 6 hours as needed for Shortness of Breath. 8.5 g 0   • clonazePAM (KLONOPIN) 1 MG Tab Take 1 mg by mouth 3 times a day.     • zolpidem (AMBIEN) 5 MG Tab Take 5 mg by mouth at bedtime as needed for Sleep.     • tramadol (ULTRAM) 50 MG Tab Take 50 mg by mouth every four hours as needed.     • omeprazole (PRILOSEC) 20 MG delayed-release capsule Take 1 Cap by mouth every day. 30 Cap 3   • spironolactone (ALDACTONE) 50 MG Tab Take 1 Tab by mouth every day. 30 Tab 3   • metoprolol SR (TOPROL XL) 50 MG TABLET SR 24 HR Take 50 mg by mouth every day.     • rivaroxaban (XARELTO) 20 MG Tab tablet Take 20 mg by mouth with dinner.     • metformin (GLUCOPHAGE) 500 MG Tab Take 1,000 mg by mouth 2 times a day, with meals.         Allergies  Allergies   Allergen Reactions   • Sulfa Drugs Nausea     N/V nervousness       Vital Signs last 24 hours  Temp:  [36.5 °C (97.7 °F)] 36.5 °C (97.7 °F)  Pulse:  [] 111  Resp:  [17-27] 20  SpO2:  [75 %-99 %] 98 %    Physical Exam  Physical Exam   Constitutional: She appears distressed.   Ill-appearing, malnourished, critically ill   HENT:   Head: Normocephalic and atraumatic.   Nose: Nose normal.   Mouth/Throat: No oropharyngeal exudate.   Very dry oral mucosal membranes   Eyes: Pupils are equal, round, and reactive to light. Conjunctivae are normal. No scleral icterus.   Neck: Neck supple. No JVD present. No tracheal deviation present.   Flattened neck veins, right IJ central venous catheter in position without hematoma   Cardiovascular: Regular rhythm.   Occasional extrasystoles are present. Tachycardia present.  PMI is not displaced.  Exam reveals decreased pulses. Exam reveals no distant heart sounds.    No murmur heard.  Pulses:        Radial pulses are 1+ on the right side, and 1+ on the left side.   Mildly delayed capillary refill   Pulmonary/Chest: No accessory muscle usage. Tachypnea noted. She has no decreased breath sounds. She has no wheezes. She has rhonchi in the right lower field and the left lower field. She has no rales.   [place] pattern of breathing   Abdominal: Soft. Bowel sounds are normal. She exhibits no distension. There is tenderness (Epigastric). There is no rebound and no guarding.   Genitourinary:   Genitourinary Comments: Walls catheter in place   Musculoskeletal: She exhibits no edema or deformity.   Moderate clubbing, bruising of right upper extremity   Neurological:   Drowsy, moves all extremities, speaks in 1-2 word sentences but is very confused, no focal deficits, generalized weakness   Skin: Skin is dry. No rash noted. She is not diaphoretic. No erythema. There is pallor.   Psychiatric:   Unable to assess given current clinical condition   Nursing note and vitals reviewed.      Fluids  No intake or output data in the 24 hours ending 02/12/19 1425    Laboratory  Recent Results (from the past 48 hour(s))   CBC WITH DIFFERENTIAL    Collection Time: 02/12/19 11:05 AM   Result Value Ref Range    WBC 10.5 4.8 - 10.8 K/uL    RBC 5.23 4.20 - 5.40 M/uL    Hemoglobin 13.5 12.0 - 16.0 g/dL    Hematocrit 51.5 (H) 37.0 - 47.0 %    MCV 98.5 (H) 81.4 - 97.8 fL    MCH 25.8 (L) 27.0 - 33.0 pg    MCHC 26.2 (L) 33.6 - 35.0 g/dL    RDW 51.1 (H) 35.9 - 50.0 fL    Platelet Count 331 164 - 446 K/uL    MPV 12.8 9.0 - 12.9 fL    Neutrophils-Polys 73.40 (H) 44.00 - 72.00 %    Lymphocytes 16.40 (L) 22.00 - 41.00 %    Monocytes 8.90 0.00 - 13.40 %    Eosinophils 0.10 0.00 - 6.90 %    Basophils 0.40 0.00 - 1.80 %    Immature Granulocytes 0.80 0.00 - 0.90 %    Nucleated RBC 0.00 /100 WBC    Neutrophils (Absolute) 7.72 (H) 2.00 - 7.15 K/uL    Lymphs (Absolute) 1.72 1.00 - 4.80 K/uL    Monos (Absolute) 0.94 (H) 0.00 - 0.85 K/uL    Eos  (Absolute) 0.01 0.00 - 0.51 K/uL    Baso (Absolute) 0.04 0.00 - 0.12 K/uL    Immature Granulocytes (abs) 0.08 0.00 - 0.11 K/uL    NRBC (Absolute) 0.00 K/uL    Hypochromia 1+     Anisocytosis 1+     Microcytosis 1+    COMP METABOLIC PANEL    Collection Time: 02/12/19 11:05 AM   Result Value Ref Range    Sodium 126 (L) 135 - 145 mmol/L    Potassium 4.8 3.6 - 5.5 mmol/L    Chloride 72 (L) 96 - 112 mmol/L    Co2 14 (L) 20 - 33 mmol/L    Anion Gap 40.0 (H) 0.0 - 11.9    Glucose 1771 (HH) 65 - 99 mg/dL    Bun 113 (HH) 8 - 22 mg/dL    Creatinine 4.54 (H) 0.50 - 1.40 mg/dL    Calcium 9.1 8.5 - 10.5 mg/dL    AST(SGOT) 14 12 - 45 U/L    ALT(SGPT) 12 2 - 50 U/L    Alkaline Phosphatase 169 (H) 30 - 99 U/L    Total Bilirubin 1.2 0.1 - 1.5 mg/dL    Albumin 3.8 3.2 - 4.9 g/dL    Total Protein 7.1 6.0 - 8.2 g/dL    Globulin 3.3 1.9 - 3.5 g/dL    A-G Ratio 1.2 g/dL   HCG QUAL SERUM    Collection Time: 02/12/19 11:05 AM   Result Value Ref Range    Beta-Hcg Qualitative Serum Negative Negative   DIAGNOSTIC ALCOHOL    Collection Time: 02/12/19 11:05 AM   Result Value Ref Range    Diagnostic Alcohol 0.00 0.00 g/dL   URINE DRUG SCREEN (TRIAGE)    Collection Time: 02/12/19 11:05 AM   Result Value Ref Range    Amphetamines Urine Negative Negative    Barbiturates Negative Negative    Benzodiazepines Negative Negative    Cocaine Metabolite Negative Negative    Methadone Negative Negative    Opiates Negative Negative    Oxycodone Negative Negative    Phencyclidine -Pcp Negative Negative    Propoxyphene Negative Negative    Cannabinoid Metab Negative Negative   Lactic acid (lactate)    Collection Time: 02/12/19 11:05 AM   Result Value Ref Range    Lactic Acid 4.8 (HH) 0.5 - 2.0 mmol/L   URINALYSIS    Collection Time: 02/12/19 11:05 AM   Result Value Ref Range    Color Yellow     Character Turbid (A)     Specific Gravity 1.023 <1.035    Ph 5.0 5.0 - 8.0    Glucose >=1000 (A) Negative mg/dL    Ketones 15 (A) Negative mg/dL    Protein 30 (A)  Negative mg/dL    Bilirubin Negative Negative    Urobilinogen, Urine 0.2 Negative    Nitrite Negative Negative    Leukocyte Esterase Moderate (A) Negative    Occult Blood Moderate (A) Negative    Micro Urine Req Microscopic    PERIPHERAL SMEAR REVIEW    Collection Time: 02/12/19 11:05 AM   Result Value Ref Range    Peripheral Smear Review see below    PLATELET ESTIMATE    Collection Time: 02/12/19 11:05 AM   Result Value Ref Range    Plt Estimation Normal    MORPHOLOGY    Collection Time: 02/12/19 11:05 AM   Result Value Ref Range    RBC Morphology Present    DIFFERENTIAL COMMENT    Collection Time: 02/12/19 11:05 AM   Result Value Ref Range    Comments-Diff see below    URINE MICROSCOPIC (W/UA)    Collection Time: 02/12/19 11:05 AM   Result Value Ref Range    WBC Packed (A) /hpf    RBC 5-10 (A) /hpf    Bacteria Negative None /hpf    Epithelial Cells Negative /hpf    Hyaline Cast 0-2 /lpf    Budding Yeast Present (A) Absent /hpf   TROPONIN    Collection Time: 02/12/19 11:05 AM   Result Value Ref Range    Troponin I 0.04 0.00 - 0.04 ng/mL   TSH    Collection Time: 02/12/19 11:05 AM   Result Value Ref Range    TSH 0.320 (L) 0.380 - 5.330 uIU/mL   PT/INR    Collection Time: 02/12/19 11:05 AM   Result Value Ref Range    PT 15.7 (H) 12.0 - 14.6 sec    INR 1.24 (H) 0.87 - 1.13   PTT    Collection Time: 02/12/19 11:05 AM   Result Value Ref Range    APTT 27.1 24.7 - 36.0 sec   BETA-HYDROXYBUTYRIC ACID    Collection Time: 02/12/19 11:05 AM   Result Value Ref Range    beta-Hydroxybutyric Acid >8.00 (H) 0.02 - 0.27 mmol/L   CREATINE KINASE    Collection Time: 02/12/19 11:05 AM   Result Value Ref Range    CPK Total 135 0 - 154 U/L   ESTIMATED GFR    Collection Time: 02/12/19 11:05 AM   Result Value Ref Range    GFR If  12 (A) >60 mL/min/1.73 m 2    GFR If Non African American 10 (A) >60 mL/min/1.73 m 2   EKG    Collection Time: 02/12/19 11:23 AM   Result Value Ref Range    [facility]  [place] [facility].    Test Date:  2019  Pt Name:    CANDICE TURNER                Department: ER  MRN:        [id number]                      Room:       RD 02  Gender:     Female                       Technician: [id number]  :        1960                   Requested By:ER TRIAGE PROTOCOL  Order #:    [id number]                    Reading MD: DERICK RENAE MD    Measurements  Intervals                                Axis  Rate:       137                          P:  IL:                                      QRS:        57  QRSD:       102                          T:          62  QT:         352  QTc:        532    Interpretive Statements  ATRIAL FIBRILLATION  PROLONGED QT INTERVAL  Compared to ECG 2018 15:17:19  Prolonged QT interval now present  Sinus rhythm no longer present    Electronically Signed On 2019 14:04:59 PST by DERICK RENAE MD     VENOUS BLOOD GAS    Collection Time: 19 12:25 PM   Result Value Ref Range    Venous Bg Ph 7.15 (L) 7.31 - 7.45    Venous Bg Pco2 37.0 (L) 41.0 - 51.0 mmHg    Venous Bg Po2 33.6 25.0 - 40.0 mmHg    Venous Bg O2 Saturation 46.4 %    Venous Bg Hco3 13 (L) 24 - 28 mmol/L    Venous Bg Base Excess -15 mmol/L    Body Temp see below Centigrade       Imaging  DX-CHEST-PORTABLE (1 VIEW)   Final Result      Right central line projects over the SVC. No pneumothorax.         CT-HEAD W/O   Final Result      No acute intracranial abnormality is identified.      DX-CHEST-PORTABLE (1 VIEW)   Final Result      No acute cardiopulmonary process is seen.      Atherosclerotic plaque.       *Personally reviewed chest x-ray and compared to prior showing no acute cardia pulmonary process with a right IJ central venous catheter and cervical hardware in place   *Personally reviewed EKG showing H fibrillation with rapid ventricular response with a heart rate of 137 with a prolonged QTc interval 432    Assessment/Plan  Paroxysmal atrial fibrillation (HCC)   Assessment &  Plan    Presumed to be new onset  Rate controlled with fluids  Continue Xarelto  Consider beta-blocker or calcium channel blocker if he goes into RVR again     Lactic acidosis   Assessment & Plan    Secondary to DKA  Monitor with fluid resuscitation  Check alcohol level     Acute renal failure with acute tubular necrosis superimposed on stage 3 chronic kidney disease (HCC)   Assessment & Plan    Secondary to ATN, dehydration  Aggressive fluid resuscitation  Monitor kidney function and urine output closely  Monitor electrolytes and replace as needed     Metabolic encephalopathy- (present on admission)   Assessment & Plan    Secondary to acute kidney injury and diabetic ketoacidosis  Avoid sedatives  Every 4 hours neuro checks  Aspiration precautions     DKA (diabetic ketoacidoses) (HCC)- (present on admission)   Assessment & Plan    Unclear cause, most likely secondary to noncompliance given historical reasons versus UTI  DKA protocol with ongoing insulin drip  Aggressive fluid resuscitation  N.p.o.  Close monitoring of glucose as well as electrolytes, anion gap, bicarb     Acute cystitis with hematuria   Assessment & Plan    Empiric antibiotics, follow-up on urine culture     Hyponatremia   Assessment & Plan    Pseudohyponatremia  Monitor with fluid resuscitation and glucose control     Hyperlipidemia- (present on admission)   Assessment & Plan    Continue atorvastatin     Hypertension- (present on admission)   Assessment & Plan    SBP goal less than 160  PRN hydralazine  Resume outpatient antihypertensives once able to safely swallow     Hyperosmolarity due to secondary diabetes mellitus (HCC)- (present on admission)   Assessment & Plan    IV fluid resuscitation     Depression- (present on admission)   Assessment & Plan    Cymbalta     History of DVT (deep vein thrombosis)- (present on admission)   Assessment & Plan    Continue Xarelto     Full code    Discussed patient condition and risk of morbidity and/or  mortality with RN, RT, Pharmacy, [facility] resident and Patient.    The patient remains critically ill.  Critical care time = 97 minutes in directly providing and coordinating critical care and extensive data review.  No time overlap and excludes procedures.

## 2019-02-12 NOTE — ASSESSMENT & PLAN NOTE
History of hypertension, hypertensive now, holding all medicines remain low blood pressure    Resume outpatient antihypertensives once able to safely swallow

## 2019-02-12 NOTE — ASSESSMENT & PLAN NOTE
Secondary to ATN, dehydration  Query chronic kidney disease related to diabetes/hypertension  Aggressive fluid resuscitation ongoing  Monitor kidney function and urine output closely, creatinine actually improved today versus yesterday a.m. although urine output marginal  Monitor electrolytes and replace as needed  Patient not a good candidate for dialysis  With operative findings would not escalate care to renal replacement therapy

## 2019-02-12 NOTE — ED TRIAGE NOTES
Chief Complaint   Patient presents with   • ALOC     Pt BIB EMS/ pt found down/ALOC for 3 days/ s/o unable to care for pt. BG >600 PTA.    • Hyperglycemia   • Atrial Fibrillation

## 2019-02-12 NOTE — ASSESSMENT & PLAN NOTE
Presumed to be new onset  Rate controlled with fluids  Hold Xarelto, just provide prophylactic subcu heparin  Consider beta-blocker or calcium channel blocker if he goes into RVR again as blood pressure will allow  Initially trying to switch from norepinephrine to Ashish-Synephrine/vasopressin to help with blood pressure without exacerbating tachycardia  IV digoxin or even IV amiodarone to help with heart rate if blood pressure remains an issue

## 2019-02-12 NOTE — ED NOTES
"Assist RN: fsbg reading \"hi\", stat BMP ordered, green top drawn and sent to lab, lab called to run BMP, Mg, Ph.    "

## 2019-02-12 NOTE — ASSESSMENT & PLAN NOTE
IV fluid resuscitation  Corrected serum sodium with initial blood work 166  Patient profoundly dehydrated on arrival, water deficit greater > 6 L

## 2019-02-12 NOTE — ASSESSMENT & PLAN NOTE
Pseudohyponatremia, corrected sodium on admit 166  Monitor with fluid resuscitation and glucose control

## 2019-02-12 NOTE — ASSESSMENT & PLAN NOTE
Secondary to hyperosmolar state, acute kidney injury and diabetic ketoacidosis initially  Subsequently felt secondary to sepsis syndrome as well  Avoid sedatives as tolerated  Every 4 hours neuro checks  Aspiration precautions

## 2019-02-12 NOTE — ASSESSMENT & PLAN NOTE
Unclear cause, most likely secondary to noncompliance given historical reasons versus UTI versus other  DKA protocol with ongoing insulin drip  Aggressive fluid resuscitation ongoing  N.p.o.  Close monitoring of glucose as well as electrolytes, anion gap, bicarb  Hypoglycemia protocols

## 2019-02-13 PROBLEM — K55.9 ISCHEMIC BOWEL DISEASE (HCC): Status: ACTIVE | Noted: 2019-01-01

## 2019-02-13 PROBLEM — J96.01 ACUTE RESPIRATORY FAILURE WITH HYPOXIA (HCC): Status: ACTIVE | Noted: 2019-01-01

## 2019-02-13 PROBLEM — R65.21 SEPTIC SHOCK (HCC): Status: ACTIVE | Noted: 2019-01-01

## 2019-02-13 PROBLEM — A41.9 SEPTIC SHOCK (HCC): Status: ACTIVE | Noted: 2019-01-01

## 2019-02-13 NOTE — DISCHARGE PLANNING
Anticipated Discharge Disposition: unknown    Action: TC from Friend    Received TC from pt's friend, Ki Couhc. At first Ki stated he was the pt's spouse. He later admitted only being a friend. Ki stated that he and the pt live together. He went on to say that the pt was not  and did not have any children. Ki thought that her family may be in [place] state but was not completely sure. Evan stated that he relied on the pt to assist him with his day to day care needs.     TC to [facility]'s Office. Requested a wellness check. For Ki.      Barriers to Discharge: None    Plan: A bioethics referral has been placed to determine the pt NOK .

## 2019-02-13 NOTE — DISCHARGE PLANNING
Anticipated Discharge Disposition: Unknown    Action: Bioethics Referral    After being informed by SW supervisor, Julianna Hewitt that the chart information for the pt indicates that it is a different person, This LSW updated the pt's treatment team. It was decided to submit a [facility] Bioethics Referral.    Completed Referral form. Faxed to, [phone]    Barriers to Discharge: Determining the identity of the pt.    Plan: review the recommendations of the Bioethic Committee.

## 2019-02-13 NOTE — OR SURGEON
Immediate Post OP Note    PreOp Diagnosis: Ischemic and Infarcted Bowel    PostOp Diagnosis: Infarcted ileum and ascending colon    Procedure(s):  EXPLORATORY LAPAROTOMY - Wound Class: Clean Contaminated    Surgeon(s):  JOHAN Hendricks M.D.    Anesthesiologist/Type of Anesthesia:GET    Anesthesiologist: Mike Melendez M.D./General    Surgical Staff:  Circulator: Ramona Alcantar R.N.  Relief Circulator: Rima Patel R.N.  Scrub Person: Leah Nunn, Bishnu Ass't; [facility] DEWAYNE [place] R.N.  [place]: Lilliam Steel R.N.    Specimens removed if any:  None    Estimated Blood Loss: None    Findings: Infarcted ileum and ascending colon: given the extent of the injury and patients' co-mordities no resection was performed    Complications: none        2/13/2019 10:29 AM Berhane Escobar M.D.

## 2019-02-13 NOTE — CONSULTS
DATE OF SERVICE:  02/13/2019    REFERRING PHYSICIAN:  [facility] Team.    REASON FOR CONSULTATION:  Acute abdomen.    HISTORY OF PRESENT ILLNESS:  The patient is a 58-year-old female who was found   down at home with altered mental status, was found to be in profound diabetic   ketoacidosis, was brought to the hospital and put in the intensive care unit,   attempts to control her blood sugars have been extremely difficult, she   started complaining of some mild abdominal pain.  CT scan was obtained that   shows free fluid, evidence of small bowel pneumatosis with wall thickening and   I was asked to see the patient.  Patient is not verbal, but is awake.  All   history is obtained from the chart.    PAST SURGICAL HISTORY:  Total abdominal hysterectomy, cholecystectomy,   appendectomy and laminectomy.    PAST MEDICAL HISTORY:  Patient's past medical history as could be obtained is   gastroesophageal reflux disease, type 2 diabetes, insulin-dependent,   hyperlipidemia, decubitus ulcer, chronic pain syndrome, atrial fibrillation,   chronic smoker.    MEDICATIONS:  On admission as far as known is albuterol inhaler, Lipitor 40   mg, Klonopin 1 mg, Flexeril 10 mg, Cymbalta 30 mg, insulin, lisinopril 10 mg,   metformin 500 mg, metoprolol 50 XL mg tablets, omeprazole 20 mg delayed   release, Actos 30 mg p.o. q. day, Xarelto 20 mg p.o. q. day, spironolactone 50   mg p.o. q. day, tramadol 50 mg q. day, and Ambien 5 mg p.o. q. day.    ALLERGIES:  SHE IS ALLERGIC TO SULFA DRUGS.    FAMILY AND SOCIAL HISTORY:  Family and social history is unable to be   obtained.  Based on her record, she smokes, does not use alcohol, no drug use.     Diabetes, cancer, hypertension, and heart disease.    PHYSICAL EXAMINATION:  GENERAL:  A 58-year-old female appears much older, in the intensive care unit,   nonresponsive to verbal stimuli and unable to converse.  HEAD AND NECK:  Pupils equal, round and react to light.  Extraocular  movements   are intact.  No scleral icterus.  No cervical adenopathy.  LUNGS:  Coarse breath sounds and shallow breaths bilaterally.  CARDIAC:  Tachycardic without murmurs, rubs or gallops.  No peripheral edema.  ABDOMEN:  Soft, but exquisitely tender.  No palpable masses, rebound,   guarding, or peritoneal signs.  MUSCULOSKELETAL:  Patient moves all 4 extremities.  NEUROLOGIC:  Responds to pain, does not answer questions.  PSYCHIATRIC:  As stated, nonverbal.    LABORATORY DATA:  Laboratory review shows glucose of 848, her admitting   glucose was over 1000.  Her lactic acid is 3.6.  Sodium of 140, potassium of   4.4, chloride of 99, bicarbonate of 20, BUN of 94, creatinine of 3.22.  Lipase   of 586.  White count low at 4.7, hemoglobin and hematocrit of 10.3 and 32.1   with 214,000 platelets.  CT scan shows multiple dilated loops of bowel with   clusters of small bowel loops in the pelvis demonstrating pneumatosis, bowel   wall thickening.  Small pockets of what appears to be extraluminal free air.    Small amount of ascitic fluid.    IMPRESSION:  1.  Patient with apparent ischemic infarcted bowel.  2.  Diabetic ketoacidosis.  3.  Acute renal failure.  4.  Hypertension.  5.  Atrial fibrillation.  6.  Hyperlipidemia.  7.  Depression.  8.  Tobacco abuse.  9.  Sacral decubitus ulcer.    PLAN:  After examining the patient and reviewing the chart and given her   multiple comorbidities, my feeling is this patient is not a surgical candidate   and should be made comfort care.  I have discussed this with the critical   care attending, Dr. Kumar and the resident.       ____________________________________     Berhane Escobar MD    PMS / NTS    DD:  02/13/2019 03:08:43  DT:  02/13/2019 03:27:52    D#:  [id number]  Job#:  [id number]    cc: Tiago Higgins MD

## 2019-02-13 NOTE — PROGRESS NOTES
· 2 RN skin check complete with RIKKI Sams.  · Devices in place ECG leads, BP cuff, SNC, pulse oximeter, SCDs, Walls catheter, 2 PIV, central line, stat lock, bilateral soft wrist restraints.  · Skin assessed under devices listed above.  · Confirmed pressure ulcers found on N/A.  · Skin areas on elbows, sacrum, knees, heels, blanching/reddish purple (patient hypothermic).  · The following interventions in place silicone nasal cannula, padded PIVs, turning and repositioning q2h with pillows, padded stat lock/Walls tubing, heels floated.

## 2019-02-13 NOTE — PROGRESS NOTES
Lab called with critical result of blood glucose of 945 at 2228.  At 2234 lab called with consecutive critical blood glucose value of 993.  Dr. Loo to bedside, ordered drip to be restarted at 1 unit/hr.

## 2019-02-13 NOTE — PROCEDURES
Central Line Insertion  Date/Time: 2/12/2019 4:44 PM  Performed by: CARMELINA RAMIREZ  Authorized by: CARMELINA RAMIREZ     Consent:     Consent obtained:  Verbal and emergent situation    Consent given by:  Patient    Risks discussed:  Pneumothorax, infection and bleeding  Pre-procedure details:     Hand hygiene: Hand hygiene performed prior to insertion      Sterile barrier technique: All elements of maximal sterile technique followed      Skin preparation:  ChloraPrep    Skin preparation agent: Skin preparation agent completely dried prior to procedure    Sedation:     Sedation type:  None  Anesthesia:     Anesthesia method:  Local infiltration    Local anesthetic:  Lidocaine 2% w/o epi  Procedure details:     Location:  R internal jugular    Patient position:  Trendelenburg    Catheter size:  7 Fr    Landmarks identified: yes      Ultrasound guidance: yes      Sterile ultrasound techniques: Sterile gel and sterile probe covers were used      Number of attempts:  1    Successful placement: yes    Post-procedure details:     Post-procedure:  Dressing applied and line sutured    Assessment:  Blood return through all ports, free fluid flow, no pneumothorax on x-ray and placement verified by x-ray    Patient tolerance of procedure:  Tolerated well, no immediate complications  Comments:      CVC placed in ER accidentally dislodged with transfer per Report.  Hypotension ongoing and may need pressors.  Wire noted in R IJ by U/s.

## 2019-02-13 NOTE — H&P
Internal Medicine Admitting History and Physical    Note Author: Ike Prado M.D.       Name Vin Maciel 1960   Age/Sex 58 y.o. female   MRN [id number]   Code Status full     After 5PM or if no immediate response to page, please call for cross-coverage  Attending/Team: Dr.Jeremy Gonda See Patient List for primary contact information  Call [phone] to page    1st Call - Day Intern (R1):    2nd Call - Day Sr. Resident (R2/R3):          Chief Complaint:   Altered mentation    HPI: Patient is a 58-year-old female with a past medical history of GERD, esophagitis, degenerative lumbar disc disease, type 2 diabetes mellitus on insulin, history of hyperlipidemia, decubitus ulcer on the sacral region stage II, history of chronic pain syndrome and insomnia, atrial fibrillation and chronic smoking, as per charts, presents to the emergency department of the [facility] on 2019 after being found on the floor with altered mentation, apparently for about 2-3 days.  When seen in the emergency department patient was found to be extremely dehydrated and confused, and not responsive.  Patient had no physical signs of physical trauma on presentation, but history not able to be obtained as patient near comatose.  On presentation , patient had an heart rate of 112, temperature of 97.7, respiration of 20 with an SPO2 of 97 and a BMI of 23.8.  Blood results showed a sodium of 126 potassium of 4.8 chloride of 72 and a bicarb of 14 anion gap of 40 and a blood glucose of 1771 with a BUN of 113 and a creatinine of 4.24 with a magnesium of 2.3 and an ammonia of 53 with lactic acid of 4.8.  Magnesium on presentation was 2.3 which trended down to 1.9.  Will be admitted into the ICU on telemetry for severe diabetic ketoacidosis, for close monitoring, fluid resuscitation, and full septic workup.  CT of the head done on presentation revealed no acute abnormality , with x-rays revealing no  pleural fluid collection or consolidation.      Review of Systems   Unable to perform ROS: Acuity of condition             Past Medical History (Chronic medical problem, known complications and current treatment)        Past Surgical History:  Past Surgical History:   Procedure Laterality Date   • APPENDECTOMY     • CHOLECYSTECTOMY     • HCHG  DELIVERY SER     • HYSTERECTOMY, TOTAL ABDOMINAL     • LAMINOTOMY     • OTHER      back, neck, shoulder surgeries   • OTHER ORTHOPEDIC SURGERY     Patient has past surgical history that includes a total abdominal hysterectomy, cholecystectomy, appendicectomy, and a laminectomy.    Current Outpatient Medications:  Home Medications     Reviewed by Kavon Rehman (Pharmacy Tech) on 19 at 1341  Med List Status: Complete   Medication Last Dose Status   albuterol 108 (90 Base) MCG/ACT Aero Soln inhalation aerosol UNK Active   atorvastatin (LIPITOR) 40 MG Tab UNK Active   clonazePAM (KLONOPIN) 1 MG Tab UNK Active   cyclobenzaprine (FLEXERIL) 10 MG Tab UNK Active   DULoxetine (CYMBALTA) 30 MG Cap DR Particles UNK Active   Insulin Glargine (BASAGLAR KWIKPEN) 100 UNIT/ML Solution Pen-injector UNK Active   lisinopril (PRINIVIL) 10 MG Tab UNK Active   metformin (GLUCOPHAGE) 500 MG Tab UNK Active   metoprolol SR (TOPROL XL) 50 MG TABLET SR 24 HR UNK Active   omeprazole (PRILOSEC) 20 MG delayed-release capsule UNK Active   pioglitazone (ACTOS) 30 MG Tab UNK Active   rivaroxaban (XARELTO) 20 MG Tab tablet UNK Active   spironolactone (ALDACTONE) 50 MG Tab UNK Active   tramadol (ULTRAM) 50 MG Tab UNK Active   zolpidem (AMBIEN) 5 MG Tab UNK Active                Medication Allergy/Sensitivities:  Allergies   Allergen Reactions   • Sulfa Drugs Nausea     N/V nervousness         Family History (mandatory)   Family History   Problem Relation Age of Onset   • Diabetes Mother    • Heart Disease Father    • Cancer Father    • Diabetes Sister        Social History (mandatory)    "  Social History     Social History   • Marital status:      Spouse name: N/A   • Number of children: N/A   • Years of education: N/A     Occupational History   • Not on file.     Social History Main Topics   • Smoking status: Current Every Day Smoker     Packs/day: 1.00   • Smokeless tobacco: Never Used   • Alcohol use No   • Drug use: No   • Sexual activity: Not on file     Other Topics Concern   • Not on file     Social History Narrative   • No narrative on file     Living situation: Lives alone at home per EMS  PCP : Tiago Higgins M.D.    Physical Exam     Vitals:    02/12/19 1800 02/12/19 1805 02/12/19 1815 02/12/19 1820   Pulse: (!) 125 (!) 126 (!) 129 (!) 131   Resp: (!) 26 (!) 30 (!) 27 (!) 24   Temp:       TempSrc:       SpO2: 93% 92% 99% 97%   Weight:       Height:         Body mass index is 25.93 kg/m².  Pulse (!) 131   Temp 36.5 °C (97.7 °F) (Temporal)   Resp (!) 24   Ht 1.575 m (5' 2\")   Wt 64.3 kg (141 lb 12.1 oz)   SpO2 97%   BMI 25.93 kg/m²   O2 therapy: Pulse Oximetry: 97 %, O2 (LPM): 2, O2 Delivery: Silicone Nasal Cannula    Physical Exam   Constitutional:   Severely malnourished, and extremely dry, with decreased fat, and right IJ catheter inserted   HENT:   Head: Normocephalic and atraumatic.   Eyes: Pupils are equal, round, and reactive to light.   Neck: Normal range of motion. Neck supple.   Cardiovascular: Exam reveals no friction rub.    No murmur heard.  Tachycardic, with heart rate irregularly irregular   Pulmonary/Chest: She is in respiratory distress. She exhibits no tenderness.   Breath sounds decreased bilaterally with bibasilar crackles and intermittent wheeze   Abdominal: Soft. Bowel sounds are normal. She exhibits no distension.   Musculoskeletal: She exhibits no edema or deformity.   Neurological:   Confused, near comatose, not responding well   Skin: Skin is warm and dry.       Data Review       Old Records Request:   Completed  Current Records review/summary: " Completed    Lab Data Review:  Recent Results (from the past 24 hour(s))   CBC WITH DIFFERENTIAL    Collection Time: 02/12/19 11:05 AM   Result Value Ref Range    WBC 10.5 4.8 - 10.8 K/uL    RBC 5.23 4.20 - 5.40 M/uL    Hemoglobin 13.5 12.0 - 16.0 g/dL    Hematocrit 51.5 (H) 37.0 - 47.0 %    MCV 98.5 (H) 81.4 - 97.8 fL    MCH 25.8 (L) 27.0 - 33.0 pg    MCHC 26.2 (L) 33.6 - 35.0 g/dL    RDW 51.1 (H) 35.9 - 50.0 fL    Platelet Count 331 164 - 446 K/uL    MPV 12.8 9.0 - 12.9 fL    Neutrophils-Polys 73.40 (H) 44.00 - 72.00 %    Lymphocytes 16.40 (L) 22.00 - 41.00 %    Monocytes 8.90 0.00 - 13.40 %    Eosinophils 0.10 0.00 - 6.90 %    Basophils 0.40 0.00 - 1.80 %    Immature Granulocytes 0.80 0.00 - 0.90 %    Nucleated RBC 0.00 /100 WBC    Neutrophils (Absolute) 7.72 (H) 2.00 - 7.15 K/uL    Lymphs (Absolute) 1.72 1.00 - 4.80 K/uL    Monos (Absolute) 0.94 (H) 0.00 - 0.85 K/uL    Eos (Absolute) 0.01 0.00 - 0.51 K/uL    Baso (Absolute) 0.04 0.00 - 0.12 K/uL    Immature Granulocytes (abs) 0.08 0.00 - 0.11 K/uL    NRBC (Absolute) 0.00 K/uL    Hypochromia 1+     Anisocytosis 1+     Microcytosis 1+    COMP METABOLIC PANEL    Collection Time: 02/12/19 11:05 AM   Result Value Ref Range    Sodium 126 (L) 135 - 145 mmol/L    Potassium 4.8 3.6 - 5.5 mmol/L    Chloride 72 (L) 96 - 112 mmol/L    Co2 14 (L) 20 - 33 mmol/L    Anion Gap 40.0 (H) 0.0 - 11.9    Glucose 1771 (HH) 65 - 99 mg/dL    Bun 113 (HH) 8 - 22 mg/dL    Creatinine 4.54 (H) 0.50 - 1.40 mg/dL    Calcium 9.1 8.5 - 10.5 mg/dL    AST(SGOT) 14 12 - 45 U/L    ALT(SGPT) 12 2 - 50 U/L    Alkaline Phosphatase 169 (H) 30 - 99 U/L    Total Bilirubin 1.2 0.1 - 1.5 mg/dL    Albumin 3.8 3.2 - 4.9 g/dL    Total Protein 7.1 6.0 - 8.2 g/dL    Globulin 3.3 1.9 - 3.5 g/dL    A-G Ratio 1.2 g/dL   HCG QUAL SERUM    Collection Time: 02/12/19 11:05 AM   Result Value Ref Range    Beta-Hcg Qualitative Serum Negative Negative   DIAGNOSTIC ALCOHOL    Collection Time: 02/12/19 11:05 AM   Result  Value Ref Range    Diagnostic Alcohol 0.00 0.00 g/dL   URINE DRUG SCREEN (TRIAGE)    Collection Time: 02/12/19 11:05 AM   Result Value Ref Range    Amphetamines Urine Negative Negative    Barbiturates Negative Negative    Benzodiazepines Negative Negative    Cocaine Metabolite Negative Negative    Methadone Negative Negative    Opiates Negative Negative    Oxycodone Negative Negative    Phencyclidine -Pcp Negative Negative    Propoxyphene Negative Negative    Cannabinoid Metab Negative Negative   Lactic acid (lactate)    Collection Time: 02/12/19 11:05 AM   Result Value Ref Range    Lactic Acid 4.8 (HH) 0.5 - 2.0 mmol/L   URINALYSIS    Collection Time: 02/12/19 11:05 AM   Result Value Ref Range    Color Yellow     Character Turbid (A)     Specific Gravity 1.023 <1.035    Ph 5.0 5.0 - 8.0    Glucose >=1000 (A) Negative mg/dL    Ketones 15 (A) Negative mg/dL    Protein 30 (A) Negative mg/dL    Bilirubin Negative Negative    Urobilinogen, Urine 0.2 Negative    Nitrite Negative Negative    Leukocyte Esterase Moderate (A) Negative    Occult Blood Moderate (A) Negative    Micro Urine Req Microscopic    PERIPHERAL SMEAR REVIEW    Collection Time: 02/12/19 11:05 AM   Result Value Ref Range    Peripheral Smear Review see below    PLATELET ESTIMATE    Collection Time: 02/12/19 11:05 AM   Result Value Ref Range    Plt Estimation Normal    MORPHOLOGY    Collection Time: 02/12/19 11:05 AM   Result Value Ref Range    RBC Morphology Present    DIFFERENTIAL COMMENT    Collection Time: 02/12/19 11:05 AM   Result Value Ref Range    Comments-Diff see below    URINE MICROSCOPIC (W/UA)    Collection Time: 02/12/19 11:05 AM   Result Value Ref Range    WBC Packed (A) /hpf    RBC 5-10 (A) /hpf    Bacteria Negative None /hpf    Epithelial Cells Negative /hpf    Hyaline Cast 0-2 /lpf    Budding Yeast Present (A) Absent /hpf   TROPONIN    Collection Time: 02/12/19 11:05 AM   Result Value Ref Range    Troponin I 0.04 0.00 - 0.04 ng/mL   TSH     Collection Time: 19 11:05 AM   Result Value Ref Range    TSH 0.320 (L) 0.380 - 5.330 uIU/mL   PT/INR    Collection Time: 19 11:05 AM   Result Value Ref Range    PT 15.7 (H) 12.0 - 14.6 sec    INR 1.24 (H) 0.87 - 1.13   PTT    Collection Time: 19 11:05 AM   Result Value Ref Range    APTT 27.1 24.7 - 36.0 sec   BETA-HYDROXYBUTYRIC ACID    Collection Time: 19 11:05 AM   Result Value Ref Range    beta-Hydroxybutyric Acid >8.00 (H) 0.02 - 0.27 mmol/L   CREATINE KINASE    Collection Time: 19 11:05 AM   Result Value Ref Range    CPK Total 135 0 - 154 U/L   ESTIMATED GFR    Collection Time: 19 11:05 AM   Result Value Ref Range    GFR If  12 (A) >60 mL/min/1.73 m 2    GFR If Non African American 10 (A) >60 mL/min/1.73 m 2   OSMOLALITY SERUM    Collection Time: 19 11:05 AM   Result Value Ref Range    Osmolality Serum 425 (H) 278 - 298 mOsm/kg H2O   EKG    Collection Time: 19 11:23 AM   Result Value Ref Range    Report       [facility] Emergency Dept.    Test Date:  2019  Pt Name:    CANDICE TURNER                Department: ER  MRN:        [id number]                      Room:       [facility]  Gender:     Female                       Technician: [id number]  :        1960                   Requested By:ER TRIAGE PROTOCOL  Order #:    [id number]                    Reading MD: DERICK RENAE MD    Measurements  Intervals                                Axis  Rate:       137                          P:  CT:                                      QRS:        57  QRSD:       102                          T:          62  QT:         352  QTc:        532    Interpretive Statements  ATRIAL FIBRILLATION  PROLONGED QT INTERVAL  Compared to ECG 2018 15:17:19  Prolonged QT interval now present  Sinus rhythm no longer present    Electronically Signed On 2019 14:04:59 PST by DERICK RENAE MD     VENOUS BLOOD GAS    Collection Time: 19  12:25 PM   Result Value Ref Range    Venous Bg Ph 7.15 (L) 7.31 - 7.45    Venous Bg Pco2 37.0 (L) 41.0 - 51.0 mmHg    Venous Bg Po2 33.6 25.0 - 40.0 mmHg    Venous Bg O2 Saturation 46.4 %    Venous Bg Hco3 13 (L) 24 - 28 mmol/L    Venous Bg Base Excess -15 mmol/L    Body Temp see below Centigrade   ACCU-CHEK GLUCOSE    Collection Time: 02/12/19  2:34 PM   Result Value Ref Range    Glucose - Accu-Ck >600 (HH) 65 - 99 mg/dL   Lactic acid (lactate)    Collection Time: 02/12/19  2:37 PM   Result Value Ref Range    Lactic Acid 3.1 (H) 0.5 - 2.0 mmol/L   Comp Metabolic Panel    Collection Time: 02/12/19  2:37 PM   Result Value Ref Range    Sodium 128 (L) 135 - 145 mmol/L    Potassium 3.8 3.6 - 5.5 mmol/L    Chloride 85 (L) 96 - 112 mmol/L    Co2 15 (L) 20 - 33 mmol/L    Anion Gap 28.0 (H) 0.0 - 11.9    Glucose 1659 (HH) 65 - 99 mg/dL    Bun 102 (HH) 8 - 22 mg/dL    Creatinine 4.24 (H) 0.50 - 1.40 mg/dL    Calcium 8.4 (L) 8.5 - 10.5 mg/dL    AST(SGOT) 13 12 - 45 U/L    ALT(SGPT) 12 2 - 50 U/L    Alkaline Phosphatase 134 (H) 30 - 99 U/L    Total Bilirubin 0.7 0.1 - 1.5 mg/dL    Albumin 2.9 (L) 3.2 - 4.9 g/dL    Total Protein 5.3 (L) 6.0 - 8.2 g/dL    Globulin 2.4 1.9 - 3.5 g/dL    A-G Ratio 1.2 g/dL   MAGNESIUM    Collection Time: 02/12/19  2:37 PM   Result Value Ref Range    Magnesium 2.3 1.5 - 2.5 mg/dL   PHOSPHORUS    Collection Time: 02/12/19  2:37 PM   Result Value Ref Range    Phosphorus 6.9 (H) 2.5 - 4.5 mg/dL   CREATINE KINASE    Collection Time: 02/12/19  2:37 PM   Result Value Ref Range    CPK Total 128 0 - 154 U/L   ESTIMATED GFR    Collection Time: 02/12/19  2:37 PM   Result Value Ref Range    GFR If  13 (A) >60 mL/min/1.73 m 2    GFR If Non African American 11 (A) >60 mL/min/1.73 m 2   DIAGNOSTIC ALCOHOL    Collection Time: 02/12/19  4:37 PM   Result Value Ref Range    Diagnostic Alcohol 0.00 0.00 g/dL   Salicylate    Collection Time: 02/12/19  4:37 PM   Result Value Ref Range    Salicylates,  Quant. 0 (L) 15 - 25 mg/dL   ACETAMINOPHEN    Collection Time: 02/12/19  4:37 PM   Result Value Ref Range    Acetaminophen -Tylenol <10 10 - 30 ug/mL   MAGNESIUM    Collection Time: 02/12/19  4:37 PM   Result Value Ref Range    Magnesium 1.9 1.5 - 2.5 mg/dL   AMMONIA    Collection Time: 02/12/19  6:01 PM   Result Value Ref Range    Ammonia 53 (H) 11 - 45 umol/L   ACCU-CHEK GLUCOSE    Collection Time: 02/12/19  6:01 PM   Result Value Ref Range    Glucose - Accu-Ck >600 (HH) 65 - 99 mg/dL       Imaging/Procedures Review:    Independant Imaging Review: Completed  DX-CHEST-PORTABLE (1 VIEW)   Final Result      1.  Supportive tubing as described above.   2.  Mild worsening hypoinflation.      DX-CHEST-PORTABLE (1 VIEW)   Final Result      Right central line projects over the SVC. No pneumothorax.         CT-HEAD W/O   Final Result      No acute intracranial abnormality is identified.      DX-CHEST-PORTABLE (1 VIEW)   Final Result      No acute cardiopulmonary process is seen.      Atherosclerotic plaque.             EKG:   EKG reveals prolonged QTC at 532 with atrial fibrillation  Records reviewed and summarized in current documentation :  Yes  [employer] teaching service handout given to patient:  No-unresponsive and near comatose         Assessment/Plan     DKA (diabetic ketoacidoses) (HCC)- (present on admission)   Assessment & Plan    Patient presented with extreme dehydration, weakness and altered mentation, and responding feebly.  On presentation patient had a heart rate of 112, temperature of 97.7, respiration of 20, with SPO2 of 97 and a BMI of 23.8 with blood results showing a sodium of 126, potassium of 4.8, chloride of 72 and a bicarb of 14 with an anion gap of 40 and a blood glucose of 1771, and a BUN of 113 and a creatinine of 4.24 with elevated lactic acid at 4.8.  -Patient assessed as having severe dehydration with severe diabetic ketoacidosis.  Plan  Admit to the ICU with telemetry.  Monitor patient closely for  electrolyte disturbances and replete as needed.  Started on a DKA protocol  Will workup for sepsis, and order a pro-calcitonin level.  Trend lactic acid levels.  Monitor closely for closure of anion gap.  Continue to monitor                     Paroxysmal atrial fibrillation (HCC)   Assessment & Plan    Rate controlled, on Xarelto.  We will hold off metoprolol and diltiazem for now with low threshold to include them if patient develops A. fib with RVR.  Monitor on telemetry     Hypertension- (present on admission)   Assessment & Plan    Patient to be closely monitored for volume overload and to start IV metoprolol/hydralazine if systolic blood pressure greater than 160.       Lactic acidosis   Assessment & Plan    Lactic acid elevated at 4.8, with elevated ammonia 53 causing severe acidosis with anion gap at 40 on presentation.  Aggressive fluid resuscitation, will trend levels, for adequate rehydration     Metabolic encephalopathy- (present on admission)   Assessment & Plan    -Patient presented confused with altered mental status due to electrolyte imbalance and severe dehydration.  Plan  Aggressive rehydration with correction of electrolyte disturbance.  Replete electrolytes and monitor       History of DVT (deep vein thrombosis)- (present on admission)   Assessment & Plan    Has a history of DVT, will continue Xarelto       Anticipated Hospital stay:  >2 midnights     Quality Measures  Quality-Core Measures   DVT prophylaxis-heparin  Antibiotics-ceftriaxone  Ulcer prophylaxis-ranitidine  Disposition-ICU  PCP: Tiago Higgins M.D.

## 2019-02-13 NOTE — PROGRESS NOTES
MD notified of patient's HR increasing to 120s, on Levo gtt. Will continue with fluid resuscitation, hyperglycemia and re-warming treatments. Continue Levo.

## 2019-02-13 NOTE — PROGRESS NOTES
RCC    Patient with profound hyperglycemia with initial blood sugar 1771 admitted to the unit  Dr. Gonda saw the patient in the ER  I spoke to him at length about this case  Case reviewed with nursing staff including charge nurse and bedside nurse  Called urgently to the bedside on arrival for hypotension  Patient had R received a 3+ liters of fluids in the ER  Central line inadvertently pulled on arrival, not well anchored in the neck by dressing or suture per staff  New right IJ central line placed emergently  Fluid bolus times 2 L administered IV  Blood pressure transiently improved into the 90s-110 range  Walls catheter placed, small amount of urine output, very cloudy and turbid  Borderline respiratory status, O2 saturation 90% on room air  Patient confused and partially oriented but able to answer some questions and participate  Repeat blood sugar in the 1600s  Insulin drip infusing  Blood pressure remaining low with norepinephrine started, actively titrating for hypotension  No pain per patient  Patient quite cool on arrival and 91-92 range and bear hugger initiated to rewarm her  Patient becoming more tachycardic with norepinephrine, monitor looks like sinus tach  50 g albumin requested after approximately 5 L infused on blood pressure still borderline on no therapy at 10 with heart rate now 120-130 sinus tach  Ashish-Synephrine ordered, will try to swap over to this agent and see if we get less tachycardia with up titration of Ashish-Synephrine and weaning of norepinephrine  CVP monitoring, initial pressure 5 after 5 plus liters of IV age every  EKG from ER reviewed, A. fib with rapid rate and prolonged QT, nothing to suggest acute MI  Confirmed with laboratory urine and blood cultures pending  Confirmed with pharmacy IV antibiotics initiated      Repeat labs pending including BMP, CBC and lactic acid level  Monitor for need for intubation and mechanical ventilation, work of breathing acceptable at this  point  Continue to monitor for alternative etiology besides hypovolemia for hypotension  Case reviewed with my associate who is on-call tonight      Case discussed with RN, RT, charge RN, patient, clinical pharmacist and Dr. Gonda along with     The patient remains critically ill.  Critical care time = 75 minutes in directly providing and coordinating critical care and extensive data review.  No time overlap and excludes procedures.

## 2019-02-13 NOTE — PROGRESS NOTES
RCC     Reviewed case with Dr Escobar post op  Severe ischemic bowel small and large intestine  Not operable, abdomen closed - CC recommended - care is futile  D/w SW post op  No family yet but name and SS number may not match  Patient returned to ICU on vent and triple pressors  STAT attempts to ensure correct name and find an advise family being attempted  STAT ethics eval requested    Case D/w RN, Charge RN, [employer] [facility] resident and Dr Escobar  All agree full treat for now but DNR medically and ethically appropriate with surgical findings and current hemodynamics  DNAR/DNI order placed  Will transition to CC when deteriorates or sooner if family arrives or have Ethics eval back     Patient appears to be suffering and will not survive this event in my opinion  Goals of care are transitioning to comfort/compassion  Will allow CM/SW Team 24 hrs to arrange above  Will not escalate care  Probable CC tomorrow unless Ethics Team forcefully disagrees

## 2019-02-13 NOTE — PROGRESS NOTES
Difficulty in processing hourly blood glucose labs throughout the night, making it difficult to follow DKA protocol.  Dr. Loo updated as labs resulted and titration of the insulin drip has been per Dr. Loo's verbal orders throughout the night.  Will continue to send hourly blood glucose labs and update Dr. Loo as necessary.

## 2019-02-13 NOTE — PROGRESS NOTES
Lab called with critical result of blood glucose of 981 at 1945. Critical lab result read back.  Dr. Loo notified of critical lab result at 2126.  Critical lab result read back by Dr. Loo.  Asked for drip to be turned off.  1 L of LR ordered due to increased Lactate and HR in 130s.

## 2019-02-13 NOTE — ASSESSMENT & PLAN NOTE
Holding parameters of 160/110 mmHg.  Hydralazine if needed if blood pressure goal exceeds 160 systolic blood pressure

## 2019-02-13 NOTE — PROGRESS NOTES
[facility] ICU Progress Note      Admit Date: 2/12/2019  ICU Day: 2    Resident(s): Frank Trujillo  Attending: COLE CAR/ Dr. Lambert     Date & Time:   2/13/2019   2:47 PM       Patient ID:    Name:             Vin Maciel     YOB: 1960  Age:                 58 y.o.  female   MRN:               [id number]    HPI:   Patient is a 58-year-old female with a past medical history of GERD, esophagitis, degenerative lumbar disc disease, type 2 diabetes mellitus on insulin, history of hyperlipidemia, decubitus ulcer on the sacral region stage II, history of chronic pain syndrome and insomnia, atrial fibrillation and chronic smoking, as per charts, presents to the emergency department of the [facility] on 2/12/2019 after being found on the floor with altered mentation, apparently for about 2-3 days.  Admitted for DKA, course complicated by ischemic bowel not amenable to resection    Consultants:  Surgery: Dr. Escobar  PMA: Dr Lambert     Interval Events:  Patient developed acute abdomen, surgery consulted overnight, CT abdo, men showed ischemic bowel  Exploratory laparotomy showed  infarcted ileum and ascending colon, not amenable to resection  Patient CODE STATUS changed after several attempts to reach the family unsuccessful  The entire team including attending, RN, charge nurse, and surgeon all agreed to change the CODE STATUS to DNR/DNI,  The plan is to change to comfort care if family comes around, or after ethics evaluation  Tachycardic, respiratory rate in the 20s, blood pressure in the 110s/60s  Lactic acid 3.5, still high anion gap with blood glucose in the 600s, AK I  Patient confused  On 3 pressors    Review of Systems   Unable to perform ROS: Mental acuity       PHYSICAL EXAM  Vitals:    02/13/19 1200 02/13/19 1300 02/13/19 1400 02/13/19 1420   BP:       Pulse: (!) 149 (!) 149 (!) 144 (!) 153   Resp: (!) 30 (!) 31 (!) 36 (!) 38   Temp:       TempSrc: Walls      SpO2: 94% 97% 97% 94%   Weight:    "    Height:         Body mass index is 25.93 kg/m².  /55   Pulse (!) 153   Temp (!) 38.5 °C (101.3 °F) (Walls)   Resp (!) 38   Ht 1.575 m (5' 2\")   Wt 64.3 kg (141 lb 12.1 oz)   SpO2 94%   BMI 25.93 kg/m²   O2 therapy: Pulse Oximetry: 94 %, O2 (LPM): 5, FiO2%: 100 %, O2 Delivery: Ventilator    Physical Exam  General: Confused, not in distress  HEENT: Normocephalic, atraumatic, no jaundice or scleral icterus, no pallor,  Respiratory: Decreased air entry at lung bases, no rales  Cardiac: Tachycardic, S1/S2 present, no M/R/G  GI: abdomen non distended, soft, non tender, no organomegaly, bowel sounds normoactive  Neuro: Confused, but alert  Msk/Extremities: radial, dorsalis pedis pulses 2+b/l, no joint swelling or redness,   Skin: No rash        Respiratory:  Treadwell Vent Mode: APVCMV  Respiration: (!) 38, Pulse Oximetry: 94 %, O2 Daily Delivery Respiratory : Room Air with O2 Available    Chest Tube Drains:    Recent Labs      02/13/19   0534   ISTATAPH  7.381*   ISTATAPCO2  31.1   ISTATAPO2  59*   ISTATATCO2  19*   RIUHCIL1PHW  90*   ISTATARTHCO3  18.4   ISTATARTBE  -6*   ISTATTEMP  100.8 F   ISTATFIO2  28   ISTATSPEC  Arterial   ISTATAPHTC  7.363*   PAYGDMIC1UA  64       HemoDynamics:  Pulse: (!) 153, Heart Rate (Monitored): (!) 152 Blood Pressure: 112/55, Arterial BP: 101/64, NIBP: 123/51 CVP (mm Hg): 5 MM HG    Neuro:      Fluids:    Date 02/13/19 0700 - 02/14/19 0659   Shift 3773-6993 2603-2869 7473-5863 24 Hour Total   I  N  T  A  K  E   I.V. 4347.7   4347.7      Crystalloid Intake 500   500      Vasopressin Volume 72   72      Phenylephrine Volume 900   900      Norepinephrine Volume 208.6   208.6      Insulin Volume 67.1   67.1      IV Piggyback Volume (IV Piggyback) 100   100      IV Volume (IV Maintenance) 2500   2500    IV Piggyback 71.7   71.7      Volume (mL) (potassium chloride (KCL) ivpb 10 mEq) 71.7   71.7    Shift Total 4419.4   4419.4   O  U  T  P  U  T   Urine 440   440      Output (mL) " (Urethral Catheter Temperature probe) 440   440    Stool          Number of Times Stooled 0 x   0 x    Blood 5   5      Est. Blood Loss (mL) 5   5    Shift Total 445   445   NET 3974.4   3974.4        Intake/Output Summary (Last 24 hours) at 02/13/19 1447  Last data filed at 02/13/19 1400   Gross per 24 hour   Intake         74864.45 ml   Output             1670 ml   Net         60249.45 ml       Weight: 64.3 kg (141 lb 12.1 oz)  Body mass index is 25.93 kg/m².    Recent Labs      02/12/19   1437  02/12/19   1637   02/12/19   2148  02/12/19   2249  02/13/19   0353  02/13/19   0805   SODIUM  128*   --    < >   --   140  144  142   POTASSIUM  3.8   --    < >   --   4.4  4.7  4.7   CHLORIDE  85*   --    < >   --   99  104  106   CO2  15*   --    < >   --   20  22  18*   BUN  102*   --    < >   --   94*  82*  84*   CREATININE  4.24*   --    < >   --   3.22*  2.70*  2.63*   MAGNESIUM  2.3  1.9   --   2.6*   --   2.0   --    PHOSPHORUS  6.9*   --    --   4.1   --   3.4   --    CALCIUM  8.4*   --    < >   --   7.8*  7.6*  7.4*    < > = values in this interval not displayed.       GI/Nutrition:  Recent Labs      02/12/19   1801  02/12/19   1945   02/12/19   2249   02/13/19   0353   02/13/19   0559  02/13/19   0712  02/13/19   0805   ALTSGPT  10  8   --    --    --   7   --    --    --    --    ASTSGOT  15  14   --    --    --   18   --    --    --    --    ALKPHOSPHAT  127*  106*   --    --    --   86   --    --    --    --    TBILIRUBIN  0.6  0.9   --    --    --   0.7   --    --    --    --    LIPASE   --    --    --   586*   --    --    --    --    --    --    GLUCOSE  1255*  981*   < >  862*   < >  700*   < >  756*  669*  644*    < > = values in this interval not displayed.       Heme:  Recent Labs      02/12/19   1105  02/12/19   2048  02/13/19   0353   RBC  5.23  3.99*  4.10*   HEMOGLOBIN  13.5  10.3*  10.6*   HEMATOCRIT  51.5*  32.1*  32.5*   PLATELETCT  331  214  206   PROTHROMBTM  15.7*   --    --    APTT  27.1    --    --    INR  1.24*   --    --        Infectious Disease:  Monitored Temp 2  Av.1 °C (98.7 °F)  Min: 33.7 °C (92.7 °F)  Max: 38.5 °C (101.3 °F)  Temp  Av.5 °C (101.3 °F)  Min: 38.5 °C (101.3 °F)  Max: 38.5 °C (101.3 °F)  Recent Labs      19   1105   19   1801  19   1945  19   0353   WBC  10.5   --    --    --   4.7*  4.9   NEUTSPOLYS  73.40*   --    --    --    --   71.80   LYMPHOCYTES  16.40*   --    --    --    --   11.90*   MONOCYTES  8.90   --    --    --    --   1.80   EOSINOPHILS  0.10   --    --    --    --   0.00   BASOPHILS  0.40   --    --    --    --   1.80   ASTSGOT  14   < >  15  14   --   18   ALTSGPT  12   < >  10  8   --   7   ALKPHOSPHAT  169*   < >  127*  106*   --   86   TBILIRUBIN  1.2   < >  0.6  0.9   --   0.7    < > = values in this interval not displayed.       Meds:  • metroNIDAZOLE (FLAGYL) IV  500 mg 500 mg (19 1423)   • fentaNYL   mcg     • vasopressin (PITRESSIN) infusion  0.03 Units/min 0.03 Units/min (19 0554)   • NORepinephrine (LEVOPHED) infusion  0-30 mcg/min 14 mcg/min (19 1341)   • Phenylephrine infusion  1-300 mcg/min 300 mcg/min (19 1356)   • insulin infusion (for DKA ONLY)  3 Units/hr 7 Units/hr (19 1433)   • senna-docusate  2 Tab      And   • polyethylene glycol/lytes  1 Packet      And   • magnesium hydroxide  30 mL      And   • bisacodyl  10 mg     • Respiratory Care per Protocol       • dextrose 10% and 0.45% NaCl   Stopped (19 1445)   • MD ALERT-PHARMACY TO CONSULT  1 Each     • potassium phosphate ivpb  30 mmol     • LR   250 mL/hr at 19 1224   • D5LR   Stopped (19 1445)   • cefTRIAXone (ROCEPHIN) IV  1 g Stopped (19 0656)   • heparin  5,000 Units     • Adult DKA potassium(K+) replacement scale  1 Each          Procedures:  Right IJ:   Exploratory laparotomy:     Imaging:  CT-ABDOMEN-PELVIS W/O   Final Result         1. Findings highly suggestive of  bowel ischemia (dilated, thickened small bowel loops, small bowel pneumatosis and portal venous gas). Small bowel dilatation suggests that there may be obstruction, with transition somewhere in the ileum.      2. Small pockets of extraluminal area adjacent to the proximal colon may represent free air or air in the mesenteric veins.      3. Small volume ascites.      Findings were discussed with LORENZO MONROY JR on 2/13/2019 1:13 AM.      DX-CHEST-PORTABLE (1 VIEW)   Final Result      1.  Supportive tubing as described above.   2.  Mild worsening hypoinflation.      DX-CHEST-PORTABLE (1 VIEW)   Final Result      Right central line projects over the SVC. No pneumothorax.         CT-HEAD W/O   Final Result      No acute intracranial abnormality is identified.      DX-CHEST-PORTABLE (1 VIEW)   Final Result      No acute cardiopulmonary process is seen.      Atherosclerotic plaque.          Problem and Plan:      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 fib   Assessment & Plan    Noted to have acute abdomen overnight CT showed ischemic bowel  Exploratory laparotomy shows infarcted ileum and ascending colon not amenable to resection  CODE STATUS changed to DNI DNR  Comfort care measures after family has been contacted or after ethics evaluation  Continue antibiotics: Rocephin and Flagyl  IV fluids       DKA (diabetic ketoacidoses) (HCC)- (present on admission)   Assessment & Plan    Noted to have blood glucose in the thousand 1700s,  Patient likely having hyperosmolar nonketotic state  Lactic acidosis  DKA protocol started  Persistent lactic acidosis, blood glucose difficult to control likely in setting of ongoing infarcted bowel  - Continue DKA protocol for now  -Effort to contact family and change patient's management to comfort care  -Treatment of abdominal infection       Septic shock (HCC) likely in setting of ischemic bowel   Assessment & Plan    This is severe sepsis with the following associated  acute organ dysfunction(s): acute kidney failure.  Currently on 3 pressors  Antibiotics  IV fluids  Will likely transition to comfort care in the next 24 hours     Paroxysmal atrial fibrillation (HCC)   Assessment & Plan    Rate controlled, on Xarelto.  We will resume when appropriate     Acute cystitis with hematuria   Assessment & Plan    DT urine  Currently on ceftriaxone which will cover any UTI     Hyponatremia   Assessment & Plan    Resolved     Acute renal failure with acute tubular necrosis superimposed on stage 3 chronic kidney disease (HCC)   Assessment & Plan    Creatinine 2.63, baseline unknown  GFR of 19  Likely in setting of DKA  -IV hydration  - Will avoid nephrotoxic medications for now     Hypertension- (present on admission)   Assessment & Plan    Patient to be closely monitored for volume overload and to start IV metoprolol/hydralazine if systolic blood pressure greater than 160.       Lactic acidosis   Assessment & Plan    Lactic acid elevated at 4.8,   Persistent lactic acidosis, not likely to resolve given ongoing infected bowel  Continue to trend for now     Metabolic encephalopathy- (present on admission)   Assessment & Plan    In setting of DKA  Treatment of DKA       History of DVT (deep vein thrombosis)- (present on admission)   Assessment & Plan    Has a history of DVT, will continue Xarelto when appropriate         DISPO: icu    CODE STATUS: DNI DNR    Quality Measures:  Walls Catheter: Yes  DVT Prophylaxis: Heparin  Antibiotics: Ceftriaxone and Flagyl  Lines: Right IJ, PIV

## 2019-02-13 NOTE — PROGRESS NOTES
Dr. Loo to bedside due to SBP in 60s, patient appearing to deteriorate.  Continuing to monitor, started on Vasopressin.

## 2019-02-13 NOTE — PROGRESS NOTES
Keith from Lab called with critical result of glucose 1659 at 1625. Critical lab result read back to Lab. Dr. Lambert notified and at bedside.

## 2019-02-13 NOTE — OR NURSING
Patient non verbal in pre-op. ICU nurse at bedside, gave report to anesthesia. Patient identity verified. 2 MD's signed consents due to necessity for emergent surgery.

## 2019-02-13 NOTE — CARE PLAN
Problem: Venous Thromboembolism (VTW)/Deep Vein Thrombosis (DVT) Prevention:  Goal: Patient will participate in Venous Thrombosis (VTE)/Deep Vein Thrombosis (DVT)Prevention Measures  Outcome: PROGRESSING AS EXPECTED  SCDs in place; unfractionated Heparin on MAR    Problem: Skin Integrity  Goal: Risk for impaired skin integrity will decrease  Outcome: PROGRESSING AS EXPECTED  Q2 hour turns; moving medical devices

## 2019-02-13 NOTE — DISCHARGE PLANNING
Anticipated Discharge Disposition: unknown    Action: NOK     Informed by attending physician, Dr. Lambert that he needed to locate the pt's NOK as soon as possible.    TC to pt's emergency contact, Sister Telma Matson [phone]. Prompt indicate number had been disconnected    Attempted other emergency contact, Ki Couch [phone]. Left message informing him of the importance to call this LSW back.     Left TC and EM messages for  Supervisor's Adal requesting a search for the pt's NOK.    TC to Pt's PCP's office, Dr. Tiago Higgins [phone]. Answering service reports there is no one in the office until 08:00. Received back-line number of, [phone]. Will attempt to call after 08:00.    TC to Dr. Higgins's office [phone]. There emergency contact number is the same as the pt current medical chart.     Barriers to Discharge: none    Plan: Continue attempt to locate NOK.

## 2019-02-13 NOTE — ASSESSMENT & PLAN NOTE
Noted to have acute abdomen overnight CT showed ischemic bowel  Exploratory laparotomy shows infarcted ileum and ascending colon not amenable to resection  CODE STATUS changed to DNI DNR  Comfort care measures after family has been contacted or after ethics evaluation  Continue antibiotics: Rocephin and Flagyl  IV fluids  Surgery signed off

## 2019-02-13 NOTE — ASSESSMENT & PLAN NOTE
This is severe sepsis with the following associated acute organ dysfunction(s): acute kidney failure.  Currently on 3 pressors  Antibiotics  IV fluids  Will likely transition to comfort care in the next 24 hours

## 2019-02-13 NOTE — PROGRESS NOTES
Dr. Loo updated of abdominal tenderness upon palpation.  Dr. Loo to bedside, performed bedside ultrasound.  Labs and STAT CT ordered.

## 2019-02-13 NOTE — PROGRESS NOTES
Critical Care Progress Note    Date of admission  2/12/2019    Chief Complaint  58 y.o. female admitted 2/12/2019 with severe hyperglycemia/DKA associated with severe dehydration/hypotension    Hospital Course    58 y.o. female who presented 2/12/2019 with past medical history significant for diabetes and hypertension who was found down by unknown persons for approximately 3 days confused.  She was found to have a glucose greater than 600 and patient with rapid ventricular response.  In the emergency department she was found to be in DKA and with IV fluids her heart rate started to improve and she converted back into normal sinus rhythm.  She is unable to provide any history at this time given her altered mentation (Admit HPI from Dr Gonda).     Interval Problem Update  Reviewed last 24 hour events:    Surgical consult this AM noted - nonoperative - suggests CC  CT ABD/pelvis reviewed - suggestive of isch bowel/obstruction    Examined patient very early a.m. with my associate who was on my call   Patient was following commands although was definitely encephalopathic  Renal function mildly improved, urine output picking up with fluid resuscitation although very positive over 10 L  Blood sugar down from 1700-700 on insulin drip  Acidosis actually improved although lactic acid still elevated at 3.3  Patient ventilating and oxygenating acceptably by blood gas with a pH of 7.36 on 4 L nasal cannula  No family available, patient not appropriate to obtain consent for surgery or to change CODE STATUS  Patient on 3 pressors, norepinephrine/vasopressin/Ashish-Synephrine  Reviewed case with Dr. Escobar by phone at great length  We both agreed prognosis very poor and that she probably had ischemic bowel by CT which I reviewed with him  Patient only 58 and primary comorbidity diabetes  He agreed to do a ex lap and have significant ischemic bowel present close her up and bring her back to the ICU while still trying to find  family and then offer comfort care    Dr. Escobar subsequently called me by phone from the operating room  Half the small intestine and half the large intestine had severe ischemia and was clearly dead  He felt the extent of ischemia was terminal and felt it inappropriate to proceed beyond just inspection and I agreed  Case reviewed at length with Dr. beard in regards to CODE STATUS and we both agree she should be a DNR  Case reviewed with resident and ICU team at length and all agree with Dr. Escobar and my decision    Case management has been unsuccessful and tracking down family or POA  Case management/ subsequently reported the patient's name and also secured number may be wrong and further investigation is ongoing  Nursing staff received a call from someone at identifying himself as the patient's ?   attempted to contact him, story from the ER was that he was some when she was the caregiver for not the ?    Patient seen postoperatively when she arrived back in the ICU  Patient on triple pressors still  Urine output marginal  Patient came much more tachycardic  Patient less responsive but on the ventilator and just had received some anesthesia obviously    Patient starting to grimace and experienced pain but not been able to respond appropriately  Fentanyl infusion initiated for comfort/analgesia  Reviewed case again with surgery and team  We will not escalate care i.e. not add bicarbonate drip, transfuse blood products, escalate pressors or initiate additional care such as dialysis  Care appears like it is heading towards futility, ethics consult requested earlier in the day with assistance social workers pending at this time and family/POA are even the gentleman who called earlier and still not been reached by  or case management    Patient become more tachycardic, appears to be sinus, narcotic infusion is now started hopefully that will help    Review of  Systems  Review of Systems   Unable to perform ROS: Intubated        Vital Signs for last 24 hours   Temp:  [38.5 °C (101.3 °F)] 38.5 °C (101.3 °F)  Pulse:  [] 157  Resp:  [12-46] 30  BP: (112)/(55) 112/55  SpO2:  [74 %-100 %] 100 %    Hemodynamic parameters for last 24 hours  CVP:  [0 MM HG-5 MM HG] 5 MM HG    Respiratory Information for the last 24 hours  Treadwell Vent Mode: APVCMV  Rate (breaths/min): 20  Vt Target (mL): 310  PEEP/CPAP: 8  FiO2: 40  P MEAN: 11  Control VTE (exp VT): 417    Physical Exam   Physical Exam   Constitutional: She appears lethargic. She appears distressed.   Appears older than stated age   HENT:   Head: Normocephalic and atraumatic.   Eyes: Pupils are equal, round, and reactive to light. No scleral icterus.   Neck: Neck supple. Normal carotid pulses and no JVD present.   Cardiovascular: Regular rhythm and intact distal pulses.   No extrasystoles are present. Tachycardia present.  Exam reveals no gallop.    No murmur heard.  Pulmonary/Chest: No accessory muscle usage. No respiratory distress. She has no wheezes. She has rhonchi. She has no rales.   Abdominal: She exhibits distension. She exhibits no mass. There is no hepatosplenomegaly. There is generalized tenderness. There is rebound and guarding. There is no rigidity and no CVA tenderness.   Musculoskeletal: She exhibits no edema.   Neurological: She appears lethargic. She displays normal reflexes. No cranial nerve deficit or sensory deficit. She exhibits normal muscle tone. GCS eye subscore is 4. GCS verbal subscore is 1. GCS motor subscore is 6.   Skin: Skin is warm and dry. No cyanosis. Nails show no clubbing.   Some mottling seen in the lower extremities early in a.m.   Psychiatric:   Unable to assess       Medications  Current Facility-Administered Medications   Medication Dose Route Frequency Provider Last Rate Last Dose   • metroNIDAZOLE (FLAGYL) IVPB 500 mg  500 mg Intravenous Q8HRS Zev Loo Jr., D.O.   Stopped at  02/13/19 1523   • fentaNYL (SUBLIMAZE) injection  mcg   mcg Intravenous Q HOUR PRN Zev Loo Jr., D.OStevie   100 mcg at 02/13/19 1644   • vasopressin (VASOSTRICT) 20 Units in  mL Infusion  0.03 Units/min Intravenous Continuous Zev Loo Jr., D.O. 9 mL/hr at 02/13/19 1609 0.03 Units/min at 02/13/19 1609   • norepinephrine (LEVOPHED) 8 mg in  mL Infusion  0-30 mcg/min Intravenous Continuous Sam Lambert M.D. 26.3 mL/hr at 02/13/19 1757 14 mcg/min at 02/13/19 1757   • phenylephrine (GARY-SYNEPHRINE) 40,000 mcg in  mL Infusion  1-300 mcg/min Intravenous Continuous Sam Lambert M.D. 112.5 mL/hr at 02/13/19 1836 300 mcg/min at 02/13/19 1836   • ondansetron (ZOFRAN) syringe/vial injection 4 mg  4 mg Intravenous Q4HRS PRN Sam Lambert M.D.       • fentaNYL (SUBLIMAZE) 50 mcg/mL in 50mL (Continuous Infusion)   Intravenous Continuous Sam Lambert M.D. 2 mL/hr at 02/13/19 1809 100 mcg/hr at 02/13/19 1809   • insulin regular human (HUMULIN/NOVOLIN R) 62.5 Units in  mL Infusion for DKA  3 Units/hr Intravenous Continuous Ike Prado M.D. 36 mL/hr at 02/13/19 1908 9 Units/hr at 02/13/19 1908   • senna-docusate (PERICOLACE or SENOKOT S) 8.6-50 MG per tablet 2 Tab  2 Tab Oral BID Ike Prado M.D.   Stopped at 02/12/19 1800    And   • polyethylene glycol/lytes (MIRALAX) PACKET 1 Packet  1 Packet Oral QDAY PRN Ike Prado M.D.        And   • magnesium hydroxide (MILK OF MAGNESIA) suspension 30 mL  30 mL Oral QDAY PRN Ike Prado M.D.        And   • bisacodyl (DULCOLAX) suppository 10 mg  10 mg Rectal QDAY PRN Ike Prado M.D.       • Respiratory Care per Protocol   Nebulization Continuous RT Ike Prado M.D.       • dextrose 10% and 0.45% NaCl infusion   Intravenous Continuous Ike Prado M.D.   Stopped at 02/12/19 1445   • MD ALERT-PHARMACY TO CONSULT FOR DKA MONITORING 1 Each   1 Each Other PRN Ike Prado M.D.       • potassium phosphates 30 mmol in  mL ivpb  30 mmol Intravenous Once PRN Ike Prado M.D.       • lactated ringers infusion   Intravenous Continuous Sam Lambert M.D. 250 mL/hr at 02/13/19 1624     • D5LR infusion   Intravenous Continuous Ike Prado M.D.   Stopped at 02/12/19 1445   • cefTRIAXone (ROCEPHIN) 1 g in  mL IVPB  1 g Intravenous Q24HRS Jeremy M Gonda, M.D.   Stopped at 02/13/19 0656   • heparin injection 5,000 Units  5,000 Units Subcutaneous Q8HRS Ike Prado M.D.   5,000 Units at 02/13/19 1423       Fluids    Intake/Output Summary (Last 24 hours) at 02/13/19 2009  Last data filed at 02/13/19 1800   Gross per 24 hour   Intake         82908.22 ml   Output             1355 ml   Net         30487.22 ml       Laboratory  Recent Labs      02/13/19   0534  02/13/19   1609   ISTATAPH  7.381*  7.288*   ISTATAPCO2  31.1  34.7   ISTATAPO2  59*  110*   ISTATATCO2  19*  18*   [id number]  90*  98   ISTATARTHCO3  18.4  16.6*   ISTATARTBE  -6*  -9*   ISTATTEMP  100.8 F  102.0 F   ISTATFIO2  28  70   ISTATSPEC  Arterial  Arterial   ISTATAPHTC  7.363*  7.261*   [id number]  64  122*     Recent Labs      02/12/19   1105  02/12/19   1437   CPKTOTAL  135  128   TROPONINI  0.04   --      Recent Labs      02/12/19   2148   02/13/19   0353  02/13/19   0805  02/13/19   1245  02/13/19   1800   SODIUM   --    < >  144  142  143  145   POTASSIUM   --    < >  4.7  4.7  5.1  4.8   CHLORIDE   --    < >  104  106  110  115*   CO2   --    < >  22  18*  20  21   BUN   --    < >  82*  84*  86*  85*   CREATININE   --    < >  2.70*  2.63*  2.49*  2.41*   MAGNESIUM  2.6*   --   2.0   --   1.9   --    PHOSPHORUS  4.1   --   3.4   --   3.9   --    CALCIUM   --    < >  7.6*  7.4*  7.2*  7.4*    < > = values in this interval not displayed.     Recent Labs      02/12/19   1801  02/12/19   1945   02/12/19   2249   02/13/19   0358    02/13/19   0805  02/13/19   1245  02/13/19   1800   ALTSGPT  10  8   --    --    --   7   --    --    --    --    ASTSGOT  15  14   --    --    --   18   --    --    --    --    ALKPHOSPHAT  127*  106*   --    --    --   86   --    --    --    --    TBILIRUBIN  0.6  0.9   --    --    --   0.7   --    --    --    --    LIPASE   --    --    --   586*   --    --    --    --    --    --    GLUCOSE  1255*  981*   < >  862*   < >  700*   < >  644*  623*  432*    < > = values in this interval not displayed.     Recent Labs      02/12/19   1105 02/12/19   1801 02/12/19   1945 02/12/19 2048 02/13/19   0353   WBC  10.5   --    --    --   4.7*  4.9   NEUTSPOLYS  73.40*   --    --    --    --   71.80   LYMPHOCYTES  16.40*   --    --    --    --   11.90*   MONOCYTES  8.90   --    --    --    --   1.80   EOSINOPHILS  0.10   --    --    --    --   0.00   BASOPHILS  0.40   --    --    --    --   1.80   ASTSGOT  14   < >  15  14   --   18   ALTSGPT  12   < >  10  8   --   7   ALKPHOSPHAT  169*   < >  127*  106*   --   86   TBILIRUBIN  1.2   < >  0.6  0.9   --   0.7    < > = values in this interval not displayed.     Recent Labs      02/12/19   1105 02/12/19 2048 02/13/19   0353   RBC  5.23  3.99*  4.10*   HEMOGLOBIN  13.5  10.3*  10.6*   HEMATOCRIT  51.5*  32.1*  32.5*   PLATELETCT  331  214  206   PROTHROMBTM  15.7*   --    --    APTT  27.1   --    --    INR  1.24*   --    --        Imaging  X-Ray:  I have personally reviewed the images and compared with prior images.  EKG:  I have personally reviewed the images and compared with prior images.  CT:    Reviewed    Assessment/Plan  * Septic shock (HCC) likely in setting of ischemic bowel   Assessment & Plan    This is severe sepsis with the following associated acute organ dysfunction(s): acute kidney failure, acute respiratory failure, metabolic/septic encephalopathy.    Etiology ischemic bowel by exam and by CT and subsequently by ex lap  Cultures pending  Lactic acid  level initially had been trending down although still elevated  Fluid resuscitation ongoing via sepsis protocols  Titration of pressors ongoing, Ashish-Synephrine/norepinephrine/vasopressin  Cortisol level nearly 100, will hold on IV steroids at this time  IV antibiotics: Ceftriaxone/metronidazole    Prognosis extremely poor going into the OR, postop prognosis approaching futility     Acute respiratory failure with hypoxia (HCC)   Assessment & Plan    Intubated in OR 2/13 for ex lap  Had been developing progressive hypoxemia with fluid resuscitation preop, monitoring for ARDS  Serial ABG/chest x-ray/ventilator mechanics review  Not appropriate for liberation trial  RT protocols  Ventilator bundle     DKA (diabetic ketoacidoses) (HCC)- (present on admission)   Assessment & Plan    Unclear cause, most likely secondary to noncompliance given historical reasons versus UTI versus other  DKA protocol with ongoing insulin drip  Aggressive fluid resuscitation ongoing  N.p.o.  Close monitoring of glucose as well as electrolytes, anion gap, bicarb  Hypoglycemia protocols     Paroxysmal atrial fibrillation (HCC)   Assessment & Plan    Presumed to be new onset  Rate controlled with fluids  Hold Xarelto, just provide prophylactic subcu heparin  Consider beta-blocker or calcium channel blocker if he goes into RVR again as blood pressure will allow  Initially trying to switch from norepinephrine to Ashish-Synephrine/vasopressin to help with blood pressure without exacerbating tachycardia  IV digoxin or even IV amiodarone to help with heart rate if blood pressure remains an issue     Acute cystitis with hematuria   Assessment & Plan    Empiric antibiotics, follow-up on urine culture/blood culture     Hyponatremia   Assessment & Plan    Pseudohyponatremia, corrected sodium on admit 166  Monitor with fluid resuscitation and glucose control     Acute renal failure with acute tubular necrosis superimposed on stage 3 chronic kidney disease  (HCC)   Assessment & Plan    Secondary to ATN, dehydration  Query chronic kidney disease related to diabetes/hypertension  Aggressive fluid resuscitation ongoing  Monitor kidney function and urine output closely, creatinine actually improved today versus yesterday a.m. although urine output marginal  Monitor electrolytes and replace as needed  Patient not a good candidate for dialysis  With operative findings would not escalate care to renal replacement therapy     Metabolic encephalopathy- (present on admission)   Assessment & Plan    Secondary to hyperosmolar state, acute kidney injury and diabetic ketoacidosis initially  Subsequently felt secondary to sepsis syndrome as well  Avoid sedatives as tolerated  Every 4 hours neuro checks  Aspiration precautions     Hyperosmolarity due to secondary diabetes mellitus (HCC)- (present on admission)   Assessment & Plan    IV fluid resuscitation  Corrected serum sodium with initial blood work 166  Patient profoundly dehydrated on arrival, water deficit greater > 6 L     Lactic acidosis   Assessment & Plan    Secondary to DKA/sepsis  Monitor with fluid resuscitation       Depression- (present on admission)   Assessment & Plan    Resume Cymbalta when clinically appropriate     History of DVT (deep vein thrombosis)- (present on admission)   Assessment & Plan    Prophylactic subcu heparin  Monitor     Hyperlipidemia- (present on admission)   Assessment & Plan    Continue atorvastatin when clinically appropriate     Hypertension- (present on admission)   Assessment & Plan    History of hypertension, hypertensive now, holding all medicines remain low blood pressure    Resume outpatient antihypertensives once able to safely swallow          VTE:  Heparin  Ulcer: H2 Antagonist  Lines: Central Line  Ongoing indication addressed, Arterial Line  Ongoing indication addressed and Walls Catheter  Ongoing indication addressed    I have performed a physical exam and reviewed and updated ROS  and Plan today (2/13/2019). In review of yesterday's note (2/12/2019), there are no changes except as documented above.     Discussed patient condition and risk of morbidity and/or mortality with RN, RT, Pharmacy, , , Code status disscussed, Charge nurse / hot rounds, general surgery and Ethics MD     The patient remains critically ill.  Critical care time = 125 minutes in directly providing and coordinating critical care and extensive data review.  No time overlap and excludes procedures.

## 2019-02-13 NOTE — PROGRESS NOTES
Sincere from Lab called with critical result of blood glucose of 1255 at 1938. Critical lab result read back to Sincere.   Dr. Loo notified of critical lab result at 1945.  Critical lab result read back by Dr. Loo.  Discussed protocol with Dr. Loo due to delay in labs.  Would like to continue with protocol, ordered insulin titration to 1 unit/hr.

## 2019-02-13 NOTE — PROGRESS NOTES
Pulmonary/Critical Care Medicine   Progress Note    Date of service: 2/12/2019  Time: 11:00 PM    Called to bedside by RN for worsening mental status and abdominal pain.  Patient clearly peritoneal on exam, lactic acidosis not clearing concerning for intra-abdominal catastrophe.  Stat CT abdomen and lipase ordered. BG rapidly correcting, frequent titrations to insulin infusion off of protocol needed.    01:15 spoke with Dr. Tovar regarding CT findings concerning for ischemic bowel.  Stat surgical consult placed.  Continued aggressive fluid and medical resuscitation continuing.  No next of kin able to be reached by social work, will continue to work on this.  Patient currently nonverbal.    03:00 Spoke with Dr. Escobar regarding prognosis and treatment plan, poor surgical candidate from his standpoint, comfort care recommended.  Still attempting to reach next of kin to guide treatment plan.    05:30 Acidosis improving with medical management however pressor support increasing, patient more responsive at this time.  Lactic acidosis continues.    07:00 Discussed with Dr. Lambert regarding patient condition and continued improvement despite intra-abdominal process. Will re-consult surgery to reconsider surgical exploration as a possible life-saving measure. Care turned over to Dr. Lambert    I spent 75 min in reviewing the patient's condition, physical examination, laboratory and imaging data, prior documentation, in discussion with RN, RT, [facility] resident, surgery, pharmacy, and in formulating an assessment/plan.    Critical Care time: 85 min. No time overlap, procedures not included in time.  [id number] [phone]

## 2019-02-13 NOTE — OP REPORT
DATE OF SERVICE:  02/13/2019    PREOPERATIVE DIAGNOSIS:  Ischemic and infarcted bowel.    POSTOPERATIVE DIAGNOSIS:  Infarcted ileum and ascending colon.    PROCEDURE PERFORMED:  Exploratory laparotomy.    ANESTHESIA:  General endotracheal.    ANESTHESIOLOGIST:  iMke Melendez MD    SURGEON:  Berhane Escobar MD    FIRST ASSISTANT:  Dr. Herrmann.    INDICATIONS:  A 58-year-old diabetic and diabetic ketoacidosis, found to have   evidence of an acute abdomen last night with evidence of pneumatosis of the   bowel, underwent exploratory laparotomy for possible bowel resection.    OPERATIVE FINDINGS:  Entire ileum and ascending colon was dead.  Given the   patient's multiple comorbidities, I felt that she was not a survivable   candidate for extensive resection.    OPERATIVE NOTE:  The patient was taken to the operating room, placed in the   supine position, given general endotracheal anesthesia.  Once properly   anesthetized, was prepped and draped in the usual sterile fashion.  Vertical   midline incision was made and carried down through the skin and subcutaneous   tissue.  Upon entering the abdominal cavity, a large amount of reddish brown   ascitic fluid was encountered.  This was removed.  The abdomen was opened   extending the incision and the abdomen was inspected.  The bowel was run from   the terminal ileum down to the ileocecal valve and the colon was also run.    Findings were that the entire ileum was necrotic and infarcted as was the   ascending colon.  The proximal bowel was somewhat edematous and questionable   whether it would also survive long-term; therefore, a decision was made not to   do any resection given the patient's multiple comorbidities.  This was   discussed intraoperatively with her critical care team, Dr. Guevara, and we   elected to place her on comfort care.  Her abdomen was then closed with a   running double stranded PDS.  Skin was closed with staples.  She was taken   back to the ICU.   Lap, sponge and instrument counts were correct.  There were   no apparent complications.       ____________________________________     MD BRENDA Conde / MILLIE    DD:  02/13/2019 10:37:04  DT:  02/13/2019 10:45:48    D#:  [id number]  Job#:  [id number]

## 2019-02-13 NOTE — ASSESSMENT & PLAN NOTE
Lactic acid elevated at 4.8,   Persistent lactic acidosis, not likely to resolve given ongoing infected bowel  No need to trend

## 2019-02-13 NOTE — ASSESSMENT & PLAN NOTE
Creatinine 2.09 baseline unknown  Likely in setting of DKA  -IV hydration  - Will avoid nephrotoxic medications for now

## 2019-02-13 NOTE — PROGRESS NOTES
Surgical Progress Note    Author: Berhane Escobar Date & Time created: 2019   7:21 AM     Interval Events:    Review of Systems   Unable to perform ROS: Medical condition     Hemodynamics:  Temp (24hrs), Av.5 °C (97.7 °F), Min:36.5 °C (97.7 °F), Max:36.5 °C (97.7 °F)  Temperature: 36.5 °C (97.7 °F), Monitored Temp: 38.4 °C (101.1 °F)  Pulse  Av.9  Min: 67  Max: 157Heart Rate (Monitored): (!) 156  NIBP: 112/84  CVP (mm Hg): (!) 0 MM HG  Respiratory:    Respiration: (!) 24, Pulse Oximetry: 95 %, O2 Daily Delivery Respiratory : Room Air with O2 Available        RUL Breath Sounds: Clear, RML Breath Sounds: Clear, RLL Breath Sounds: Diminished, EDWIN Breath Sounds: Clear, LLL Breath Sounds: Diminished  Neuro:  GCS Total [place] Coma Score: 10     Fluids:    Intake/Output Summary (Last 24 hours) at 19 0721  Last data filed at 19 0600   Gross per 24 hour   Intake          67817.1 ml   Output             1225 ml   Net          80916.1 ml     Weight: 64.3 kg (141 lb 12.1 oz)  Current Diet Order   Procedures   • Diet NPO     Physical Exam   Abdominal: There is tenderness. There is rebound and guarding.     Labs:  Recent Results (from the past 24 hour(s))   CBC WITH DIFFERENTIAL    Collection Time: 19 11:05 AM   Result Value Ref Range    WBC 10.5 4.8 - 10.8 K/uL    RBC 5.23 4.20 - 5.40 M/uL    Hemoglobin 13.5 12.0 - 16.0 g/dL    Hematocrit 51.5 (H) 37.0 - 47.0 %    MCV 98.5 (H) 81.4 - 97.8 fL    MCH 25.8 (L) 27.0 - 33.0 pg    MCHC 26.2 (L) 33.6 - 35.0 g/dL    RDW 51.1 (H) 35.9 - 50.0 fL    Platelet Count 331 164 - 446 K/uL    MPV 12.8 9.0 - 12.9 fL    Neutrophils-Polys 73.40 (H) 44.00 - 72.00 %    Lymphocytes 16.40 (L) 22.00 - 41.00 %    Monocytes 8.90 0.00 - 13.40 %    Eosinophils 0.10 0.00 - 6.90 %    Basophils 0.40 0.00 - 1.80 %    Immature Granulocytes 0.80 0.00 - 0.90 %    Nucleated RBC 0.00 /100 WBC    Neutrophils (Absolute) 7.72 (H) 2.00 - 7.15 K/uL    Lymphs (Absolute) 1.72 1.00 - 4.80 K/uL     Monos (Absolute) 0.94 (H) 0.00 - 0.85 K/uL    Eos (Absolute) 0.01 0.00 - 0.51 K/uL    Baso (Absolute) 0.04 0.00 - 0.12 K/uL    Immature Granulocytes (abs) 0.08 0.00 - 0.11 K/uL    NRBC (Absolute) 0.00 K/uL    Hypochromia 1+     Anisocytosis 1+     Microcytosis 1+    COMP METABOLIC PANEL    Collection Time: 02/12/19 11:05 AM   Result Value Ref Range    Sodium 126 (L) 135 - 145 mmol/L    Potassium 4.8 3.6 - 5.5 mmol/L    Chloride 72 (L) 96 - 112 mmol/L    Co2 14 (L) 20 - 33 mmol/L    Anion Gap 40.0 (H) 0.0 - 11.9    Glucose 1771 (HH) 65 - 99 mg/dL    Bun 113 (HH) 8 - 22 mg/dL    Creatinine 4.54 (H) 0.50 - 1.40 mg/dL    Calcium 9.1 8.5 - 10.5 mg/dL    AST(SGOT) 14 12 - 45 U/L    ALT(SGPT) 12 2 - 50 U/L    Alkaline Phosphatase 169 (H) 30 - 99 U/L    Total Bilirubin 1.2 0.1 - 1.5 mg/dL    Albumin 3.8 3.2 - 4.9 g/dL    Total Protein 7.1 6.0 - 8.2 g/dL    Globulin 3.3 1.9 - 3.5 g/dL    A-G Ratio 1.2 g/dL   HCG QUAL SERUM    Collection Time: 02/12/19 11:05 AM   Result Value Ref Range    Beta-Hcg Qualitative Serum Negative Negative   DIAGNOSTIC ALCOHOL    Collection Time: 02/12/19 11:05 AM   Result Value Ref Range    Diagnostic Alcohol 0.00 0.00 g/dL   URINE DRUG SCREEN (TRIAGE)    Collection Time: 02/12/19 11:05 AM   Result Value Ref Range    Amphetamines Urine Negative Negative    Barbiturates Negative Negative    Benzodiazepines Negative Negative    Cocaine Metabolite Negative Negative    Methadone Negative Negative    Opiates Negative Negative    Oxycodone Negative Negative    Phencyclidine -Pcp Negative Negative    Propoxyphene Negative Negative    Cannabinoid Metab Negative Negative   Lactic acid (lactate)    Collection Time: 02/12/19 11:05 AM   Result Value Ref Range    Lactic Acid 4.8 (HH) 0.5 - 2.0 mmol/L   URINALYSIS    Collection Time: 02/12/19 11:05 AM   Result Value Ref Range    Color Yellow     Character Turbid (A)     Specific Gravity 1.023 <1.035    Ph 5.0 5.0 - 8.0    Glucose >=1000 (A) Negative mg/dL     Ketones 15 (A) Negative mg/dL    Protein 30 (A) Negative mg/dL    Bilirubin Negative Negative    Urobilinogen, Urine 0.2 Negative    Nitrite Negative Negative    Leukocyte Esterase Moderate (A) Negative    Occult Blood Moderate (A) Negative    Micro Urine Req Microscopic    PERIPHERAL SMEAR REVIEW    Collection Time: 02/12/19 11:05 AM   Result Value Ref Range    Peripheral Smear Review see below    PLATELET ESTIMATE    Collection Time: 02/12/19 11:05 AM   Result Value Ref Range    Plt Estimation Normal    MORPHOLOGY    Collection Time: 02/12/19 11:05 AM   Result Value Ref Range    RBC Morphology Present    DIFFERENTIAL COMMENT    Collection Time: 02/12/19 11:05 AM   Result Value Ref Range    Comments-Diff see below    URINE MICROSCOPIC (W/UA)    Collection Time: 02/12/19 11:05 AM   Result Value Ref Range    WBC Packed (A) /hpf    RBC 5-10 (A) /hpf    Bacteria Negative None /hpf    Epithelial Cells Negative /hpf    Hyaline Cast 0-2 /lpf    Budding Yeast Present (A) Absent /hpf   TROPONIN    Collection Time: 02/12/19 11:05 AM   Result Value Ref Range    Troponin I 0.04 0.00 - 0.04 ng/mL   TSH    Collection Time: 02/12/19 11:05 AM   Result Value Ref Range    TSH 0.320 (L) 0.380 - 5.330 uIU/mL   PT/INR    Collection Time: 02/12/19 11:05 AM   Result Value Ref Range    PT 15.7 (H) 12.0 - 14.6 sec    INR 1.24 (H) 0.87 - 1.13   PTT    Collection Time: 02/12/19 11:05 AM   Result Value Ref Range    APTT 27.1 24.7 - 36.0 sec   BETA-HYDROXYBUTYRIC ACID    Collection Time: 02/12/19 11:05 AM   Result Value Ref Range    beta-Hydroxybutyric Acid >8.00 (H) 0.02 - 0.27 mmol/L   CREATINE KINASE    Collection Time: 02/12/19 11:05 AM   Result Value Ref Range    CPK Total 135 0 - 154 U/L   ESTIMATED GFR    Collection Time: 02/12/19 11:05 AM   Result Value Ref Range    GFR If  12 (A) >60 mL/min/1.73 m 2    GFR If Non African American 10 (A) >60 mL/min/1.73 m 2   OSMOLALITY SERUM    Collection Time: 02/12/19 11:05 AM    Result Value Ref Range    Osmolality Serum 425 (H) 278 - 298 mOsm/kg H2O   EKG    Collection Time: 19 11:23 AM   Result Value Ref Range    Report       [facility] Emergency Dept.    Test Date:  2019  Pt Name:    CANDICE TURNER                Department: ER  MRN:        [id number]                      Room:        02  Gender:     Female                       Technician: [id number]  :        [id number]                   Requested By:ER TRIAGE PROTOCOL  Order #:    [id number]                    Reading MD: DERICK RENAE MD    Measurements  Intervals                                Axis  Rate:       137                          P:  DE:                                      QRS:        57  QRSD:       102                          T:          62  QT:         352  QTc:        532    Interpretive Statements  ATRIAL FIBRILLATION  PROLONGED QT INTERVAL  Compared to ECG 2018 15:17:19  Prolonged QT interval now present  Sinus rhythm no longer present    Electronically Signed On 2019 14:04:59 PST by DERICK RENAE MD     VENOUS BLOOD GAS    Collection Time: 19 12:25 PM   Result Value Ref Range    Venous Bg Ph 7.15 (L) 7.31 - 7.45    Venous Bg Pco2 37.0 (L) 41.0 - 51.0 mmHg    Venous Bg Po2 33.6 25.0 - 40.0 mmHg    Venous Bg O2 Saturation 46.4 %    Venous Bg Hco3 13 (L) 24 - 28 mmol/L    Venous Bg Base Excess -15 mmol/L    Body Temp see below Centigrade   ACCU-CHEK GLUCOSE    Collection Time: 19  2:34 PM   Result Value Ref Range    Glucose - Accu-Ck >600 (HH) 65 - 99 mg/dL   Lactic acid (lactate)    Collection Time: 19  2:37 PM   Result Value Ref Range    Lactic Acid 3.1 (H) 0.5 - 2.0 mmol/L   Comp Metabolic Panel    Collection Time: 19  2:37 PM   Result Value Ref Range    Sodium 128 (L) 135 - 145 mmol/L    Potassium 3.8 3.6 - 5.5 mmol/L    Chloride 85 (L) 96 - 112 mmol/L    Co2 15 (L) 20 - 33 mmol/L    Anion Gap 28.0 (H) 0.0 - 11.9    Glucose 1659 (HH) 65 - 99  mg/dL    Bun 102 (HH) 8 - 22 mg/dL    Creatinine 4.24 (H) 0.50 - 1.40 mg/dL    Calcium 8.4 (L) 8.5 - 10.5 mg/dL    AST(SGOT) 13 12 - 45 U/L    ALT(SGPT) 12 2 - 50 U/L    Alkaline Phosphatase 134 (H) 30 - 99 U/L    Total Bilirubin 0.7 0.1 - 1.5 mg/dL    Albumin 2.9 (L) 3.2 - 4.9 g/dL    Total Protein 5.3 (L) 6.0 - 8.2 g/dL    Globulin 2.4 1.9 - 3.5 g/dL    A-G Ratio 1.2 g/dL   MAGNESIUM    Collection Time: 02/12/19  2:37 PM   Result Value Ref Range    Magnesium 2.3 1.5 - 2.5 mg/dL   PHOSPHORUS    Collection Time: 02/12/19  2:37 PM   Result Value Ref Range    Phosphorus 6.9 (H) 2.5 - 4.5 mg/dL   CREATINE KINASE    Collection Time: 02/12/19  2:37 PM   Result Value Ref Range    CPK Total 128 0 - 154 U/L   ESTIMATED GFR    Collection Time: 02/12/19  2:37 PM   Result Value Ref Range    GFR If  13 (A) >60 mL/min/1.73 m 2    GFR If Non African American 11 (A) >60 mL/min/1.73 m 2   DIAGNOSTIC ALCOHOL    Collection Time: 02/12/19  4:37 PM   Result Value Ref Range    Diagnostic Alcohol 0.00 0.00 g/dL   Salicylate    Collection Time: 02/12/19  4:37 PM   Result Value Ref Range    Salicylates, Quant. 0 (L) 15 - 25 mg/dL   ACETAMINOPHEN    Collection Time: 02/12/19  4:37 PM   Result Value Ref Range    Acetaminophen -Tylenol <10 10 - 30 ug/mL   CORTISOL    Collection Time: 02/12/19  4:37 PM   Result Value Ref Range    Cortisol 98.7 (H) 0.0 - 23.0 ug/dL   MAGNESIUM    Collection Time: 02/12/19  4:37 PM   Result Value Ref Range    Magnesium 1.9 1.5 - 2.5 mg/dL   Hemoglobin A1c    Collection Time: 02/12/19  5:30 PM   Result Value Ref Range    Glycohemoglobin 17.5 (H) 0.0 - 5.6 %    Est Avg Glucose 456 mg/dL   AMMONIA    Collection Time: 02/12/19  6:01 PM   Result Value Ref Range    Ammonia 53 (H) 11 - 45 umol/L   ACCU-CHEK GLUCOSE    Collection Time: 02/12/19  6:01 PM   Result Value Ref Range    Glucose - Accu-Ck >600 (HH) 65 - 99 mg/dL   Comp Metabolic Panel    Collection Time: 02/12/19  6:01 PM   Result Value Ref  Range    Sodium 136 135 - 145 mmol/L    Potassium 3.7 3.6 - 5.5 mmol/L    Chloride 96 96 - 112 mmol/L    Co2 18 (L) 20 - 33 mmol/L    Anion Gap 22.0 (H) 0.0 - 11.9    Glucose 1255 (HH) 65 - 99 mg/dL    Bun 95 (HH) 8 - 22 mg/dL    Creatinine 3.65 (H) 0.50 - 1.40 mg/dL    Calcium 8.1 (L) 8.5 - 10.5 mg/dL    AST(SGOT) 15 12 - 45 U/L    ALT(SGPT) 10 2 - 50 U/L    Alkaline Phosphatase 127 (H) 30 - 99 U/L    Total Bilirubin 0.6 0.1 - 1.5 mg/dL    Albumin 2.7 (L) 3.2 - 4.9 g/dL    Total Protein 4.9 (L) 6.0 - 8.2 g/dL    Globulin 2.2 1.9 - 3.5 g/dL    A-G Ratio 1.2 g/dL   ESTIMATED GFR    Collection Time: 02/12/19  6:01 PM   Result Value Ref Range    GFR If  15 (A) >60 mL/min/1.73 m 2    GFR If Non  13 (A) >60 mL/min/1.73 m 2   Comp Metabolic Panel    Collection Time: 02/12/19  7:45 PM   Result Value Ref Range    Sodium 139 135 - 145 mmol/L    Potassium 4.0 3.6 - 5.5 mmol/L    Chloride 97 96 - 112 mmol/L    Co2 20 20 - 33 mmol/L    Anion Gap 22.0 (H) 0.0 - 11.9    Glucose 981 (HH) 65 - 99 mg/dL    Bun 97 (HH) 8 - 22 mg/dL    Creatinine 3.51 (H) 0.50 - 1.40 mg/dL    Calcium 8.0 (L) 8.5 - 10.5 mg/dL    AST(SGOT) 14 12 - 45 U/L    ALT(SGPT) 8 2 - 50 U/L    Alkaline Phosphatase 106 (H) 30 - 99 U/L    Total Bilirubin 0.9 0.1 - 1.5 mg/dL    Albumin 3.7 3.2 - 4.9 g/dL    Total Protein 5.8 (L) 6.0 - 8.2 g/dL    Globulin 2.1 1.9 - 3.5 g/dL    A-G Ratio 1.8 g/dL   ESTIMATED GFR    Collection Time: 02/12/19  7:45 PM   Result Value Ref Range    GFR If  16 (A) >60 mL/min/1.73 m 2    GFR If Non  13 (A) >60 mL/min/1.73 m 2   Blood Glucose    Collection Time: 02/12/19  8:40 PM   Result Value Ref Range    Glucose 945 (HH) 65 - 99 mg/dL   LACTIC ACID    Collection Time: 02/12/19  8:40 PM   Result Value Ref Range    Lactic Acid 3.6 (H) 0.5 - 2.0 mmol/L   CBC WITHOUT DIFFERENTIAL    Collection Time: 02/12/19  8:48 PM   Result Value Ref Range    WBC 4.7 (L) 4.8 - 10.8 K/uL    RBC  3.99 (L) 4.20 - 5.40 M/uL    Hemoglobin 10.3 (L) 12.0 - 16.0 g/dL    Hematocrit 32.1 (L) 37.0 - 47.0 %    MCV 80.5 (L) 81.4 - 97.8 fL    MCH 25.8 (L) 27.0 - 33.0 pg    MCHC 32.1 (L) 33.6 - 35.0 g/dL    RDW 38.8 35.9 - 50.0 fL    Platelet Count 214 164 - 446 K/uL    MPV 12.1 9.0 - 12.9 fL   MAGNESIUM    Collection Time: 02/12/19  9:48 PM   Result Value Ref Range    Magnesium 2.6 (H) 1.5 - 2.5 mg/dL   PHOSPHORUS    Collection Time: 02/12/19  9:48 PM   Result Value Ref Range    Phosphorus 4.1 2.5 - 4.5 mg/dL   Blood Glucose    Collection Time: 02/12/19  9:48 PM   Result Value Ref Range    Glucose 993 (HH) 65 - 99 mg/dL   Basic Metabolic Panel    Collection Time: 02/12/19 10:49 PM   Result Value Ref Range    Sodium 140 135 - 145 mmol/L    Potassium 4.4 3.6 - 5.5 mmol/L    Chloride 99 96 - 112 mmol/L    Co2 20 20 - 33 mmol/L    Glucose 862 (HH) 65 - 99 mg/dL    Bun 94 (HH) 8 - 22 mg/dL    Creatinine 3.22 (H) 0.50 - 1.40 mg/dL    Calcium 7.8 (L) 8.5 - 10.5 mg/dL    Anion Gap 21.0 (H) 0.0 - 11.9   LIPASE    Collection Time: 02/12/19 10:49 PM   Result Value Ref Range    Lipase 586 (H) 11 - 82 U/L   ESTIMATED GFR    Collection Time: 02/12/19 10:49 PM   Result Value Ref Range    GFR If  18 (A) >60 mL/min/1.73 m 2    GFR If Non African American 15 (A) >60 mL/min/1.73 m 2   LACTIC ACID    Collection Time: 02/12/19 11:49 PM   Result Value Ref Range    Lactic Acid 3.6 (H) 0.5 - 2.0 mmol/L   Blood Glucose    Collection Time: 02/12/19 11:49 PM   Result Value Ref Range    Glucose 918 (HH) 65 - 99 mg/dL   ACCU-CHEK GLUCOSE    Collection Time: 02/13/19  1:05 AM   Result Value Ref Range    Glucose - Accu-Ck >600 () 65 - 99 mg/dL   Blood Glucose    Collection Time: 02/13/19  1:13 AM   Result Value Ref Range    Glucose 914 () 65 - 99 mg/dL   Blood Glucose    Collection Time: 02/13/19  2:03 AM   Result Value Ref Range    Glucose 848 () 65 - 99 mg/dL   Lipid Profile    Collection Time: 02/13/19  2:03 AM   Result Value  Ref Range    Cholesterol,Tot 51 (L) 100 - 199 mg/dL    Triglycerides 176 (H) 0 - 149 mg/dL    HDL 19 (A) >=40 mg/dL    LDL see below <100 mg/dL   Blood Glucose    Collection Time: 02/13/19  3:08 AM   Result Value Ref Range    Glucose 704 (HH) 65 - 99 mg/dL   CBC with Differential    Collection Time: 02/13/19  3:53 AM   Result Value Ref Range    WBC 4.9 4.8 - 10.8 K/uL    RBC 4.10 (L) 4.20 - 5.40 M/uL    Hemoglobin 10.6 (L) 12.0 - 16.0 g/dL    Hematocrit 32.5 (L) 37.0 - 47.0 %    MCV 79.3 (L) 81.4 - 97.8 fL    MCH 25.9 (L) 27.0 - 33.0 pg    MCHC 32.6 (L) 33.6 - 35.0 g/dL    RDW 37.9 35.9 - 50.0 fL    Platelet Count 206 164 - 446 K/uL    MPV 12.3 9.0 - 12.9 fL    Neutrophils-Polys 71.80 44.00 - 72.00 %    Lymphocytes 11.90 (L) 22.00 - 41.00 %    Monocytes 1.80 0.00 - 13.40 %    Eosinophils 0.00 0.00 - 6.90 %    Basophils 1.80 0.00 - 1.80 %    Nucleated RBC 0.00 /100 WBC    Neutrophils (Absolute) 4.10 2.00 - 7.15 K/uL    Lymphs (Absolute) 0.58 (L) 1.00 - 4.80 K/uL    Monos (Absolute) 0.09 0.00 - 0.85 K/uL    Eos (Absolute) 0.00 0.00 - 0.51 K/uL    Baso (Absolute) 0.09 0.00 - 0.12 K/uL    NRBC (Absolute) 0.00 K/uL    Anisocytosis 1+     Microcytosis 1+    Comp Metabolic Panel (CMP)    Collection Time: 02/13/19  3:53 AM   Result Value Ref Range    Sodium 144 135 - 145 mmol/L    Potassium 4.7 3.6 - 5.5 mmol/L    Chloride 104 96 - 112 mmol/L    Co2 22 20 - 33 mmol/L    Anion Gap 18.0 (H) 0.0 - 11.9    Glucose 700 (HH) 65 - 99 mg/dL    Bun 82 (HH) 8 - 22 mg/dL    Creatinine 2.70 (H) 0.50 - 1.40 mg/dL    Calcium 7.6 (L) 8.5 - 10.5 mg/dL    AST(SGOT) 18 12 - 45 U/L    ALT(SGPT) 7 2 - 50 U/L    Alkaline Phosphatase 86 30 - 99 U/L    Total Bilirubin 0.7 0.1 - 1.5 mg/dL    Albumin 2.8 (L) 3.2 - 4.9 g/dL    Total Protein 4.5 (L) 6.0 - 8.2 g/dL    Globulin 1.7 (L) 1.9 - 3.5 g/dL    A-G Ratio 1.6 g/dL   LACTIC ACID    Collection Time: 02/13/19  3:53 AM   Result Value Ref Range    Lactic Acid 3.3 (H) 0.5 - 2.0 mmol/L   MAGNESIUM     Collection Time: 02/13/19  3:53 AM   Result Value Ref Range    Magnesium 2.0 1.5 - 2.5 mg/dL   PHOSPHORUS    Collection Time: 02/13/19  3:53 AM   Result Value Ref Range    Phosphorus 3.4 2.5 - 4.5 mg/dL   DIFFERENTIAL MANUAL    Collection Time: 02/13/19  3:53 AM   Result Value Ref Range    Bands-Stabs 11.80 (H) 0.00 - 10.00 %    Myelocytes 0.90 %    Manual Diff Status PERFORMED    PERIPHERAL SMEAR REVIEW    Collection Time: 02/13/19  3:53 AM   Result Value Ref Range    Peripheral Smear Review see below    PLATELET ESTIMATE    Collection Time: 02/13/19  3:53 AM   Result Value Ref Range    Plt Estimation Normal    MORPHOLOGY    Collection Time: 02/13/19  3:53 AM   Result Value Ref Range    RBC Morphology Present    ESTIMATED GFR    Collection Time: 02/13/19  3:53 AM   Result Value Ref Range    GFR If  22 (A) >60 mL/min/1.73 m 2    GFR If Non  18 (A) >60 mL/min/1.73 m 2   Blood Glucose    Collection Time: 02/13/19  4:59 AM   Result Value Ref Range    Glucose 768 (HH) 65 - 99 mg/dL   ISTAT ARTERIAL BLOOD GAS    Collection Time: 02/13/19  5:34 AM   Result Value Ref Range    Ph 7.381 (L) 7.400 - 7.500    Pco2 31.1 26.0 - 37.0 mmHg    Po2 59 (L) 64 - 87 mmHg    Tco2 19 (L) 20 - 33 mmol/L    S02 90 (L) 93 - 99 %    Hco3 18.4 17.0 - 25.0 mmol/L    BE -6 (L) -4 - 3 mmol/L    Body Temp 100.8 F degrees    O2 Therapy 28 %    iPF Ratio 211     Ph Temp Torin 7.363 (L) 7.400 - 7.500    Pco2 Temp Co 32.8 26.0 - 37.0 mmHg    Po2 Temp Cor 64 64 - 87 mmHg    Specimen Arterial     Action Range Triggered NO     Inst. Qualified Patient YES    Blood Glucose    Collection Time: 02/13/19  5:59 AM   Result Value Ref Range    Glucose 756 (HH) 65 - 99 mg/dL     Medical Decision Making, by Problem:  Active Hospital Problems    Diagnosis   • Acute renal failure with acute tubular necrosis superimposed on stage 3 chronic kidney disease (HCC) [N17.0, N18.3]     Priority: High   • DKA (diabetic ketoacidoses) (HCC)  [E13.10]     Priority: High   • Hyponatremia [E87.1]     Priority: Medium   • Acute cystitis with hematuria [N30.01]     Priority: Medium   • Paroxysmal atrial fibrillation (HCC) [I48.0]     Priority: Medium   • Hyperosmolarity due to secondary diabetes mellitus (HCC) [E13.00]     Priority: Medium   • Hypertension [I10]     Priority: Medium   • Lactic acidosis [E87.2]     Priority: Low   • Metabolic encephalopathy [G93.41]     Priority: Low   • Hyperlipidemia [E78.5]     Priority: Low   • Depression [F32.9]     Priority: Low   • History of DVT (deep vein thrombosis) [Z86.718]     Priority: Low     Plan:  Not a surgical candidate, will sign off. Recommend going to comfort care.    Quality Measures:  Quality-Core Measures    Discussed patient condition with RN and [facility]

## 2019-02-13 NOTE — PROGRESS NOTES
Surgical Progress Note    Author: Berhane Escobar Date & Time created: 2019   9:20 AM     Interval Events:    Review of Systems   Unable to perform ROS: Medical condition     Hemodynamics:  Temp (24hrs), Av.5 °C (97.7 °F), Min:36.5 °C (97.7 °F), Max:36.5 °C (97.7 °F)  Temperature: 36.5 °C (97.7 °F), Monitored Temp: 38.5 °C (101.3 °F)  Pulse  Av.4  Min: 67  Max: 159Heart Rate (Monitored): (!) 155  NIBP: 106/68  CVP (mm Hg): (!) 0 MM HG  Respiratory:    Respiration: (!) 28, Pulse Oximetry: 93 %, O2 Daily Delivery Respiratory : Room Air with O2 Available        RUL Breath Sounds: Clear, RML Breath Sounds: Clear, RLL Breath Sounds: Diminished, EDWIN Breath Sounds: Clear, LLL Breath Sounds: Diminished  Neuro:  GCS Total Baldwin Coma Score: 10     Fluids:    Intake/Output Summary (Last 24 hours) at 19 0920  Last data filed at 19 0656   Gross per 24 hour   Intake          76926.1 ml   Output             1225 ml   Net          71993.1 ml     Weight: 64.3 kg (141 lb 12.1 oz)  Current Diet Order   Procedures   • Diet NPO     Physical Exam  Labs:  Recent Results (from the past 24 hour(s))   CBC WITH DIFFERENTIAL    Collection Time: 19 11:05 AM   Result Value Ref Range    WBC 10.5 4.8 - 10.8 K/uL    RBC 5.23 4.20 - 5.40 M/uL    Hemoglobin 13.5 12.0 - 16.0 g/dL    Hematocrit 51.5 (H) 37.0 - 47.0 %    MCV 98.5 (H) 81.4 - 97.8 fL    MCH 25.8 (L) 27.0 - 33.0 pg    MCHC 26.2 (L) 33.6 - 35.0 g/dL    RDW 51.1 (H) 35.9 - 50.0 fL    Platelet Count 331 164 - 446 K/uL    MPV 12.8 9.0 - 12.9 fL    Neutrophils-Polys 73.40 (H) 44.00 - 72.00 %    Lymphocytes 16.40 (L) 22.00 - 41.00 %    Monocytes 8.90 0.00 - 13.40 %    Eosinophils 0.10 0.00 - 6.90 %    Basophils 0.40 0.00 - 1.80 %    Immature Granulocytes 0.80 0.00 - 0.90 %    Nucleated RBC 0.00 /100 WBC    Neutrophils (Absolute) 7.72 (H) 2.00 - 7.15 K/uL    Lymphs (Absolute) 1.72 1.00 - 4.80 K/uL    Monos (Absolute) 0.94 (H) 0.00 - 0.85 K/uL    Eos (Absolute)  0.01 0.00 - 0.51 K/uL    Baso (Absolute) 0.04 0.00 - 0.12 K/uL    Immature Granulocytes (abs) 0.08 0.00 - 0.11 K/uL    NRBC (Absolute) 0.00 K/uL    Hypochromia 1+     Anisocytosis 1+     Microcytosis 1+    COMP METABOLIC PANEL    Collection Time: 02/12/19 11:05 AM   Result Value Ref Range    Sodium 126 (L) 135 - 145 mmol/L    Potassium 4.8 3.6 - 5.5 mmol/L    Chloride 72 (L) 96 - 112 mmol/L    Co2 14 (L) 20 - 33 mmol/L    Anion Gap 40.0 (H) 0.0 - 11.9    Glucose 1771 (HH) 65 - 99 mg/dL    Bun 113 (HH) 8 - 22 mg/dL    Creatinine 4.54 (H) 0.50 - 1.40 mg/dL    Calcium 9.1 8.5 - 10.5 mg/dL    AST(SGOT) 14 12 - 45 U/L    ALT(SGPT) 12 2 - 50 U/L    Alkaline Phosphatase 169 (H) 30 - 99 U/L    Total Bilirubin 1.2 0.1 - 1.5 mg/dL    Albumin 3.8 3.2 - 4.9 g/dL    Total Protein 7.1 6.0 - 8.2 g/dL    Globulin 3.3 1.9 - 3.5 g/dL    A-G Ratio 1.2 g/dL   HCG QUAL SERUM    Collection Time: 02/12/19 11:05 AM   Result Value Ref Range    Beta-Hcg Qualitative Serum Negative Negative   DIAGNOSTIC ALCOHOL    Collection Time: 02/12/19 11:05 AM   Result Value Ref Range    Diagnostic Alcohol 0.00 0.00 g/dL   URINE DRUG SCREEN (TRIAGE)    Collection Time: 02/12/19 11:05 AM   Result Value Ref Range    Amphetamines Urine Negative Negative    Barbiturates Negative Negative    Benzodiazepines Negative Negative    Cocaine Metabolite Negative Negative    Methadone Negative Negative    Opiates Negative Negative    Oxycodone Negative Negative    Phencyclidine -Pcp Negative Negative    Propoxyphene Negative Negative    Cannabinoid Metab Negative Negative   Lactic acid (lactate)    Collection Time: 02/12/19 11:05 AM   Result Value Ref Range    Lactic Acid 4.8 (HH) 0.5 - 2.0 mmol/L   URINALYSIS    Collection Time: 02/12/19 11:05 AM   Result Value Ref Range    Color Yellow     Character Turbid (A)     Specific Gravity 1.023 <1.035    Ph 5.0 5.0 - 8.0    Glucose >=1000 (A) Negative mg/dL    Ketones 15 (A) Negative mg/dL    Protein 30 (A) Negative mg/dL     Bilirubin Negative Negative    Urobilinogen, Urine 0.2 Negative    Nitrite Negative Negative    Leukocyte Esterase Moderate (A) Negative    Occult Blood Moderate (A) Negative    Micro Urine Req Microscopic    BLOOD CULTURE    Collection Time: 02/12/19 11:05 AM   Result Value Ref Range    Significant Indicator NEG     Source BLD     Site PERIPHERAL     Blood Culture       No Growth    Note: Blood cultures are incubated for 5 days and  are monitored continuously.Positive blood cultures  are called to the RN and reported as soon as  they are identified.     PERIPHERAL SMEAR REVIEW    Collection Time: 02/12/19 11:05 AM   Result Value Ref Range    Peripheral Smear Review see below    PLATELET ESTIMATE    Collection Time: 02/12/19 11:05 AM   Result Value Ref Range    Plt Estimation Normal    MORPHOLOGY    Collection Time: 02/12/19 11:05 AM   Result Value Ref Range    RBC Morphology Present    DIFFERENTIAL COMMENT    Collection Time: 02/12/19 11:05 AM   Result Value Ref Range    Comments-Diff see below    URINE MICROSCOPIC (W/UA)    Collection Time: 02/12/19 11:05 AM   Result Value Ref Range    WBC Packed (A) /hpf    RBC 5-10 (A) /hpf    Bacteria Negative None /hpf    Epithelial Cells Negative /hpf    Hyaline Cast 0-2 /lpf    Budding Yeast Present (A) Absent /hpf   TROPONIN    Collection Time: 02/12/19 11:05 AM   Result Value Ref Range    Troponin I 0.04 0.00 - 0.04 ng/mL   TSH    Collection Time: 02/12/19 11:05 AM   Result Value Ref Range    TSH 0.320 (L) 0.380 - 5.330 uIU/mL   PT/INR    Collection Time: 02/12/19 11:05 AM   Result Value Ref Range    PT 15.7 (H) 12.0 - 14.6 sec    INR 1.24 (H) 0.87 - 1.13   PTT    Collection Time: 02/12/19 11:05 AM   Result Value Ref Range    APTT 27.1 24.7 - 36.0 sec   BETA-HYDROXYBUTYRIC ACID    Collection Time: 02/12/19 11:05 AM   Result Value Ref Range    beta-Hydroxybutyric Acid >8.00 (H) 0.02 - 0.27 mmol/L   CREATINE KINASE    Collection Time: 02/12/19 11:05 AM   Result Value Ref  Range    CPK Total 135 0 - 154 U/L   ESTIMATED GFR    Collection Time: 19 11:05 AM   Result Value Ref Range    GFR If  12 (A) >60 mL/min/1.73 m 2    GFR If Non African American 10 (A) >60 mL/min/1.73 m 2   OSMOLALITY SERUM    Collection Time: 19 11:05 AM   Result Value Ref Range    Osmolality Serum 425 (H) 278 - 298 mOsm/kg H2O   EKG    Collection Time: 19 11:23 AM   Result Value Ref Range    Report       [facility] Emergency Dept.    Test Date:  2019  Pt Name:    CANDICE TURNER                Department: ER  MRN:        [id number]                      Room:       [facility]  Gender:     Female                       Technician: [id number]  :        1960                   Requested By:ER TRIAGE PROTOCOL  Order #:    [id number]                    Reading MD: DERICK RENAE MD    Measurements  Intervals                                Axis  Rate:       137                          P:  ND:                                      QRS:        57  QRSD:       102                          T:          62  QT:         352  QTc:        532    Interpretive Statements  ATRIAL FIBRILLATION  PROLONGED QT INTERVAL  Compared to ECG 2018 15:17:19  Prolonged QT interval now present  Sinus rhythm no longer present    Electronically Signed On 2019 14:04:59 PST by DERICK RENAE MD     BLOOD CULTURE    Collection Time: 19 12:24 PM   Result Value Ref Range    Significant Indicator NEG     Source BLD     Site PERIPHERAL     Blood Culture       No Growth    Note: Blood cultures are incubated for 5 days and  are monitored continuously.Positive blood cultures  are called to the RN and reported as soon as  they are identified.     VENOUS BLOOD GAS    Collection Time: 19 12:25 PM   Result Value Ref Range    Venous Bg Ph 7.15 (L) 7.31 - 7.45    Venous Bg Pco2 37.0 (L) 41.0 - 51.0 mmHg    Venous Bg Po2 33.6 25.0 - 40.0 mmHg    Venous Bg O2 Saturation 46.4 %    Venous Bg  Hco3 13 (L) 24 - 28 mmol/L    Venous Bg Base Excess -15 mmol/L    Body Temp see below Centigrade   ACCU-CHEK GLUCOSE    Collection Time: 02/12/19  2:34 PM   Result Value Ref Range    Glucose - Accu-Ck >600 (HH) 65 - 99 mg/dL   Lactic acid (lactate)    Collection Time: 02/12/19  2:37 PM   Result Value Ref Range    Lactic Acid 3.1 (H) 0.5 - 2.0 mmol/L   Comp Metabolic Panel    Collection Time: 02/12/19  2:37 PM   Result Value Ref Range    Sodium 128 (L) 135 - 145 mmol/L    Potassium 3.8 3.6 - 5.5 mmol/L    Chloride 85 (L) 96 - 112 mmol/L    Co2 15 (L) 20 - 33 mmol/L    Anion Gap 28.0 (H) 0.0 - 11.9    Glucose 1659 (HH) 65 - 99 mg/dL    Bun 102 (HH) 8 - 22 mg/dL    Creatinine 4.24 (H) 0.50 - 1.40 mg/dL    Calcium 8.4 (L) 8.5 - 10.5 mg/dL    AST(SGOT) 13 12 - 45 U/L    ALT(SGPT) 12 2 - 50 U/L    Alkaline Phosphatase 134 (H) 30 - 99 U/L    Total Bilirubin 0.7 0.1 - 1.5 mg/dL    Albumin 2.9 (L) 3.2 - 4.9 g/dL    Total Protein 5.3 (L) 6.0 - 8.2 g/dL    Globulin 2.4 1.9 - 3.5 g/dL    A-G Ratio 1.2 g/dL   MAGNESIUM    Collection Time: 02/12/19  2:37 PM   Result Value Ref Range    Magnesium 2.3 1.5 - 2.5 mg/dL   PHOSPHORUS    Collection Time: 02/12/19  2:37 PM   Result Value Ref Range    Phosphorus 6.9 (H) 2.5 - 4.5 mg/dL   CREATINE KINASE    Collection Time: 02/12/19  2:37 PM   Result Value Ref Range    CPK Total 128 0 - 154 U/L   ESTIMATED GFR    Collection Time: 02/12/19  2:37 PM   Result Value Ref Range    GFR If  13 (A) >60 mL/min/1.73 m 2    GFR If Non African American 11 (A) >60 mL/min/1.73 m 2   DIAGNOSTIC ALCOHOL    Collection Time: 02/12/19  4:37 PM   Result Value Ref Range    Diagnostic Alcohol 0.00 0.00 g/dL   Salicylate    Collection Time: 02/12/19  4:37 PM   Result Value Ref Range    Salicylates, Quant. 0 (L) 15 - 25 mg/dL   ACETAMINOPHEN    Collection Time: 02/12/19  4:37 PM   Result Value Ref Range    Acetaminophen -Tylenol <10 10 - 30 ug/mL   CORTISOL    Collection Time: 02/12/19  4:37 PM    Result Value Ref Range    Cortisol 98.7 (H) 0.0 - 23.0 ug/dL   MAGNESIUM    Collection Time: 02/12/19  4:37 PM   Result Value Ref Range    Magnesium 1.9 1.5 - 2.5 mg/dL   Hemoglobin A1c    Collection Time: 02/12/19  5:30 PM   Result Value Ref Range    Glycohemoglobin 17.5 (H) 0.0 - 5.6 %    Est Avg Glucose 456 mg/dL   AMMONIA    Collection Time: 02/12/19  6:01 PM   Result Value Ref Range    Ammonia 53 (H) 11 - 45 umol/L   ACCU-CHEK GLUCOSE    Collection Time: 02/12/19  6:01 PM   Result Value Ref Range    Glucose - Accu-Ck >600 (HH) 65 - 99 mg/dL   Comp Metabolic Panel    Collection Time: 02/12/19  6:01 PM   Result Value Ref Range    Sodium 136 135 - 145 mmol/L    Potassium 3.7 3.6 - 5.5 mmol/L    Chloride 96 96 - 112 mmol/L    Co2 18 (L) 20 - 33 mmol/L    Anion Gap 22.0 (H) 0.0 - 11.9    Glucose 1255 (HH) 65 - 99 mg/dL    Bun 95 (HH) 8 - 22 mg/dL    Creatinine 3.65 (H) 0.50 - 1.40 mg/dL    Calcium 8.1 (L) 8.5 - 10.5 mg/dL    AST(SGOT) 15 12 - 45 U/L    ALT(SGPT) 10 2 - 50 U/L    Alkaline Phosphatase 127 (H) 30 - 99 U/L    Total Bilirubin 0.6 0.1 - 1.5 mg/dL    Albumin 2.7 (L) 3.2 - 4.9 g/dL    Total Protein 4.9 (L) 6.0 - 8.2 g/dL    Globulin 2.2 1.9 - 3.5 g/dL    A-G Ratio 1.2 g/dL   ESTIMATED GFR    Collection Time: 02/12/19  6:01 PM   Result Value Ref Range    GFR If  15 (A) >60 mL/min/1.73 m 2    GFR If Non  13 (A) >60 mL/min/1.73 m 2   Comp Metabolic Panel    Collection Time: 02/12/19  7:45 PM   Result Value Ref Range    Sodium 139 135 - 145 mmol/L    Potassium 4.0 3.6 - 5.5 mmol/L    Chloride 97 96 - 112 mmol/L    Co2 20 20 - 33 mmol/L    Anion Gap 22.0 (H) 0.0 - 11.9    Glucose 981 (HH) 65 - 99 mg/dL    Bun 97 (HH) 8 - 22 mg/dL    Creatinine 3.51 (H) 0.50 - 1.40 mg/dL    Calcium 8.0 (L) 8.5 - 10.5 mg/dL    AST(SGOT) 14 12 - 45 U/L    ALT(SGPT) 8 2 - 50 U/L    Alkaline Phosphatase 106 (H) 30 - 99 U/L    Total Bilirubin 0.9 0.1 - 1.5 mg/dL    Albumin 3.7 3.2 - 4.9 g/dL    Total  Protein 5.8 (L) 6.0 - 8.2 g/dL    Globulin 2.1 1.9 - 3.5 g/dL    A-G Ratio 1.8 g/dL   ESTIMATED GFR    Collection Time: 02/12/19  7:45 PM   Result Value Ref Range    GFR If  16 (A) >60 mL/min/1.73 m 2    GFR If Non  13 (A) >60 mL/min/1.73 m 2   Blood Glucose    Collection Time: 02/12/19  8:40 PM   Result Value Ref Range    Glucose 945 (HH) 65 - 99 mg/dL   LACTIC ACID    Collection Time: 02/12/19  8:40 PM   Result Value Ref Range    Lactic Acid 3.6 (H) 0.5 - 2.0 mmol/L   CBC WITHOUT DIFFERENTIAL    Collection Time: 02/12/19  8:48 PM   Result Value Ref Range    WBC 4.7 (L) 4.8 - 10.8 K/uL    RBC 3.99 (L) 4.20 - 5.40 M/uL    Hemoglobin 10.3 (L) 12.0 - 16.0 g/dL    Hematocrit 32.1 (L) 37.0 - 47.0 %    MCV 80.5 (L) 81.4 - 97.8 fL    MCH 25.8 (L) 27.0 - 33.0 pg    MCHC 32.1 (L) 33.6 - 35.0 g/dL    RDW 38.8 35.9 - 50.0 fL    Platelet Count 214 164 - 446 K/uL    MPV 12.1 9.0 - 12.9 fL   MAGNESIUM    Collection Time: 02/12/19  9:48 PM   Result Value Ref Range    Magnesium 2.6 (H) 1.5 - 2.5 mg/dL   PHOSPHORUS    Collection Time: 02/12/19  9:48 PM   Result Value Ref Range    Phosphorus 4.1 2.5 - 4.5 mg/dL   Blood Glucose    Collection Time: 02/12/19  9:48 PM   Result Value Ref Range    Glucose 993 (HH) 65 - 99 mg/dL   Basic Metabolic Panel    Collection Time: 02/12/19 10:49 PM   Result Value Ref Range    Sodium 140 135 - 145 mmol/L    Potassium 4.4 3.6 - 5.5 mmol/L    Chloride 99 96 - 112 mmol/L    Co2 20 20 - 33 mmol/L    Glucose 862 (HH) 65 - 99 mg/dL    Bun 94 (HH) 8 - 22 mg/dL    Creatinine 3.22 (H) 0.50 - 1.40 mg/dL    Calcium 7.8 (L) 8.5 - 10.5 mg/dL    Anion Gap 21.0 (H) 0.0 - 11.9   LIPASE    Collection Time: 02/12/19 10:49 PM   Result Value Ref Range    Lipase 586 (H) 11 - 82 U/L   ESTIMATED GFR    Collection Time: 02/12/19 10:49 PM   Result Value Ref Range    GFR If  18 (A) >60 mL/min/1.73 m 2    GFR If Non African American 15 (A) >60 mL/min/1.73 m 2   LACTIC ACID     Collection Time: 02/12/19 11:49 PM   Result Value Ref Range    Lactic Acid 3.6 (H) 0.5 - 2.0 mmol/L   Blood Glucose    Collection Time: 02/12/19 11:49 PM   Result Value Ref Range    Glucose 918 (HH) 65 - 99 mg/dL   ACCU-CHEK GLUCOSE    Collection Time: 02/13/19  1:05 AM   Result Value Ref Range    Glucose - Accu-Ck >600 (HH) 65 - 99 mg/dL   Blood Glucose    Collection Time: 02/13/19  1:13 AM   Result Value Ref Range    Glucose 914 (HH) 65 - 99 mg/dL   Blood Glucose    Collection Time: 02/13/19  2:03 AM   Result Value Ref Range    Glucose 848 (HH) 65 - 99 mg/dL   Lipid Profile    Collection Time: 02/13/19  2:03 AM   Result Value Ref Range    Cholesterol,Tot 51 (L) 100 - 199 mg/dL    Triglycerides 176 (H) 0 - 149 mg/dL    HDL 19 (A) >=40 mg/dL    LDL see below <100 mg/dL   Blood Glucose    Collection Time: 02/13/19  3:08 AM   Result Value Ref Range    Glucose 704 (HH) 65 - 99 mg/dL   CBC with Differential    Collection Time: 02/13/19  3:53 AM   Result Value Ref Range    WBC 4.9 4.8 - 10.8 K/uL    RBC 4.10 (L) 4.20 - 5.40 M/uL    Hemoglobin 10.6 (L) 12.0 - 16.0 g/dL    Hematocrit 32.5 (L) 37.0 - 47.0 %    MCV 79.3 (L) 81.4 - 97.8 fL    MCH 25.9 (L) 27.0 - 33.0 pg    MCHC 32.6 (L) 33.6 - 35.0 g/dL    RDW 37.9 35.9 - 50.0 fL    Platelet Count 206 164 - 446 K/uL    MPV 12.3 9.0 - 12.9 fL    Neutrophils-Polys 71.80 44.00 - 72.00 %    Lymphocytes 11.90 (L) 22.00 - 41.00 %    Monocytes 1.80 0.00 - 13.40 %    Eosinophils 0.00 0.00 - 6.90 %    Basophils 1.80 0.00 - 1.80 %    Nucleated RBC 0.00 /100 WBC    Neutrophils (Absolute) 4.10 2.00 - 7.15 K/uL    Lymphs (Absolute) 0.58 (L) 1.00 - 4.80 K/uL    Monos (Absolute) 0.09 0.00 - 0.85 K/uL    Eos (Absolute) 0.00 0.00 - 0.51 K/uL    Baso (Absolute) 0.09 0.00 - 0.12 K/uL    NRBC (Absolute) 0.00 K/uL    Anisocytosis 1+     Microcytosis 1+    Comp Metabolic Panel (CMP)    Collection Time: 02/13/19  3:53 AM   Result Value Ref Range    Sodium 144 135 - 145 mmol/L    Potassium 4.7 3.6 -  5.5 mmol/L    Chloride 104 96 - 112 mmol/L    Co2 22 20 - 33 mmol/L    Anion Gap 18.0 (H) 0.0 - 11.9    Glucose 700 (HH) 65 - 99 mg/dL    Bun 82 (HH) 8 - 22 mg/dL    Creatinine 2.70 (H) 0.50 - 1.40 mg/dL    Calcium 7.6 (L) 8.5 - 10.5 mg/dL    AST(SGOT) 18 12 - 45 U/L    ALT(SGPT) 7 2 - 50 U/L    Alkaline Phosphatase 86 30 - 99 U/L    Total Bilirubin 0.7 0.1 - 1.5 mg/dL    Albumin 2.8 (L) 3.2 - 4.9 g/dL    Total Protein 4.5 (L) 6.0 - 8.2 g/dL    Globulin 1.7 (L) 1.9 - 3.5 g/dL    A-G Ratio 1.6 g/dL   LACTIC ACID    Collection Time: 02/13/19  3:53 AM   Result Value Ref Range    Lactic Acid 3.3 (H) 0.5 - 2.0 mmol/L   MAGNESIUM    Collection Time: 02/13/19  3:53 AM   Result Value Ref Range    Magnesium 2.0 1.5 - 2.5 mg/dL   PHOSPHORUS    Collection Time: 02/13/19  3:53 AM   Result Value Ref Range    Phosphorus 3.4 2.5 - 4.5 mg/dL   DIFFERENTIAL MANUAL    Collection Time: 02/13/19  3:53 AM   Result Value Ref Range    Bands-Stabs 11.80 (H) 0.00 - 10.00 %    Myelocytes 0.90 %    Manual Diff Status PERFORMED    PERIPHERAL SMEAR REVIEW    Collection Time: 02/13/19  3:53 AM   Result Value Ref Range    Peripheral Smear Review see below    PLATELET ESTIMATE    Collection Time: 02/13/19  3:53 AM   Result Value Ref Range    Plt Estimation Normal    MORPHOLOGY    Collection Time: 02/13/19  3:53 AM   Result Value Ref Range    RBC Morphology Present    ESTIMATED GFR    Collection Time: 02/13/19  3:53 AM   Result Value Ref Range    GFR If  22 (A) >60 mL/min/1.73 m 2    GFR If Non  18 (A) >60 mL/min/1.73 m 2   Blood Glucose    Collection Time: 02/13/19  4:59 AM   Result Value Ref Range    Glucose 768 (HH) 65 - 99 mg/dL   ISTAT ARTERIAL BLOOD GAS    Collection Time: 02/13/19  5:34 AM   Result Value Ref Range    Ph 7.381 (L) 7.400 - 7.500    Pco2 31.1 26.0 - 37.0 mmHg    Po2 59 (L) 64 - 87 mmHg    Tco2 19 (L) 20 - 33 mmol/L    S02 90 (L) 93 - 99 %    Hco3 18.4 17.0 - 25.0 mmol/L    BE -6 (L) -4 - 3 mmol/L     Body Temp 100.8 F degrees    O2 Therapy 28 %    iPF Ratio 211     Ph Temp Torin 7.363 (L) 7.400 - 7.500    Pco2 Temp Co 32.8 26.0 - 37.0 mmHg    Po2 Temp Cor 64 64 - 87 mmHg    Specimen Arterial     Action Range Triggered NO     Inst. Qualified Patient YES    Blood Glucose    Collection Time: 02/13/19  5:59 AM   Result Value Ref Range    Glucose 756 (HH) 65 - 99 mg/dL   Blood Glucose    Collection Time: 02/13/19  7:12 AM   Result Value Ref Range    Glucose 669 (HH) 65 - 99 mg/dL   LACTIC ACID    Collection Time: 02/13/19  8:05 AM   Result Value Ref Range    Lactic Acid 3.5 (H) 0.5 - 2.0 mmol/L     Medical Decision Making, by Problem:  Active Hospital Problems    Diagnosis   • Acute renal failure with acute tubular necrosis superimposed on stage 3 chronic kidney disease (HCC) [N17.0, N18.3]     Priority: High   • DKA (diabetic ketoacidoses) (HCC) [E13.10]     Priority: High   • Hyponatremia [E87.1]     Priority: Medium   • Acute cystitis with hematuria [N30.01]     Priority: Medium   • Paroxysmal atrial fibrillation (HCC) [I48.0]     Priority: Medium   • Hyperosmolarity due to secondary diabetes mellitus (HCC) [E13.00]     Priority: Medium   • Hypertension [I10]     Priority: Medium   • Lactic acidosis [E87.2]     Priority: Low   • Metabolic encephalopathy [G93.41]     Priority: Low   • Hyperlipidemia [E78.5]     Priority: Low   • Depression [F32.9]     Priority: Low   • History of DVT (deep vein thrombosis) [Z86.718]     Priority: Low     Plan:  Critical care feels patient is showing some improvement and thinks she needs exploration. Will schedule exploratory laparotomy with possible bowel resection. Prognosis poor, may have lethal injury.    Quality Measures:  Quality-Core Measures    Discussed patient condition with Hospitalist

## 2019-02-13 NOTE — PROGRESS NOTES
1545: Bedside report received from RIKKI Vasquez. Patient transported from [facility] 02 to [facility], on monitor via ACLS RN and CCT. All belongings with patient.  1555: Dr. Lambert notified of patient's hypotension upon arrival to unit.   1600: Dr. Lambert at bedside for central line placement.

## 2019-02-13 NOTE — ASSESSMENT & PLAN NOTE
Patient to be closely monitored for volume overload and to start IV metoprolol/hydralazine if systolic blood pressure greater than 160.

## 2019-02-13 NOTE — PROGRESS NOTES
Per RN report patient previously AOx2 and able to speak and at times following simple commands. During handoff patient not answering questions, not following commands but awake.  Discussed with Dr. Loo.  Will continue insulin therapy and hourly STAT blood glucose.

## 2019-02-14 NOTE — CARE PLAN
Problem: Ventilation Defect:  Goal: Ability to achieve and maintain unassisted ventilation or tolerate decreased levels of ventilator support  Outcome: PROGRESSING SLOWER THAN EXPECTED  Adult Ventilation Update    Total Vent Days: 2    Patient Lines/Drains/Airways Status    Active Airway     Name: Placement date: Placement time: Site: Days:    Airway ETT Oral 8.0@20  02/13/19   1030   Oral      2              CMV 20/310/8/40%    Plateau Pressure (Q Shift): 16 (02/13/19 1830)  Static Compliance (ml / cm H2O): 50.3 (02/14/19 0239)    Sputum/Suction   Cough: Non Productive (02/14/19 0400)  Sputum Amount: Small (02/14/19 0400)  Sputum Color: Clear (02/14/19 0400)  Sputum Consistency: Thin (02/14/19 0400)    Mobility  Level of Mobility: Level II (02/13/19 2000)  Activity Performed: Unable to mobilize (02/13/19 2000)  Reason Not Mobilized: Unstable condition (02/13/19 2000)  Mobilization Comments: triple pressor support (02/13/19 2000)    Events/Summary/Plan: Pt unstable. No ventilator changes made. Not escalating care.

## 2019-02-14 NOTE — PROGRESS NOTES
Surgical Progress Note    Author: Berhane Escobar Date & Time created: 2019   8:02 AM     Interval Events:    ROS  Hemodynamics:  Temp (24hrs), Av.5 °C (101.3 °F), Min:38.5 °C (101.3 °F), Max:38.5 °C (101.3 °F)  Temperature: (!) 38.5 °C (101.3 °F), Monitored Temp: 38.9 °C (102 °F)  Pulse  Av  Min: 30  Max: 174Heart Rate (Monitored): (!) 135  Blood Pressure: 112/55, Arterial BP: 136/63, NIBP: 132/63  CVP (mm Hg): 5 MM HG  Respiratory:  Treadwell Vent Mode: APVCMV, Rate (breaths/min): 20, PEEP/CPAP: 8, FiO2: 40, P Peak (PIP): 17, P MEAN: 10 Respiration: 14, Pulse Oximetry: 99 %     Work Of Breathing / Effort: Vented  RUL Breath Sounds: Clear, RML Breath Sounds: Clear;Diminished, RLL Breath Sounds: Diminished, EDWIN Breath Sounds: Clear, LLL Breath Sounds: Diminished  Neuro:  GCS Total Ch[place]an Coma Score: 9     Fluids:    Intake/Output Summary (Last 24 hours) at 19 0802  Last data filed at 19 0600   Gross per 24 hour   Intake          9134.58 ml   Output             1110 ml   Net          8024.58 ml        No Active Diet Orders    Physical Exam  Labs:  Recent Results (from the past 24 hour(s))   LACTIC ACID    Collection Time: 19  8:05 AM   Result Value Ref Range    Lactic Acid 3.5 (H) 0.5 - 2.0 mmol/L   Basic Metabolic Panel (BMP)    Collection Time: 19  8:05 AM   Result Value Ref Range    Sodium 142 135 - 145 mmol/L    Potassium 4.7 3.6 - 5.5 mmol/L    Chloride 106 96 - 112 mmol/L    Co2 18 (L) 20 - 33 mmol/L    Glucose 644 (HH) 65 - 99 mg/dL    Bun 84 (HH) 8 - 22 mg/dL    Creatinine 2.63 (H) 0.50 - 1.40 mg/dL    Calcium 7.4 (L) 8.5 - 10.5 mg/dL    Anion Gap 18.0 (H) 0.0 - 11.9   ESTIMATED GFR    Collection Time: 19  8:05 AM   Result Value Ref Range    GFR If  23 (A) >60 mL/min/1.73 m 2    GFR If Non  19 (A) >60 mL/min/1.73 m 2   ACCU-CHEK GLUCOSE    Collection Time: 19  9:25 AM   Result Value Ref Range    Glucose - Accu-Ck 548 (HH) 65 - 99  mg/dL   ACCU-CHEK GLUCOSE    Collection Time: 02/13/19 10:58 AM   Result Value Ref Range    Glucose - Accu-Ck 515 (HH) 65 - 99 mg/dL   ACCU-CHEK GLUCOSE    Collection Time: 02/13/19 12:28 PM   Result Value Ref Range    Glucose - Accu-Ck 523 (HH) 65 - 99 mg/dL   Magnesium    Collection Time: 02/13/19 12:45 PM   Result Value Ref Range    Magnesium 1.9 1.5 - 2.5 mg/dL   Basic Metabolic Panel (BMP)    Collection Time: 02/13/19 12:45 PM   Result Value Ref Range    Sodium 143 135 - 145 mmol/L    Potassium 5.1 3.6 - 5.5 mmol/L    Chloride 110 96 - 112 mmol/L    Co2 20 20 - 33 mmol/L    Glucose 623 (HH) 65 - 99 mg/dL    Bun 86 (HH) 8 - 22 mg/dL    Creatinine 2.49 (H) 0.50 - 1.40 mg/dL    Calcium 7.2 (L) 8.5 - 10.5 mg/dL    Anion Gap 13.0 (H) 0.0 - 11.9   Phosphorus    Collection Time: 02/13/19 12:45 PM   Result Value Ref Range    Phosphorus 3.9 2.5 - 4.5 mg/dL   LACTIC ACID    Collection Time: 02/13/19 12:45 PM   Result Value Ref Range    Lactic Acid 3.5 (H) 0.5 - 2.0 mmol/L   ESTIMATED GFR    Collection Time: 02/13/19 12:45 PM   Result Value Ref Range    GFR If  24 (A) >60 mL/min/1.73 m 2    GFR If Non African American 20 (A) >60 mL/min/1.73 m 2   ACCU-CHEK GLUCOSE    Collection Time: 02/13/19  1:40 PM   Result Value Ref Range    Glucose - Accu-Ck 522 (HH) 65 - 99 mg/dL   ACCU-CHEK GLUCOSE    Collection Time: 02/13/19  2:32 PM   Result Value Ref Range    Glucose - Accu-Ck 548 (HH) 65 - 99 mg/dL   ACCU-CHEK GLUCOSE    Collection Time: 02/13/19  3:31 PM   Result Value Ref Range    Glucose - Accu-Ck 464 (HH) 65 - 99 mg/dL   ISTAT ARTERIAL BLOOD GAS    Collection Time: 02/13/19  4:09 PM   Result Value Ref Range    Ph 7.288 (LL) 7.400 - 7.500    Pco2 34.7 26.0 - 37.0 mmHg    Po2 110 (H) 64 - 87 mmHg    Tco2 18 (L) 20 - 33 mmol/L    S02 98 93 - 99 %    Hco3 16.6 (L) 17.0 - 25.0 mmol/L    BE -9 (L) -4 - 3 mmol/L    Body Temp 102.0 F degrees    O2 Therapy 70 %    iPF Ratio 157     Ph Temp Torin 7.261 (LL) 7.400 -  7.500    Pco2 Temp Co 37.7 (H) 26.0 - 37.0 mmHg    Po2 Temp Cor 122 (H) 64 - 87 mmHg    Specimen Arterial     Action Range Triggered YES     Inst. Qualified Patient YES    ACCU-CHEK GLUCOSE    Collection Time: 02/13/19  4:45 PM   Result Value Ref Range    Glucose - Accu-Ck 436 (HH) 65 - 99 mg/dL   Basic Metabolic Panel (BMP)    Collection Time: 02/13/19  6:00 PM   Result Value Ref Range    Sodium 145 135 - 145 mmol/L    Potassium 4.8 3.6 - 5.5 mmol/L    Chloride 115 (H) 96 - 112 mmol/L    Co2 21 20 - 33 mmol/L    Glucose 432 (H) 65 - 99 mg/dL    Bun 85 (HH) 8 - 22 mg/dL    Creatinine 2.41 (H) 0.50 - 1.40 mg/dL    Calcium 7.4 (L) 8.5 - 10.5 mg/dL    Anion Gap 9.0 0.0 - 11.9   ESTIMATED GFR    Collection Time: 02/13/19  6:00 PM   Result Value Ref Range    GFR If African American 25 (A) >60 mL/min/1.73 m 2    GFR If Non African American 21 (A) >60 mL/min/1.73 m 2   ACCU-CHEK GLUCOSE    Collection Time: 02/13/19  6:03 PM   Result Value Ref Range    Glucose - Accu-Ck 366 (H) 65 - 99 mg/dL   ACCU-CHEK GLUCOSE    Collection Time: 02/13/19  7:07 PM   Result Value Ref Range    Glucose - Accu-Ck 309 (H) 65 - 99 mg/dL   ACCU-CHEK GLUCOSE    Collection Time: 02/13/19  8:11 PM   Result Value Ref Range    Glucose - Accu-Ck 303 (H) 65 - 99 mg/dL   ACCU-CHEK GLUCOSE    Collection Time: 02/13/19  9:25 PM   Result Value Ref Range    Glucose - Accu-Ck 228 (H) 65 - 99 mg/dL   ACCU-CHEK GLUCOSE    Collection Time: 02/13/19 10:25 PM   Result Value Ref Range    Glucose - Accu-Ck 151 (H) 65 - 99 mg/dL   ACCU-CHEK GLUCOSE    Collection Time: 02/13/19 11:29 PM   Result Value Ref Range    Glucose - Accu-Ck 119 (H) 65 - 99 mg/dL   ACCU-CHEK GLUCOSE    Collection Time: 02/14/19 12:41 AM   Result Value Ref Range    Glucose - Accu-Ck 148 (H) 65 - 99 mg/dL   ACCU-CHEK GLUCOSE    Collection Time: 02/14/19  1:41 AM   Result Value Ref Range    Glucose - Accu-Ck 194 (H) 65 - 99 mg/dL   ACCU-CHEK GLUCOSE    Collection Time: 02/14/19  2:47 AM   Result  Value Ref Range    Glucose - Accu-Ck 180 (H) 65 - 99 mg/dL   ACCU-CHEK GLUCOSE    Collection Time: 02/14/19  3:45 AM   Result Value Ref Range    Glucose - Accu-Ck 177 (H) 65 - 99 mg/dL   BASIC METABOLIC PANEL    Collection Time: 02/14/19  4:45 AM   Result Value Ref Range    Sodium 145 135 - 145 mmol/L    Potassium 4.2 3.6 - 5.5 mmol/L    Chloride 118 (H) 96 - 112 mmol/L    Co2 17 (L) 20 - 33 mmol/L    Glucose 245 (H) 65 - 99 mg/dL    Bun 76 (HH) 8 - 22 mg/dL    Creatinine 2.09 (H) 0.50 - 1.40 mg/dL    Calcium 7.7 (L) 8.5 - 10.5 mg/dL    Anion Gap 10.0 0.0 - 11.9   MAGNESIUM    Collection Time: 02/14/19  4:45 AM   Result Value Ref Range    Magnesium 1.5 1.5 - 2.5 mg/dL   PHOSPHORUS    Collection Time: 02/14/19  4:45 AM   Result Value Ref Range    Phosphorus 2.0 (L) 2.5 - 4.5 mg/dL   ESTIMATED GFR    Collection Time: 02/14/19  4:45 AM   Result Value Ref Range    GFR If  29 (A) >60 mL/min/1.73 m 2    GFR If Non  24 (A) >60 mL/min/1.73 m 2   ACCU-CHEK GLUCOSE    Collection Time: 02/14/19  4:46 AM   Result Value Ref Range    Glucose - Accu-Ck 207 (H) 65 - 99 mg/dL   ACCU-CHEK GLUCOSE    Collection Time: 02/14/19  6:24 AM   Result Value Ref Range    Glucose - Accu-Ck 206 (H) 65 - 99 mg/dL   ACCU-CHEK GLUCOSE    Collection Time: 02/14/19  7:31 AM   Result Value Ref Range    Glucose - Accu-Ck 188 (H) 65 - 99 mg/dL     Medical Decision Making, by Problem:  Active Hospital Problems    Diagnosis   • 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 fib [K55.9]     Priority: High   • Septic shock (HCC) likely in setting of ischemic bowel [A41.9, R65.21]     Priority: High   • Acute respiratory failure with hypoxia (HCC) [J96.01]     Priority: High   • DKA (diabetic ketoacidoses) ([facility]) [E13.10]     Priority: High   • Acute renal failure with acute tubular necrosis superimposed on stage 3 chronic kidney disease (HCC) [N17.0, N18.3]     Priority: Medium   • Hyponatremia [E87.1]     Priority:  Medium   • Acute cystitis with hematuria [N30.01]     Priority: Medium   • Paroxysmal atrial fibrillation (HCC) [I48.0]     Priority: Medium   • Metabolic encephalopathy [G93.41]     Priority: Medium   • Hyperosmolarity due to secondary diabetes mellitus (HCC) [E13.00]     Priority: Medium   • Lactic acidosis [E87.2]     Priority: Low   • Hypertension [I10]     Priority: Low   • Hyperlipidemia [E78.5]     Priority: Low   • Depression [F32.9]     Priority: Low   • History of DVT (deep vein thrombosis) [Z86.718]     Priority: Low     Plan:  Signing off, as there is no surgical treatment, understand there is a problem with family and patients Identity. Recommend withdrawing all life supporting medications as soon as possible.    Quality Measures:  Quality-Core Measures    Discussed patient condition with RN and [facility]

## 2019-02-14 NOTE — PROGRESS NOTES
Anesthesia Follow up Note    POD#1 s/p ex lap.  Pt is still intubated and sedated.  Pt is septic, on pressors.  Pt has ischemic bowel that was not amenable to resection.  Pt is being placed on comfort care, DNR.    There are no known anesthesia related complications.

## 2019-02-14 NOTE — PROGRESS NOTES
Dr. Wood with palliative/ hospice care at bedside assessing pt. Involuntary movements/ twitches, eyes remain open, sluggish reaction. Cold peripheral extrem with mottled, blueish toes/ feet; >3 sec cap refill. Temp >101; cooling blanket in use.

## 2019-02-14 NOTE — DISCHARGE PLANNING
Care Transition Team Assessment    Information for this assessment was compiled from a tc interview with the pt's house mate, Ki as well as chart information. Maribel is currently involved in determining the pt's care because [facility] has not been able to determine the pt's NOK. In a person search conducted by [facility], it was discovered that the pt's SS# is accentually that of a different person. Potential family from the search brought up the name of the pt but not contact information. Ki confirmed the pt's name. Ki also reports that the pt is not , does not have any children, and does not have contact with her family. Ki stated that he believed the pt's family to be in [place] but is not 100% sure. iK reports that the pt was completely independent with her ADL/IADLs prior to being admitted. Evan reports being homebound and in his 80's. The pt was providing in-home care to Ki. Attending physician, Dr. Lambert reports thatsAs of the time of this assessment, the pt's likelihood of surviving her current hospital stay is poor. DC needs or disposition is not known at the time of this assessment.      Information Source  Orientation : Unable to Assess  Information Given By: Friend  Informant's Name: Ki Couch  Who is responsible for making decisions for patient? : Other  Name(s) of Primary Decision Maker: [facility]    Readmission Evaluation  Is this a readmission?: No    Elopement Risk  Legal Hold: No  Ambulatory or Self Mobile in Wheelchair: No-Not an Elopement Risk  Elopement Risk: Not at Risk for Elopement         Discharge Preparedness  What is your plan after discharge?: Uncertain - pending medical team collaboration  What are your discharge supports?: Other (comment) (none)  Prior Functional Level: Ambulatory, Drives Self, Independent with Activities of Daily Living, Independent with Medication Management  Difficulity with ADLs: None  Difficulity with IADLs:  None    Functional Assesment  Prior Functional Level: Ambulatory, Drives Self, Independent with Activities of Daily Living, Independent with Medication Management    Finances  Financial Barriers to Discharge: No  Prescription Coverage: No              Advance Directive  Advance Directive?: Clinically incapacitated / Inappropriate    Domestic Abuse  Have you ever been the victim of abuse or violence?: No    Psychological Assessment  History of Substance Abuse: None  History of Psychiatric Problems: No  Non-compliant with Treatment: No  Newly Diagnosed Illness: Yes    Discharge Risks or Barriers  Discharge risks or barriers?: Uninsured / underinsured, Transportation, Post-acute placement / services (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

## 2019-02-14 NOTE — CARE PLAN
Problem: Ventilation Defect:  Goal: Ability to achieve and maintain unassisted ventilation or tolerate decreased levels of ventilator support  Outcome: PROGRESSING SLOWER THAN EXPECTED  Adult Ventilation Update    Total Vent Days: 1    Patient Lines/Drains/Airways Status    Active Airway     Name: Placement date: Placement time: Site: Days:    Airway ETT Oral 8.0 02/13/19   1030   Oral   1           Plateau Pressure (Q Shift): 18 (02/13/19 1606)  Static Compliance (ml / cm H2O): 36.1 (02/13/19 1606)    Patient failed trials because of Barriers to Wean: Other (Comments) (pt remains to critical) (02/13/19 1512)    Sputum Amount: Unable to Evaluate (02/13/19 1200)  Sputum Color: Unable to Evaluate (02/13/19 1200)  Sputum Consistency: Unable to Evaluate (02/13/19 1200)    Mobility  Level of Mobility: Level II (02/13/19 0800)  Activity Performed: Unable to mobilize (02/13/19 0800)  Reason Not Mobilized: Unstable condition (02/13/19 0800)  Mobilization Comments: hypotension, on pressor support (02/12/19 2000)    Events/Summary/Plan: Pt taken to OR returned intubated and placed on a vent.

## 2019-02-14 NOTE — PROGRESS NOTES
"Dorene Montana, sister to pt, called and spoke to RN. RN updated her on pts status/ prognosis. Dorene agrees on comfort care measures and said \"my sister would not want to be kept alive like this.\" Sam WESTON spoke to pt regarding mortuary and burial information. Dr. Lambert updated on convo with Dorene. Moving forward with comfort care measures.     Dorene Boyle contact [phone].   "

## 2019-02-14 NOTE — DISCHARGE PLANNING
Anticipated Discharge Disposition: cc     Action: NOK contact.  Attempted NOK contact left message for Vincent Montano [phone] requesting she call this LSW. TC to, Channing Fuentes [phone]. Prompt indicated number no longer in service.      Barriers to Discharge: none    Plan: assist as needed.

## 2019-02-14 NOTE — CONSULTS
"Ethics consult    Ethical Issue:  An ethics consultation was requested for the patient by the ICU provider, Dr Lambert. The ethical issues are around the potential non-beneficiary treatment and goals of care for an unrepresented patient.    Reason for admission and Medical indications:  This patient is a 58 year old female with medical hx of type 2 diabetes mellitus on insulin, atrial fibrillation and chronic smoking who was admitted on 2/12/2019 after being found down at home after 2-3 days with altered mentation and was found to be in DKA. She was admitted to the ICU for further care and while there she was found to have ischemic bowel. An exploratory laparotomy was performed 2/13/19 which demonstrated extensive infarction of the ileum and ascending colon and per surgeon Dr Escobar \"given the extent of the injury and patients' co-mordities no resection was performed\".  The patient remains in the ICU, encephalopathic, septic, in renal failure and supported on   triple pressors and not a surgical candidate.Due to dismal prognosis the  Patients code status was changed, appropriately by the treatment team to DNAR/DNI.     Code Status at time of ethics consultation request:  DNAR/DNI    Patient's Decision Making Capacity:  Per the medical team, the patient lacks decision making capacity.    Advance Directive/POLST:  None    Social History/Living Situation Prior to Hospitalization:  There was an 85-year-old male she was living with who initially described himself as her  but then later said they just lived together. He requires significant care as well and social work sent a wellness check yesterday.    No known history of alcohol or drug use. Known chronic tobacco smoking.    Discussions with Surrogate/Family/Patient Representative:  Medical team and social workers have been unable to contact the patient's family, there was one phone number for a sister in [place] documented by an RN during a previous " "admission but this number has been disconnected. Further complicated by this patient's social security number not matching her name.     Contextual Issues:  The patient does not have decision making capacity. She has no advanced directive or POLST. Attempts to contact and find family have been unsuccessful    Ethic Principles Relevant to Consultation:  Beneficence: Beneficience, in the field of bioethics, is generally viewed as the ethical and professional obligation of health care providers to do good, or, as otherwise stated, act in the patient's best interests. Determining a patient's best interests requires investigation into the patient's values and preferences, when time and information allows, rather than a presumption of what a \"reasonable person\" may want done in a similar situation. In this case, acting in the patient's best interests includes due diligence with respect to finding potential agents or surrogate to assist in medical decision making for the unrepresented patient as well as providing clinically indicated treatment.   Non-malficence: Non-malficence or \"do no harm\" is generally viewed in the field of bioethics as the ethical and professional obligation of health care providers to not intentionally cause harm to patients. Beneficence and non-malficence must be considered by health care providers, and is often evaluated as a balance between benefit vs harm. In this case, the medical team has expressed concern about the appropriateness of providing CPR given her non-resectable ischemic bowel and critical illness, encephalopathy, sepsis, renal failure, uncontrollable DKA and requirement of 3 pressors. It is ethically appropriate to have changed the patient's code status to DNAR/DNI and further to transition her care to the comfort care model given the high risk of harm vs the low likelihood of recovery.Her prognosis is clearly dismal. In addition she is now requiring high doses of fentanyl " continuous infusion to manage severe abdominal pain.Any further attempts to prolong her life will merely prolong her suffering.    Recommendations:  1. Agree with changing code status to DNAR/DNI  2. Recommend transition to comfort care at this time and maximize quality of dying with appropriate medications for pain and EOL.  3. Recommend continuing efforts to locate and contact family in order to inform them of their loved ones passing..

## 2019-02-14 NOTE — ASSESSMENT & PLAN NOTE
This is severe sepsis with the following associated acute organ dysfunction(s): acute kidney failure, acute respiratory failure, metabolic/septic encephalopathy.    Etiology ischemic bowel by exam and by CT and subsequently by ex lap  Cultures pending  Lactic acid level initially had been trending down although still elevated  Fluid resuscitation ongoing via sepsis protocols  Titration of pressors ongoing, Ashish-Synephrine/norepinephrine/vasopressin  Cortisol level nearly 100, will hold on IV steroids at this time  IV antibiotics: Ceftriaxone/metronidazole    Prognosis extremely poor going into the OR, postop prognosis approaching futility

## 2019-02-14 NOTE — PROGRESS NOTES
Critical Care Progress Note    Date of admission  2/12/2019    Chief Complaint  58 y.o. female admitted 2/12/2019 with severe hyperglycemia/DKA associated with severe dehydration/hypotension    Hospital Course    58 y.o. female who presented 2/12/2019 with past medical history significant for diabetes and hypertension who was found down by unknown persons for approximately 3 days confused.  She was found to have a glucose greater than 600 and patient with rapid ventricular response.  In the emergency department she was found to be in DKA and with IV fluids her heart rate started to improve and she converted back into normal sinus rhythm.  She is unable to provide any history at this time given her altered mentation (Admit HPI from Dr Gonda).     Interval Problem Update  Reviewed last 24 hour events:    Awake but not following  Remains on triple pressors  Remains on ventilator no SPT  Social work and ethics team evaluating this a.m.   track down roommate who initially claimed to be the  but subsequently stated he was a friend that was living with our patient and she was caring for him  Social work worried about his safety and requested 's to do a well visit  Discussed case at length with Dr. Wood after ethics consult  Reviewed the case again with Dr. Berhane Escobar  Case ICU team  All in agreement to proceed to comfort care including ethics team  Just prior to this can tending the ventilator and pressors we actually were able to track down a sister in [place] who also agreed to comfort care measures after having the entire current medical status explained to her at length    Reviewed closely with the resident/RN/clinical pharmacist and comfort care orders placed     YESTERDAY  Surgical consult this AM noted - nonoperative - suggests CC  CT ABD/pelvis reviewed - suggestive of isch bowel/obstruction    Examined patient very early a.m. with my associate who was on my call   Patient was  following commands although was definitely encephalopathic  Renal function mildly improved, urine output picking up with fluid resuscitation although very positive over 10 L  Blood sugar down from 1700-700 on insulin drip  Acidosis actually improved although lactic acid still elevated at 3.3  Patient ventilating and oxygenating acceptably by blood gas with a pH of 7.36 on 4 L nasal cannula  No family available, patient not appropriate to obtain consent for surgery or to change CODE STATUS  Patient on 3 pressors, norepinephrine/vasopressin/Ashish-Synephrine  Reviewed case with Dr. Escobar by phone at great length  We both agreed prognosis very poor and that she probably had ischemic bowel by CT which I reviewed with him  Patient only 58 and primary comorbidity diabetes  He agreed to do a ex lap and have significant ischemic bowel present close her up and bring her back to the ICU while still trying to find family and then offer comfort care    Dr. Escobar subsequently called me by phone from the operating room  Half the small intestine and half the large intestine had severe ischemia and was clearly dead  He felt the extent of ischemia was terminal and felt it inappropriate to proceed beyond just inspection and I agreed  Case reviewed at length with Dr. beard in regards to CODE STATUS and we both agree she should be a DNR  Case reviewed with resident and ICU team at length and all agree with Dr. Escobar and my decision    Case management has been unsuccessful and tracking down family or POA  Case management/ subsequently reported the patient's name and also secured number may be wrong and further investigation is ongoing  Nursing staff received a call from someone at identifying himself as the patient's ?   attempted to contact him, story from the ER was that he was some when she was the caregiver for not the ?    Patient seen postoperatively when she arrived back in the ICU  Patient  on triple pressors still  Urine output marginal  Patient came much more tachycardic  Patient less responsive but on the ventilator and just had received some anesthesia obviously    Patient starting to grimace and experienced pain but not been able to respond appropriately  Fentanyl infusion initiated for comfort/analgesia  Reviewed case again with surgery and team  We will not escalate care i.e. not add bicarbonate drip, transfuse blood products, escalate pressors or initiate additional care such as dialysis  Care appears like it is heading towards futility, ethics consult requested earlier in the day with assistance social workers pending at this time and family/POA are even the gentleman who called earlier and still not been reached by  or case management    Patient become more tachycardic, appears to be sinus, narcotic infusion is now started hopefully that will help    Review of Systems  Review of Systems   Unable to perform ROS: Intubated        Vital Signs for last 24 hours   Temp:  [38.5 °C (101.3 °F)] 38.5 °C (101.3 °F)  Pulse:  [] 142  Resp:  [12-46] 14  BP: (112)/(55) 112/55  SpO2:  [74 %-100 %] 99 %    Hemodynamic parameters for last 24 hours  CVP:  [0 MM HG-5 MM HG] 5 MM HG    Respiratory Information for the last 24 hours  Treadwell Vent Mode: APVCMV  Rate (breaths/min): 20  Vt Target (mL): 310  PEEP/CPAP: 8  FiO2: 40  P MEAN: 11  Control VTE (exp VT): 440    Physical Exam   Physical Exam   Constitutional: She appears lethargic. She appears distressed.   Appears older than stated age   HENT:   Head: Normocephalic and atraumatic.   Eyes: Pupils are equal, round, and reactive to light. No scleral icterus.   Neck: Neck supple. Normal carotid pulses and no JVD present.   Cardiovascular: Regular rhythm and intact distal pulses.   No extrasystoles are present. Tachycardia present.  Exam reveals no gallop.    No murmur heard.  Pulmonary/Chest: No accessory muscle usage. No respiratory  distress. She has no wheezes. She has rhonchi. She has no rales.   Abdominal: She exhibits distension. She exhibits no mass. There is no hepatosplenomegaly. There is generalized tenderness. There is rebound and guarding. There is no rigidity and no CVA tenderness.   Musculoskeletal: She exhibits no edema.   Neurological: She appears lethargic. She displays normal reflexes. No cranial nerve deficit or sensory deficit. She exhibits normal muscle tone. GCS eye subscore is 4. GCS verbal subscore is 1. GCS motor subscore is 6.   Skin: Skin is warm and dry. No cyanosis. Nails show no clubbing.   Some mottling seen in the lower extremities early in a.m.   Psychiatric:   Unable to assess   Repeat examination performed, no changes    Medications  Current Facility-Administered Medications   Medication Dose Route Frequency Provider Last Rate Last Dose   • metroNIDAZOLE (FLAGYL) IVPB 500 mg  500 mg Intravenous Q8HRS Zev Loo Jr., D.O. 100 mL/hr at 02/14/19 0501 500 mg at 02/14/19 0501   • fentaNYL (SUBLIMAZE) injection  mcg   mcg Intravenous Q HOUR PRN Zev Loo Jr., D.O.   100 mcg at 02/13/19 1644   • vasopressin (VASOSTRICT) 20 Units in  mL Infusion  0.03 Units/min Intravenous Continuous Zev Loo Jr., D.O. 9 mL/hr at 02/14/19 0244 0.03 Units/min at 02/14/19 0244   • norepinephrine (LEVOPHED) 8 mg in  mL Infusion  0-30 mcg/min Intravenous Continuous Sam Lambert M.D. 26.3 mL/hr at 02/14/19 0334 14 mcg/min at 02/14/19 0334   • phenylephrine (GARY-SYNEPHRINE) 40,000 mcg in  mL Infusion  1-300 mcg/min Intravenous Continuous Sam Lambert M.D. 112.5 mL/hr at 02/14/19 0406 300 mcg/min at 02/14/19 0406   • ondansetron (ZOFRAN) syringe/vial injection 4 mg  4 mg Intravenous Q4HRS PRN Sam Lambert M.D.       • fentaNYL (SUBLIMAZE) 50 mcg/mL in 50mL (Continuous Infusion)   Intravenous Continuous Sam Lambert M.D. 3 mL/hr at 02/14/19 0215 150 mcg/hr at 02/14/19 0215    • acetaminophen (TYLENOL) suppository 650 mg  650 mg Rectal Q6HRS PRN America Matthew M.D.   650 mg at 02/13/19 2255   • insulin regular human (HUMULIN/NOVOLIN R) 62.5 Units in  mL infusion per protocol  0-29 Units/hr Intravenous Continuous Zev Loo Jr., D.O. 10 mL/hr at 02/14/19 0445 2.5 Units/hr at 02/14/19 0445   • dextrose 50% (D50W) injection 25-50 mL  12.5-25 g Intravenous PRN Zev Loo Jr., D.O.       • lactated ringers infusion   Intravenous Continuous Zev Loo Jr., D.O. 100 mL/hr at 02/13/19 2322     • senna-docusate (PERICOLACE or SENOKOT S) 8.6-50 MG per tablet 2 Tab  2 Tab Oral BID Ike Prado M.D.   Stopped at 02/12/19 1800    And   • polyethylene glycol/lytes (MIRALAX) PACKET 1 Packet  1 Packet Oral QDAY PRN Ike Prado M.D.        And   • magnesium hydroxide (MILK OF MAGNESIA) suspension 30 mL  30 mL Oral QDAY PRN Ike Prado M.D.        And   • bisacodyl (DULCOLAX) suppository 10 mg  10 mg Rectal QDAY PRN Ike Prado M.D.       • Respiratory Care per Protocol   Nebulization Continuous RT Ike Prado M.D.       • cefTRIAXone (ROCEPHIN) 1 g in  mL IVPB  1 g Intravenous Q24HRS Jeremy M Gonda, M.D.   Stopped at 02/13/19 0656   • heparin injection 5,000 Units  5,000 Units Subcutaneous Q8HRS Ike Prado M.D.   Stopped at 02/14/19 0600       Fluids    Intake/Output Summary (Last 24 hours) at 02/14/19 0612  Last data filed at 02/14/19 0400   Gross per 24 hour   Intake          9334.21 ml   Output             1040 ml   Net          8294.21 ml       Laboratory  Recent Labs      02/13/19   0534  02/13/19   1609   ISTATAPH  7.381*  7.288*   ISTATAPCO2  31.1  34.7   ISTATAPO2  59*  110*   ISTATATCO2  19*  18*   XRBGGPI8BKW  90*  98   ISTATARTHCO3  18.4  16.6*   ISTATARTBE  -6*  -9*   ISTATTEMP  100.8 F  102.0 F   ISTATFIO2  28  70   ISTATSPEC  Arterial  Arterial   ISTATAPHTC  7.363*  7.261*    [id number]  64  122*     Recent Labs      02/12/19   1105  02/12/19   1437   CPKTOTAL  135  128   TROPONINI  0.04   --      Recent Labs      02/13/19 0353 02/13/19 1245 02/13/19   1800  02/14/19   0445   SODIUM  144   < >  143  145  145   POTASSIUM  4.7   < >  5.1  4.8  4.2   CHLORIDE  104   < >  110  115*  118*   CO2  22   < >  20  21  17*   BUN  82*   < >  86*  85*  76*   CREATININE  2.70*   < >  2.49*  2.41*  2.09*   MAGNESIUM  2.0   --   1.9   --   1.5   PHOSPHORUS  3.4   --   3.9   --   2.0*   CALCIUM  7.6*   < >  7.2*  7.4*  7.7*    < > = values in this interval not displayed.     Recent Labs      02/12/19 1801 02/12/19 1945 02/12/19 2249 02/13/19 0353 02/13/19 1245 02/13/19   1800  02/14/19 0445   ALTSGPT  10  8   --    --    --   7   --    --    --    --    ASTSGOT  15  14   --    --    --   18   --    --    --    --    ALKPHOSPHAT  127*  106*   --    --    --   86   --    --    --    --    TBILIRUBIN  0.6  0.9   --    --    --   0.7   --    --    --    --    LIPASE   --    --    --   586*   --    --    --    --    --    --    GLUCOSE  1255*  981*   < >  862*   < >  700*   < >  623*  432*  245*    < > = values in this interval not displayed.     Recent Labs      02/12/19   1105 02/12/19 1801 02/12/19 1945 02/12/19 2048 02/13/19 0353   WBC  10.5   --    --    --   4.7*  4.9   NEUTSPOLYS  73.40*   --    --    --    --   71.80   LYMPHOCYTES  16.40*   --    --    --    --   11.90*   MONOCYTES  8.90   --    --    --    --   1.80   EOSINOPHILS  0.10   --    --    --    --   0.00   BASOPHILS  0.40   --    --    --    --   1.80   ASTSGOT  14   < >  15  14   --   18   ALTSGPT  12   < >  10  8   --   7   ALKPHOSPHAT  169*   < >  127*  106*   --   86   TBILIRUBIN  1.2   < >  0.6  0.9   --   0.7    < > = values in this interval not displayed.     Recent Labs      02/12/19   1105  02/12/19   2048  02/13/19   0353   RBC  5.23  3.99*  4.10*   HEMOGLOBIN  13.5  10.3*  10.6*    HEMATOCRIT  51.5*  32.1*  32.5*   PLATELETCT  331  214  206   PROTHROMBTM  15.7*   --    --    APTT  27.1   --    --    INR  1.24*   --    --        Imaging  X-Ray:  I have personally reviewed the images and compared with prior images.  EKG:  I have personally reviewed the images and compared with prior images.  CT:    Reviewed    Assessment/Plan  * Septic shock (HCC) likely in setting of ischemic bowel   Assessment & Plan    This is severe sepsis with the following associated acute organ dysfunction(s): acute kidney failure, acute respiratory failure, metabolic/septic encephalopathy.    Etiology ischemic bowel by exam and by CT and subsequently by ex lap  Cultures pending  Lactic acid level initially had been trending down although still elevated  Fluid resuscitation ongoing via sepsis protocols  Titration of pressors ongoing, Ashish-Synephrine/norepinephrine/vasopressin  Cortisol level nearly 100, will hold on IV steroids at this time  IV antibiotics: Ceftriaxone/metronidazole    Prognosis extremely poor going into the OR, postop prognosis approaching futility     Acute respiratory failure with hypoxia (HCC)   Assessment & Plan    Intubated in OR 2/13 for ex lap  Had been developing progressive hypoxemia with fluid resuscitation preop, monitoring for ARDS  Serial ABG/chest x-ray/ventilator mechanics review  Not appropriate for liberation trial  RT protocols  Ventilator bundle     DKA (diabetic ketoacidoses) (HCC)- (present on admission)   Assessment & Plan    Unclear cause, most likely secondary to noncompliance given historical reasons versus UTI versus other  DKA protocol with ongoing insulin drip  Aggressive fluid resuscitation ongoing  N.p.o.  Close monitoring of glucose as well as electrolytes, anion gap, bicarb  Hypoglycemia protocols     Paroxysmal atrial fibrillation (HCC)   Assessment & Plan    Presumed to be new onset  Rate controlled with fluids  Hold Xarelto, just provide prophylactic subcu  heparin  Consider beta-blocker or calcium channel blocker if he goes into RVR again as blood pressure will allow  Initially trying to switch from norepinephrine to Ashish-Synephrine/vasopressin to help with blood pressure without exacerbating tachycardia  IV digoxin or even IV amiodarone to help with heart rate if blood pressure remains an issue     Acute cystitis with hematuria   Assessment & Plan    Empiric antibiotics, follow-up on urine culture/blood culture     Hyponatremia   Assessment & Plan    Pseudohyponatremia, corrected sodium on admit 166  Monitor with fluid resuscitation and glucose control     Acute renal failure with acute tubular necrosis superimposed on stage 3 chronic kidney disease (HCC)   Assessment & Plan    Secondary to ATN, dehydration  Query chronic kidney disease related to diabetes/hypertension  Aggressive fluid resuscitation ongoing  Monitor kidney function and urine output closely, creatinine actually improved today versus yesterday a.m. although urine output marginal  Monitor electrolytes and replace as needed  Patient not a good candidate for dialysis  With operative findings would not escalate care to renal replacement therapy     Metabolic encephalopathy- (present on admission)   Assessment & Plan    Secondary to hyperosmolar state, acute kidney injury and diabetic ketoacidosis initially  Subsequently felt secondary to sepsis syndrome as well  Avoid sedatives as tolerated  Every 4 hours neuro checks  Aspiration precautions     Hyperosmolarity due to secondary diabetes mellitus (HCC)- (present on admission)   Assessment & Plan    IV fluid resuscitation  Corrected serum sodium with initial blood work 166  Patient profoundly dehydrated on arrival, water deficit greater > 6 L     Lactic acidosis   Assessment & Plan    Secondary to DKA/sepsis  Monitor with fluid resuscitation       Depression- (present on admission)   Assessment & Plan    Resume Cymbalta when clinically appropriate      History of DVT (deep vein thrombosis)- (present on admission)   Assessment & Plan    Prophylactic subcu heparin  Monitor     Hyperlipidemia- (present on admission)   Assessment & Plan    Continue atorvastatin when clinically appropriate     Hypertension- (present on admission)   Assessment & Plan    History of hypertension, hypertensive now, holding all medicines remain low blood pressure    Resume outpatient antihypertensives once able to safely swallow          VTE:  Heparin  Ulcer: H2 Antagonist  Lines: Central Line  Ongoing indication addressed, Arterial Line  Ongoing indication addressed and Walls Catheter  Ongoing indication addressed    I have performed a physical exam and reviewed and updated ROS and Plan today (2/14/2019). In review of yesterday's note (2/13/2019), there are no changes except as documented above.     Discussed patient condition and risk of morbidity and/or mortality with RN, RT, Pharmacy, , , Code status disscussed, Charge nurse / hot rounds, general surgery and Ethics MD     Will move to comfort care today, see extensive orders and extensive discussions outlined above as well as in ethics notes,  notes etc.

## 2019-02-14 NOTE — PROGRESS NOTES
"[facility] ICU Progress Note      Admit Date: 2/12/2019  ICU Day: 2    Resident(s): Frank Trujillo  Attending: COLE CAR/ Dr. Lambert     Date & Time:   2/14/2019   8:59 AM       Patient ID:    Name:             Vin Maciel     YOB: 1960  Age:                 58 y.o.  female   MRN:               [id number]    HPI:   Patient is a 58-year-old female with a past medical history of GERD, esophagitis, degenerative lumbar disc disease, type 2 diabetes mellitus on insulin, history of hyperlipidemia, decubitus ulcer on the sacral region stage II, history of chronic pain syndrome and insomnia, atrial fibrillation and chronic smoking, as per charts, presents to the emergency department of the [facility] on 2/12/2019 after being found on the floor with altered mentation, apparently for about 2-3 days.  Admitted for DKA, course complicated by ischemic bowel not amenable to resection        Consultants:  Surgery: Dr. Escobar  PMA: Dr Lambert        Interval Events:  Currently intubated  Patient has lethal Injury: Infarcted bowel   Possible ethics consult today, and making her comfort care  Worsening leukocytosis  Electrolyte repletion from    Till change to confort care today     Review of Systems   Unable to perform ROS: Intubated       PHYSICAL EXAM  Vitals:    02/14/19 0630 02/14/19 0645 02/14/19 0700 02/14/19 0722   BP:       Pulse: (!) 139 (!) 139 (!) 138    Resp: 14 14 14    Temp:       TempSrc:       SpO2: 100% 98% 99% 99%   Weight:       Height:         Body mass index is 25.93 kg/m².  /55   Pulse (!) 138   Temp (!) 38.5 °C (101.3 °F) (Walls)   Resp 14   Ht 1.575 m (5' 2\")   Wt 64.3 kg (141 lb 12.1 oz)   SpO2 99%   BMI 25.93 kg/m²   O2 therapy: Pulse Oximetry: 99 %, O2 (LPM): 5, FiO2%: 50 %, O2 Delivery: Ventilator    Physical Exam  General:  Intubated, sedated  HEENT: Normocephalic, atraumatic, no jaundice or scleral icterus, no pallor,  Respiratory: Decreased air entry at lung bases, no " rales  Cardiac: Tachycardic, S1/S2 present, no M/R/G  GI: Distended abdomen,  Neuro: Intubated,  Msk/Extremities: radial, dorsalis pedis pulses 2+b/l, no joint swelling or redness,     Respiratory:  Treadwell Vent Mode: APVCMV  Respiration: 14, Pulse Oximetry: 99 %    Chest Tube Drains:    Recent Labs      02/13/19   0534  02/13/19   1609   ISTATAPH  7.381*  7.288*   ISTATAPCO2  31.1  34.7   ISTATAPO2  59*  110*   ISTATATCO2  19*  18*   [id number]  90*  98   ISTATARTHCO3  18.4  16.6*   ISTATARTBE  -6*  -9*   ISTATTEMP  100.8 F  102.0 F   ISTATFIO2  28  70   ISTATSPEC  Arterial  Arterial   ISTATAPHTC  7.363*  7.261*   [id number]  64  122*       HemoDynamics:  Pulse: (!) 138, Heart Rate (Monitored): (!) 135 Blood Pressure: 112/55, Arterial BP: 136/63, NIBP: 132/63 CVP (mm Hg): 5 MM HG    Neuro:      Fluids:        Intake/Output Summary (Last 24 hours) at 02/14/19 0859  Last data filed at 02/14/19 0600   Gross per 24 hour   Intake          9134.58 ml   Output             1110 ml   Net          8024.58 ml          Body mass index is 25.93 kg/m².    Recent Labs      02/13/19   0353   02/13/19   1245  02/13/19   1800  02/14/19   0445   SODIUM  144   < >  143  145  145   POTASSIUM  4.7   < >  5.1  4.8  4.2   CHLORIDE  104   < >  110  115*  118*   CO2  22   < >  20  21  17*   BUN  82*   < >  86*  85*  76*   CREATININE  2.70*   < >  2.49*  2.41*  2.09*   MAGNESIUM  2.0   --   1.9   --   1.5   PHOSPHORUS  3.4   --   3.9   --   2.0*   CALCIUM  7.6*   < >  7.2*  7.4*  7.7*    < > = values in this interval not displayed.       GI/Nutrition:  Recent Labs      02/12/19   1801  02/12/19   1945   02/12/19   2249   02/13/19   0353   02/13/19   1245  02/13/19   1800  02/14/19   0445   ALTSGPT  10  8   --    --    --   7   --    --    --    --    ASTSGOT  15  14   --    --    --   18   --    --    --    --    ALKPHOSPHAT  127*  106*   --    --    --   86   --    --    --    --    TBILIRUBIN  0.6  0.9   --    --    --   0.7   --    --     --    --    LIPASE   --    --    --   586*   --    --    --    --    --    --    GLUCOSE  1255*  981*   < >  862*   < >  700*   < >  623*  432*  245*    < > = values in this interval not displayed.       Heme:  Recent Labs      195   RBC  5.23  3.99*  4.10*  3.86*   HEMOGLOBIN  13.5  10.3*  10.6*  10.0*   HEMATOCRIT  51.5*  32.1*  32.5*  30.8*   PLATELETCT  331  214  206  128*   PROTHROMBTM  15.7*   --    --    --    APTT  27.1   --    --    --    INR  1.24*   --    --    --        Infectious Disease:  Monitored Temp 2  Av.1 °C (102.4 °F)  Min: 37.9 °C (100.2 °F)  Max: 39.8 °C (103.6 °F)  Temp  Av.5 °C (101.3 °F)  Min: 38.5 °C (101.3 °F)  Max: 38.5 °C (101.3 °F)  Recent Labs      19   0445   WBC  10.5   --    --    --   4.7*  4.9  12.4*   NEUTSPOLYS  73.40*   --    --    --    --   71.80  73.00*   LYMPHOCYTES  16.40*   --    --    --    --   11.90*  11.30*   MONOCYTES  8.90   --    --    --    --   1.80  3.50   EOSINOPHILS  0.10   --    --    --    --   0.00  0.00   BASOPHILS  0.40   --    --    --    --   1.80  0.00   ASTSGOT  14   < >  15  14   --   18   --    ALTSGPT  12   < >  10  8   --   7   --    ALKPHOSPHAT  169*   < >  127*  106*   --   86   --    TBILIRUBIN  1.2   < >  0.6  0.9   --   0.7   --     < > = values in this interval not displayed.       Meds:  • K+ Scale: Goal of 4.5  1 Each     • metroNIDAZOLE (FLAGYL) IV  500 mg Stopped (19)   • fentaNYL   mcg     • vasopressin (PITRESSIN) infusion  0.03 Units/min 0.03 Units/min (19 0244)   • NORepinephrine (LEVOPHED) infusion  0-30 mcg/min 5 mcg/min (19 0730)   • Phenylephrine infusion  1-300 mcg/min 300 mcg/min (19 0815)   • ondansetron  4 mg     • fentaNYL   200 mcg/hr (19 06)   • acetaminophen  650 mg     • insulin infusion for 150 protocol  0-29  Units/hr 3 Units/hr (02/14/19 0852)   • dextrose 50%  12.5-25 g     • LR   100 mL/hr at 02/13/19 2322   • senna-docusate  2 Tab      And   • polyethylene glycol/lytes  1 Packet      And   • magnesium hydroxide  30 mL      And   • bisacodyl  10 mg     • Respiratory Care per Protocol       • cefTRIAXone (ROCEPHIN) IV  1 g Stopped (02/14/19 0648)   • heparin  5,000 Units          Procedures:  Right IJ: 2/12  Exploratory laparotomy: 2/13  Intubation: 2/13    Imaging:  CT-ABDOMEN-PELVIS W/O   Final Result         1. Findings highly suggestive of bowel ischemia (dilated, thickened small bowel loops, small bowel pneumatosis and portal venous gas). Small bowel dilatation suggests that there may be obstruction, with transition somewhere in the ileum.      2. Small pockets of extraluminal area adjacent to the proximal colon may represent free air or air in the mesenteric veins.      3. Small volume ascites.      Findings were discussed with LORENZO MONROY JR on 2/13/2019 1:13 AM.      DX-CHEST-PORTABLE (1 VIEW)   Final Result      1.  Supportive tubing as described above.   2.  Mild worsening hypoinflation.      DX-CHEST-PORTABLE (1 VIEW)   Final Result      Right central line projects over the SVC. No pneumothorax.         CT-HEAD W/O   Final Result      No acute intracranial abnormality is identified.      DX-CHEST-PORTABLE (1 VIEW)   Final Result      No acute cardiopulmonary process is seen.      Atherosclerotic plaque.          Problem and Plan:      * Septic shock (HCC) likely in setting of ischemic bowel   Assessment & Plan    This is severe sepsis with the following associated acute organ dysfunction(s): acute kidney failure.  Currently on 3 pressors  Antibiotics  IV fluids  Will likely transition to comfort care in the next 24 hours     Acute respiratory failure with hypoxia (HCC)   Assessment & Plan    Intubated and sedated  Respiratory protocol for now     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 fib    Assessment & Plan    Noted to have acute abdomen overnight CT showed ischemic bowel  Exploratory laparotomy shows infarcted ileum and ascending colon not amenable to resection  CODE STATUS changed to DNI DNR  Comfort care measures after family has been contacted or after ethics evaluation  Continue antibiotics: Rocephin and Flagyl  IV fluids  Surgery signed off       DKA (diabetic ketoacidoses) (HCC)- (present on admission)   Assessment & Plan    Noted to have blood glucose in the thousand 1700s,  Patient likely having hyperosmolar nonketotic state  Lactic acidosis  DKA protocol started  Persistent lactic acidosis, blood glucose difficult to control likely in setting of ongoing infarcted bowel  - Continue DKA protocol for now  -Effort to contact family and change patient's management to comfort care  -Treatment of abdominal infection for now       Paroxysmal atrial fibrillation (HCC)   Assessment & Plan    Rate controlled, on Xarelto.  We will resume when appropriate     Acute cystitis with hematuria   Assessment & Plan    Dirty urine  Currently on ceftriaxone which will cover any UTI     Hyponatremia   Assessment & Plan    Resolved     Acute renal failure with acute tubular necrosis superimposed on stage 3 chronic kidney disease (HCC)   Assessment & Plan    Creatinine 2.09 baseline unknown  Likely in setting of DKA  -IV hydration  - Will avoid nephrotoxic medications for now     Metabolic encephalopathy- (present on admission)   Assessment & Plan    In setting of DKA  Treatment of DKA       Lactic acidosis   Assessment & Plan    Lactic acid elevated at 4.8,   Persistent lactic acidosis, not likely to resolve given ongoing infected bowel  No need to trend     History of DVT (deep vein thrombosis)- (present on admission)   Assessment & Plan    Has a history of DVT, will continue Xarelto when appropriate     Hypertension- (present on admission)   Assessment & Plan    Patient to be closely monitored for volume  overload and to start IV metoprolol/hydralazine if systolic blood pressure greater than 160.         DISPO: icu     CODE STATUS: DNI DNR     Quality Measures:  Walls Catheter: Yes  DVT Prophylaxis: Heparin  Antibiotics: Ceftriaxone and Flagyl  Lines: Right IJ, PIV

## 2019-02-14 NOTE — DISCHARGE PLANNING
"Anticipated Discharge Disposition: CC    Action: Emergency Contact    TC to pt's house mate, Ki [phone]. Unable to leave message due to prompt stating \"mailbox id full\"     Barriers to Discharge: none    Plan: assist as needed.       "

## 2019-02-14 NOTE — CARE PLAN
Problem: Communication  Goal: The ability to communicate needs accurately and effectively will improve  Outcome: PROGRESSING SLOWER THAN EXPECTED  Unable to contact family to update on plan of care. SW and ethics involved.     Problem: Bowel/Gastric:  Goal: Normal bowel function is maintained or improved  Outcome: PROGRESSING SLOWER THAN EXPECTED  Ischemic bowel; not a surgical candidate for intervention per MD notes.

## 2019-02-14 NOTE — PROGRESS NOTES
Current FSBS 119. RN discussed starting ICU Insulin infusion with pharmacist. Per Pharm, wait until FSBS gets above 180 to start new drip, continue to monitor blood sugars every hour.

## 2019-02-14 NOTE — PROGRESS NOTES
Pt eyes open; nodding yes multiple times when asked if in pain. Repositioned pt for comfort and pt threw arms up in pain. Dr. Lambert notified/ aware in rounds; verbal order taken to increase fent gtt to max 400; see mar. SW attempting to find family to get in contact regarding pt status. Palliative/ hospice consulted. Poor prognosis. MDs advise comfort care; RN and charge RN agree. Plan to move to comfort care orders this afternoon.

## 2019-02-15 NOTE — PROGRESS NOTES
Dorene Boyle  notified at ~1810 of patient's passing. Dorene said she has Sam, [employer], number and plans to call him in the morning to set up a mortuary.

## 2019-02-15 NOTE — PROGRESS NOTES
2 RN pronouncement of death by Stefani Thompson and Catrina Myers at 1600. Dr. Lambert notified at 1615. NAM notified at 1630. Called placed to notify Dorene, pts sister, abhijit esposito x3.

## 2019-02-15 NOTE — PROGRESS NOTES
Restraints d/c'ed at 1530. Comfort measures started, pressors stopped ~1535. Faith RT extubated pt at 1540. Fentanyl gtt cont for pt comfort. RN cont to monitor.

## 2019-02-15 NOTE — DISCHARGE SUMMARY
Death Summary    Cause of Death  Acute hypoxic respiratory failure due Septic shock and DKA  due to Infarcted ileum and ascending colon due to Atrial fibrillation.    Comorbid Conditions at the Time of Death  Principal Problem:    Septic shock (HCC) likely in setting of ischemic bowel POA: Unknown  Active Problems:    DKA (diabetic ketoacidoses) (HCC) POA: Yes    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 fib POA: Unknown    Acute respiratory failure with hypoxia (HCC) POA: Unknown    Hyperosmolarity due to secondary diabetes mellitus (HCC) POA: Yes    Metabolic encephalopathy POA: Yes    Acute renal failure with acute tubular necrosis superimposed on stage 3 chronic kidney disease (HCC) POA: Unknown    Hyponatremia POA: Unknown    Acute cystitis with hematuria POA: Unknown    Paroxysmal atrial fibrillation (HCC) POA: Unknown    Hypertension POA: Yes    Hyperlipidemia POA: Yes    History of DVT (deep vein thrombosis) POA: Yes    Depression POA: Yes    Lactic acidosis POA: Unknown  Resolved Problems:    * No resolved hospital problems. *      History of Presenting Illness and Hospital Course  This is a 58 y.o. female admitted 2/12/2019 with acute hypoxic respiratory failure, DKA, severe sepsis,  After patient was  being found on the floor with altered mentation, apparently for about 2-3 days. He was found to be extremely dehydrated,, confused and non responsive. There was no evidence of trauma on presentation. Her blood glucose was noted to be 1771, and urine positive for ketones. She was also noted to be in renal failure. She was found to be hypotensive requiring pressors to maintain blood pressure.   Was started on antibiotics, and DKA protocol started.    Patient noted to have severe abdominal pain, and there was concern for acute abdomen. CT abdomen was consistent with ischemic bowel. She was noted to also have elevated lactic acid.   Patient eventually sent for exploratory laparotomy, and noted to have  infarcted ileum and ascending colon. This was not amenable to resection, and hence patient was closed up, and brought back to ICU intubated.  All efforts to reach family was futile, and the treatment team decided to change patient code status from full to DNI/DNAR. The ethic consult was initiated. The decision was to make patient comfort care.    A family member, sister, eventually arrived, few minutes prior to patient passing, and was satisfied with the decision.  Patient passed comfortably at 4 PM, on 2/14/2019

## 2019-02-17 LAB
BACTERIA BLD CULT: NORMAL
BACTERIA BLD CULT: NORMAL
BACTERIA UR CULT: ABNORMAL
BACTERIA UR CULT: ABNORMAL
SIGNIFICANT IND 70042: ABNORMAL
SIGNIFICANT IND 70042: NORMAL
SIGNIFICANT IND 70042: NORMAL
SITE SITE: ABNORMAL
SITE SITE: NORMAL
SITE SITE: NORMAL
SOURCE SOURCE: ABNORMAL
SOURCE SOURCE: NORMAL
SOURCE SOURCE: NORMAL

## 2021-08-23 NOTE — ASSESSMENT & PLAN NOTE
Intubated in OR 2/13 for ex lap  Had been developing progressive hypoxemia with fluid resuscitation preop, monitoring for ARDS  Serial ABG/chest x-ray/ventilator mechanics review  Not appropriate for liberation trial  RT protocols  Ventilator bundle   Yes

## 2023-01-01 NOTE — ASSESSMENT & PLAN NOTE
Discharge Form    Date of Delivery: 2023; Time of Delivery: 12:00 AM   Date of Discharge: 2023    Delivery Method: , Low Transverse [251]    MOTHER  Information for the patient's mother:  Trino Marroquin [[id number]]   33 year old   OB History    Para Term  AB Living   1 1 1 0 0 1   SAB IAB Ectopic Molar Multiple Live Births   0 0 0 0 0 1        BRAN   Information for the patient's mother:  Trino Marroquin [[id number]]   2023, by Last Menstrual Period        Information for the patient's mother:  Trino Marroquin [[id number]]   No results found for this or any previous visit.       Information for the patient's mother:  Trino Marroquin [[id number]]     Current Facility-Administered Medications   Medication   • lipid rescue kit with fat emulsion (IntraLIPID, NUTRILIPID) 20 % injection 85.5 mL   • lipid rescue kit with fat emulsion (IntraLIPID, NUTRILIPID) 20 % injection 213.8 mL   • lipid rescue kit with fat emulsion (IntraLIPID, NUTRILIPID) 20 % injection 427.5 mL   • lipid rescue kit with fat emulsion (IntraLIPID, NUTRILIPID) 20 % injection 213.8 mL   • lipid rescue kit with fat emulsion (IntraLIPID, NUTRILIPID) 20 % injection 427.5 mL   • ibuprofen (MOTRIN) tablet 600 mg   • acetaminophen (TYLENOL) tablet 650 mg   • oxyCODONE (IMM REL) (ROXICODONE) tablet 5 mg   • measles-mumps-rubella vaccine 0.5 mL   • varicella virus (VARIVAX) live vaccine 0.5 mL   • diphtheria-pertussis (acellular)-tetanus (BOOSTRIX) vaccine 0.5 mL   • oxyTOCIN (PITOCIN) 30 Units in sodium chloride 0.9% 500 mL   • nalbuphine (NUBAIN) injection 5 mg   • hydroCORTisone-pramoxine (ANALPRAM-HC) 2.5-1 % rectal cream 1 application.   • simethicone (MYLICON) tablet 125 mg   • calcium carbonate (TUMS) chewable tablet 500 mg   • ondansetron (ZOFRAN) injection 4 mg   • prochlorperazine (COMPAZINE) injection 5 mg   • docusate sodium-sennosides (SENOKOT S) 50-8.6 MG 2 tablet   • bisacodyl (DULCOLAX)  EKG reveals atrial fibrillation, rate controlled with fluids.  Will hold off on metoprolol/diltiazem for now.  Continue Xarelto.  Low threshold for starting beta-blockers/calcium channel blockers if patient goes into A. fib with RVR   suppository 10 mg   • magnesium hydroxide (MILK OF MAGNESIA) 400 MG/5ML suspension 30 mL   • lactated ringers infusion   • ferrous sulfate (65 mg Fe per 325 mg) tablet 325 mg   • ketorolac (TORADOL) injection 15 mg   • sodium chloride 0.9% infusion   • carboprost (HEMABATE) injection 250 mcg   • tranexamic acid-sodium chloride (CYKLOKAPRON) premix IVPB 1,000 mg   • oxyTOCIN (PITOCIN) injection 10 Units   • oxyTOCIN (PITOCIN) 30 Units in sodium chloride 0.9% 500 mL   • sodium chloride 0.9 % flush bag 25 mL   • sodium chloride (PF) 0.9 % injection 2 mL   • lidocaine HCl (PF) (XYLOCAINE) 1 % injection 300 mg        Prenatal labs:   Information for the patient's mother:  Trino Marroquin [[id number]]     Lab Results   Component Value Date/Time    HIV Nonreactive 2023 01:23 PM    HIV Nonreactive 10/03/2022 02:25 PM    RUBEL 213.2 10/03/2022 02:25 PM    GCPT Negative 10/03/2022 02:12 PM    CHPT Negative 10/03/2022 02:12 PM      Information for the patient's mother:  AlonTrino pradhan [[id number]]   No results found for: MERLINPIYANCI     Pregnancy complications: None    Labor  · Rupture Date: 2023  · Rupture Time: 3:00 AM  · Time of Birth: 2:41 AM    Apgars:   7  9     Feeding method: both breast and bottle      Nursery Course: Transitioned well, feeding well, good urine output and bowel movements, no complications     Screenings/ Immunizations:  [place]  Screen: done, results pending  CHD Screening Completed: Done  [place] Hearing Test Results: Pass R, Pass L     Immunizations:   Immunization History   Administered Date(s) Administered   • Hep B, adolescent or pediatric 2023       Discharge Exam:   Birth Weight: 3.3 kg (7 lb 4.4 oz)  Discharge Weight:   Wt Readings from Last 1 Encounters:   23 3.03 kg (6 lb 10.9 oz) (23 %, Z= -0.74)*     * Growth percentiles are based on WHO (Boys, 0-2 years) data.     Weight Change from Birth: -8%   PHYSICAL EXAM  VITALS:   Visit Vitals  Pulse 132   Temp 99.5 °F  (37.5 °C) (Axillary)   Resp 40   Ht 20.47\" (52 cm) Comment: Filed from Delivery Summary   Wt 3.03 kg (6 lb 10.9 oz)   HC 33.5 cm (13.19\") Comment: Filed from Delivery Summary   BMI 11.21 kg/m²     GENERAL: alert, appropriate behavior,no acute distress.   SKIN: normal turgor, pink, no rash.   No significant jaundice.  HEAD: atraumatic and normocephalic. The anterior fontanel is open and flat.  EYES: The conjunctivae appear normal, red reflexes are seen bilaterally.  EARS: Pinnae normal.  NOSE: There is no nasal flaring, nares patent bilaterally.  THROAT:  The oropharynx is normal.  There is no cleft of the palate.  Chest: Clavicles Intact. Normal Nipple placement  TRUNK AND Spine: There are no lesions on the trunk; there is no dimple over the presacral area. There are no retractions.  LUNGS: The lung fields are clear to auscultation.  HEART: The precordium is quiet. The heart rhythm is grossly regular. S1 and S2 are normal. There are no murmurs.  ABDOMEN: Soft, symmetrical, rounded, cord clear & drying, bowel sounds present, No Masses  GENITALIA: Normal male genitalia with bilateral descended testes.  EXTREMITIES: Moving all 4 extremities. The hip exam is normal. There are no hip clicks or clunks.    NEUROLOGIC: normal tone throughout. No focal deficit nl reflexes    Admission on 2023   Component Date Value   • BASE EXCESS / DEFICIT, A* 2023 -6    • HCO3, ARTERIAL CORD BLOO* 2023 22    • PCO2, ARTERIAL CORD BLOO* 2023 52    • PH, ARTERIAL CORD BLOOD * 2023    • PO2, ARTERIAL CORD BLOOD* 2023 <20    • BASE EXCESS / DEFICIT, V* 2023 -4    • HCO3, VENOUS CORD BLOOD * 2023 22    • PCO2, VENOUS CORD BLOOD * 2023 42    • PH, VENOUS CORD BLOOD - * 2023    • PO2, VENOUS CORD BLOOD -* 2023 24         Assessment:  Well 2 day old male infant born , Low Transverse [251]  Problem List Items Addressed This Visit    None       Plan:  Routine   care.   Feed on demand.   Sleep on back.   Call  PMD for no U/O for 12 hrs or rectal temp > 100.4.    Follow-up:   F/U in office in 1-2 days.  Wayne Torrez MD  8:29 AM

## 2023-11-08 NOTE — ED NOTES
Annual Medicare Wellness Visit     Patient ID: [id number]     Chief Complaint: Medicare AWV    HPI:     Evelyn Frances is a 71 y.o. female here today for a Medicare Wellness. Overall doing well. Reviewed labs, answered all questions and concerns at this time. Showed macrocytosis without anemia, low RBC count, high triglycerides, high vitamin D level, and A1c of  6.2.     Opioid Screening: Patient medication list reviewed, patient is not taking prescription opioids. Patient is not using additional opioids than prescribed. Patient is at low risk of substance abuse based on this opioid use history.     Past Medical History:   BPV (benign positional vertigo)  CAD (coronary artery disease)  Diverticulosis  Gastric reflux  Hypercholesterolemia  Hypothyroidism, unspecified  Osteopenia  Postinflammatory pulmonary fibrosis  Tinnitus, unspecified ear  Vitamin D deficiency     Past Surgical History:   Procedure Laterality Date    COLONOSCOPY  09/25/2019    ESOPHAGOGASTRODUODENOSCOPY      EYE SURGERY  1989    hemorrhoidal banding      HYSTERECTOMY      PELVIC FRACTURE SURGERY  08/31/2022     Review of patient's allergies indicates:   Allergen Reactions    Cetirizine      Other Reaction(s): Allergy    Metformin Diarrhea    Omnicef [cefdinir] Hives    Pravastatin Other (See Comments)     Other reaction(s): Myalgias (Muscle Pain)    Simvastatin Other (See Comments)     Other reaction(s): Myalgias (Muscle Pain)    Sulfa (sulfonamide antibiotics)     Amoxicillin Rash     Other reaction(s): Flushed complexion    Azelastine Other (See Comments) and Photosensitivity     Other Reaction(s): Other (See Comments)      Azithromycin Rash, Other (See Comments) and Edema     Other Reaction(s): Cutaneous eruption, Edema, Not Indicated, Other (See Comments)    Dexchlorpheniram-phenylephrine Palpitations    Sulfamethoxazole-trimethoprim Palpitations and Rash     Outpatient Medications Marked as Taking for the 11/8/23 encounter (Office Visit)  Pt AOx4.  Pt given discharge instructions and pt verbalized understanding.  Pt ambulated to [facility].   with Marely Abreu MD   Medication Sig Dispense Refill    carboxymethylcellulose (REFRESH PLUS) 0.5 % Dpet Refresh Tears 0.5 % eye drops   Apply 1 drop every day by ophthalmic route.      clobetasol 0.05% (TEMOVATE) 0.05 % Oint Apply topically.      esomeprazole (NEXIUM) 20 MG capsule       estradioL (ESTRACE) 0.01 % (0.1 mg/gram) vaginal cream Place 1 g vaginally twice a week.      fluticasone propionate (FLONASE) 50 mcg/actuation nasal spray [place] 1 spray every day by intranasal route.      hydrocortisone 2.5 % cream hydrocortisone Apply 1 application (topical) 3 times per day PRN - Itching for 7 days [id number] cream 3 times per day topical 7 days active 2.5 %      levothyroxine (SYNTHROID) 100 MCG tablet Take 1 tablet (100 mcg total) by mouth before breakfast. 90 tablet 3    multivitamin (ONE DAILY MULTIVITAMIN) per tablet Take by oral route.      rosuvastatin (CRESTOR) 10 MG tablet Crestor Take No date recorded No form recorded No frequency recorded No route recorded No set duration recorded No set duration amount recorded active No dosage strength recorded No dosage strength units of measure recorded      [DISCONTINUED] omeprazole (PRILOSEC OTC) 20 MG tablet Prilosec OTC 20 mg tablet,delayed release   Take 1 tablet every day by oral route as directed.       Social History     Socioeconomic History    Marital status:    Tobacco Use    Smoking status: Never    Smokeless tobacco: Never    Tobacco comments:      smoked in our home.   Substance and Sexual Activity    Alcohol use: Not Currently     Alcohol/week: 1.0 standard drink of alcohol     Types: 1 Drinks containing 0.5 oz of alcohol per week    Drug use: Never    Sexual activity: Not Currently     Partners: Male     Birth control/protection: Post-menopausal, Rhythm     Comment: Birth control pills     Social Determinants of Health     Financial Resource Strain: Low Risk  (6/8/2023)    Overall Financial Resource Strain (CARDIA)      Difficulty of Paying Living Expenses: Not very hard   Food Insecurity: No Food Insecurity (6/8/2023)    Hunger Vital Sign     Worried About Running Out of Food in the Last Year: Never true     Ran Out of Food in the Last Year: Never true   Transportation Needs: No Transportation Needs (6/8/2023)    PRAPARE - Transportation     Lack of Transportation (Medical): No     Lack of Transportation (Non-Medical): No   Physical Activity: Sufficiently Active (6/8/2023)    Exercise Vital Sign     Days of Exercise per Week: 5 days     Minutes of Exercise per Session: 50 min   Stress: No Stress Concern Present (6/8/2023)    [employer] [place] [employer] - Occupational Stress Questionnaire     Feeling of Stress : Only a little   Social Connections: Socially Integrated (6/8/2023)    Social Connection and Isolation Panel [NHANES]     Frequency of Communication with Friends and Family: Three times a week     Frequency of Social Gatherings with Friends and Family: Three times a week     Attends Jew Services: 1 to 4 times per year     Active Member of Clubs or Organizations: No     Attends Club or Organization Meetings: More than 4 times per year     Marital Status:    Housing Stability: Low Risk  (6/8/2023)    Housing Stability Vital Sign     Unable to Pay for Housing in the Last Year: No     Number of Places Lived in the Last Year: 1     Unstable Housing in the Last Year: No     Family History   Problem Relation Age of Onset    Hypertension Mother     Miscarriages / Stillbirths Mother         Two miscarriages    Early death Father         Massive heart attack age 56    Heart disease Father     Cancer Maternal Aunt         Breast cancer    Hearing loss Sister         Both ears    Learning disabilities Sister         Born with developmental delays      Patient Care Team:  Marely Abreu MD as PCP - General (Internal Medicine)  Diana Virgen MD as Consulting Physician (Otolaryngology)  Philipp Merlos III,  MD as Consulting Physician (Vascular Surgery)  Guy Valdivia Jr., MD as Consulting Physician (Obstetrics and Gynecology)     Subjective:     Review of Systems   Constitutional:  Negative for chills and fever.   Respiratory:  Negative for cough.    Cardiovascular:  Negative for chest pain.   Gastrointestinal:  Negative for abdominal pain, diarrhea, nausea and vomiting.   Genitourinary:  Negative for dysuria and hematuria.     Patient Reported Health Risk Assessment  What [age] 70-79  Are you male or female?: Female  During the past four weeks, how much have you been bothered by emotional problems such as feeling anxious, depressed, irritable, sad, or downhearted and blue?: Not at all  During the past five weeks, has your physical and/or emotional health limited your social activities with family, friends, neighbors, or groups?: Not at all  During the past four weeks, how much bodily pain have you generally had?: Very mild pain  During the past four weeks, was someone available to help if you needed and wanted help?: Yes, as much as I wanted  During the past four weeks, what was the hardest physical activity you could do for at least two minutes?: Moderate  Can you get to places out of walking distance without help?  (For example, can you travel alone on buses or taxis, or drive your own car?): Yes  Can you go shopping for groceries or clothes without someone's help?: Yes  Can you prepare your own meals?: Yes  Can you do your own housework without help?: Yes  Because of any health problems, do you need the help of another person with your personal care needs such as eating, bathing, dressing, or getting around the house?: No  Can you handle your own money without help?: Yes  During the past four weeks, how would you rate your health in general?: Good  How have things been going for you during the past four weeks?: Pretty well  Are you having difficulties driving your car?: No  Do you always fasten your  "seat belt when you are in a car?: Yes, usually  How often in the past four weeks have you been bothered by falling or dizzy when standing up?: Never  How often in the past four weeks have you been bothered by sexual problems?: Never  How often in the past four weeks have you been bothered by trouble eating well?: Seldom  How often in the past four weeks have you been bothered by teeth or denture problems?: Never  How often in the past four weeks have you been bothered with problems using the telephone?: Never  How often in the past four weeks have you been bothered by tiredness or fatigue?: Seldom  Have you fallen two or more times in the past year?: No  Are you afraid of falling?: No  Are you a smoker?: No  During the past four weeks, how many drinks of wine, beer, or other alcoholic beverages did you have?: No alcohol at all  Do you exercise for about 20 minutes three or more days a week?: Yes, most of the time  Have you been given any information to help you with hazards in your house that might hurt you?: Yes  Have you been given any information to help you with keeping track of your medications?: Yes  How often do you have trouble taking medicines the way you've been told to take them?: I always take them as prescribed  How confident are you that you can control and manage most of your health problems?: Very confident  What is your race? (Check all that apply.):     Objective:     /74 (BP Location: Left arm, Patient Position: Sitting, BP Method: Medium (Automatic))   Pulse 74   Temp 98.1 °F (36.7 °C)   Resp 20   Ht 5' 2" (1.575 m)   Wt 68 kg (150 lb)   SpO2 98%   BMI 27.44 kg/m²     Physical Exam  Vitals and nursing note reviewed.   Constitutional:       General: She is not in acute distress.     Appearance: Normal appearance. She is not ill-appearing.   HENT:      Head: Normocephalic.      Nose: No rhinorrhea.      Mouth/Throat:      Mouth: Mucous membranes are moist.   Eyes:      " General: No scleral icterus.     Conjunctiva/sclera: Conjunctivae normal.   Cardiovascular:      Rate and Rhythm: Normal rate and regular rhythm.      Pulses: Normal pulses.      Heart sounds: Normal heart sounds. No murmur heard.  Pulmonary:      Effort: Pulmonary effort is normal. No respiratory distress.      Breath sounds: Normal breath sounds. No wheezing.   Abdominal:      Palpations: Abdomen is soft.      Tenderness: There is no abdominal tenderness.   Musculoskeletal:      Right lower leg: No edema.      Left lower leg: No edema.   Skin:     General: Skin is warm.      Coloration: Skin is not jaundiced.   Neurological:      Mental Status: She is alert. Mental status is at baseline.      Gait: Gait normal.   Psychiatric:         Mood and Affect: Mood normal.         Behavior: Behavior normal.             11/8/2023     9:00 AM 8/8/2023     8:00 AM 6/8/2023     8:20 AM 11/3/2022     9:45 AM 8/2/2022    10:00 AM   Fall Risk Assessment - Outpatient   Mobility Status Ambulatory Ambulatory Ambulatory Ambulatory Ambulatory   Number of falls 0 0 0 0 0   Identified as fall risk False False False False False     Depression Screening  Over the past two weeks, has the patient felt down, depressed, or hopeless?: No  Over the past two weeks, has the patient felt little interest or pleasure in doing things?: No  Functional Ability/Safety Screening  Was the patient's timed Up & Go test unsteady or longer than 30 seconds?: No  Does the patient need help with phone, transportation, shopping, preparing meals, housework, laundry, meds, or managing money?: No  Does the patient's home have rugs in the hallway, lack grab bars in the bathroom, lack handrails on the stairs or have poor lighting?: No  Have you noticed any hearing difficulties?: No  Cognitive Function (Assessed through direct observation with due consideration of information obtained by way of patient reports and/or concerns raised by family, friends, caretakers, or  others)    Does the patient repeat questions/statements in the same day?: No  Does the patient have trouble remembering the date, year, and time?: No  Does the patient have difficulty managing finances?: No  Does the patient have a decreased sense of direction?: No    Assessment/Plan:     1. Wellness examination  Assessment & Plan:  Routine Health Maintenance   Exercise as tolerated, >30 minutes a day for 3-5 days a week.  Counseled patient on compliance with medications and healthy diet.     Age-Appropriate Screening  Vaccinations:    - Flu: Postponed, patient's preference    - Pneumonia: Completed   - Shingles (>50): Postponed, patient's preference    - Tdap: UTD, 2019   - COVID: 2 dose series completed, boosters     Screening:   - Pap Smear (21-65): Aged out    - Mammogram (40-50 discuss w/ pt, all >50): Ordered today, due in December    - Osteoporosis (all>65, sooner if risk factors): UTD   - Lung Ca. Screening (50-80 if >20 pack yrs current or quit <15 yrs):N/A   - Colon Ca. Screening (age >45): UTD, 2019, every 10 years    - Hep. C: Negative         2. Prediabetes  Overview:  Metformin - GI side effects, flu like symptoms    Assessment & Plan:  Most recent A1c was 6.2  Continue Statin   Currently diet/lifestyle controlled, repeat for upcoming visit if remains high will consider medical therapy  Continue ADA diet, Counseled on importance of diet & weight loss         3. Vitamin D deficiency  Assessment & Plan:  Stable, continue supplementation -  decrease in half   Repeat Level at Wellness Visit   Encouraged moderate sun exposure, increase PO calcium intake        4. Hypothyroidism, unspecified type  Assessment & Plan:  Stable, continue Levothyroxine   Last TSH WNL  Encouraged strict medication compliance (take in the AM on an empty stomach with a full glass of water, no food/drink or other medications for >30 minutes)         5. Mixed hyperlipidemia  Assessment & Plan:  Last Lipid Panel showed high  triglycerides   Continue Crestor every other day, max tolerance   Start Vascepa for triglycerides   Counseled on dietary modifications  Counseled to exercise 150 minutes weekly as exercise raises HDL and lowers LDL       Orders:  -     icosapent ethyL (VASCEPA) 1 gram Cap; Take 2 capsules (2 g total) by mouth 2 (two) times a day.  Dispense: 360 capsule; Refill: 3    6. Encounter for screening mammogram for breast cancer  -     Mammo Digital Screening Bilat w/ Marc; Future; Expected date: 12/18/2023    7. RBC abnormality  -     Iron and TIBC; Future; Expected date: 11/08/2023  -     Ferritin; Future; Expected date: 11/08/2023  -     Folate; Future; Expected date: 11/08/2023  -     Vitamin B12; Future; Expected date: 11/08/2023    8. Macrocytosis without anemia  -     Iron and TIBC; Future; Expected date: 11/08/2023  -     Ferritin; Future; Expected date: 11/08/2023  -     Folate; Future; Expected date: 11/08/2023  -     Vitamin B12; Future; Expected date: 11/08/2023    Medicare Annual Wellness and Personalized Prevention Plan:   Fall Risk + Home Safety + Hearing Impairment + Depression Screen + Opioid and Substance Abuse Screening + Cognitive Impairment Screen + Health Risk Assessment all reviewed.     Advance Care Planning   I attest to discussing Advance Care Planning with patient and/or family member.  Education was provided including the importance of the Health Care Power of , Advance Directives, and/or [place] documentation.  The patient expressed understanding to the importance of this information and discussion.     Follow up in about 6 months (around 5/8/2024) for Diabetes, Medical Management. In addition to their scheduled follow up, the patient has also been instructed to follow up on as needed basis.

## (undated) DEVICE — CHLORAPREP 26 ML APPLICATOR - ORANGE TINT(25/CA)

## (undated) DEVICE — SUCTION INSTRUMENT YANKAUER BULBOUS TIP W/O VENT (50EA/CA)

## (undated) DEVICE — CLIP SM INTNL HRZN TI ESCP LGT - (24EA/PK 25PK/BX)

## (undated) DEVICE — GOWN SURGEONS X-LARGE - DISP. (30/CA)

## (undated) DEVICE — SUTURE 3-0 PROLENE SH 30 (36PK/BX)"

## (undated) DEVICE — ELECTRODE 850 FOAM ADHESIVE - HYDROGEL RADIOTRNSPRNT (50/PK)

## (undated) DEVICE — SUTURE GENERAL

## (undated) DEVICE — BLADE SURGICAL #10 - (50/BX)

## (undated) DEVICE — DRAPE LAPAROTOMY T SHEET - (12EA/CA)

## (undated) DEVICE — SUTURE 1 VICRYL PLUS CTX - 8 X 18 INCH (12/BX)

## (undated) DEVICE — SUTURE 3-0 VICRYL PLUS SH - 27 INCH (36/BX)

## (undated) DEVICE — CLIP MED LG INTNL HRZN TI ESCP - (20/BX)

## (undated) DEVICE — SUTURE 2-0 VICRYL PLUS SH - 8 X 18 INCH (12/BX)

## (undated) DEVICE — DRAPE MAYO STAND - (30/CA)

## (undated) DEVICE — GLOVE BIOGEL INDICATOR SZ 8 SURGICAL PF LTX - (50/BX 4BX/CA)

## (undated) DEVICE — NEPTUNE 4 PORT MANIFOLD - (20/PK)

## (undated) DEVICE — TUBING CLEARLINK DUO-VENT - C-FLO (48EA/CA)

## (undated) DEVICE — MASK ANESTHESIA ADULT  - (100/CA)

## (undated) DEVICE — GOWN WARMING STANDARD FLEX - (30/CA)

## (undated) DEVICE — BLANKET WARMING UPPER BODY - (10/CA)

## (undated) DEVICE — SUTURE 2-0 COATED VICRYL PLUS - 12 X 18 INCH (12/BX)

## (undated) DEVICE — SENSOR SPO2 NEO LNCS ADHESIVE (20/BX) SEE USER NOTES

## (undated) DEVICE — CLIP MED INTNL HRZN TI ESCP - (25/BX)

## (undated) DEVICE — SUTURE 1 PDS II PLUS TP-1 - (12PK/BX)

## (undated) DEVICE — CLIP LG INTNL HRZN TI ESCP LGT - (20/BX)

## (undated) DEVICE — SUTURE 2-0 VICRYL PLUS CT-1 - 8 X 18 INCH(12/BX)

## (undated) DEVICE — KIT ANESTHESIA W/CIRCUIT & 3/LT BAG W/FILTER (20EA/CA)

## (undated) DEVICE — STAPLER SKIN DISP - (6/BX 10BX/CA) VISISTAT

## (undated) DEVICE — PROTECTOR ULNA NERVE - (36PR/CA)

## (undated) DEVICE — GLOVE BIOGEL ECLIPSE PF LATEX SIZE 8.0  (50PR/BX)

## (undated) DEVICE — LACTATED RINGERS INJ 1000 ML - (14EA/CA 60CA/PF)

## (undated) DEVICE — ELECTRODE DUAL RETURN W/ CORD - (50/PK)

## (undated) DEVICE — SUTURE 3-0 VICRYL PLUS SH - 8X 18 INCH (12/BX)

## (undated) DEVICE — SLEEVE, VASO, THIGH, MED

## (undated) DEVICE — SODIUM CHL IRRIGATION 0.9% 1000ML (12EA/CA)

## (undated) DEVICE — SET EXTENSION WITH 2 PORTS (48EA/CA) ***PART #2C8610 IS A SUBSTITUTE*****

## (undated) DEVICE — SPONGE GAUZESTER 4 X 4 4PLY - (128PK/CA)

## (undated) DEVICE — SET LEADWIRE 5 LEAD BEDSIDE DISPOSABLE ECG (1SET OF 5/EA)

## (undated) DEVICE — KIT ROOM DECONTAMINATION

## (undated) DEVICE — HEAD HOLDER JUNIOR/ADULT

## (undated) DEVICE — PACK MAJOR BASIN - (2EA/CA)

## (undated) DEVICE — CANISTER SUCTION 3000ML MECHANICAL FILTER AUTO SHUTOFF MEDI-VAC NONSTERILE LF DISP  (40EA/CA)

## (undated) DEVICE — TUBE CONNECT SUCTION CLEAR 120 X 1/4" (50EA/CA)"

## (undated) DEVICE — SUTURE 0 COATED VICRYL 6-18IN - (12PK/BX)